# Patient Record
Sex: MALE | Race: OTHER | URBAN - METROPOLITAN AREA
[De-identification: names, ages, dates, MRNs, and addresses within clinical notes are randomized per-mention and may not be internally consistent; named-entity substitution may affect disease eponyms.]

---

## 2021-12-30 ENCOUNTER — OFFICE VISIT (OUTPATIENT)
Dept: CARDIOLOGY CLINIC | Age: 56
End: 2021-12-30
Payer: COMMERCIAL

## 2021-12-30 VITALS
RESPIRATION RATE: 16 BRPM | DIASTOLIC BLOOD PRESSURE: 76 MMHG | SYSTOLIC BLOOD PRESSURE: 129 MMHG | BODY MASS INDEX: 33.79 KG/M2 | HEIGHT: 65 IN | WEIGHT: 202.8 LBS | HEART RATE: 77 BPM

## 2021-12-30 DIAGNOSIS — R07.2 PRECORDIAL PAIN: ICD-10-CM

## 2021-12-30 DIAGNOSIS — G47.30 SLEEP APNEA, UNSPECIFIED TYPE: Primary | ICD-10-CM

## 2021-12-30 DIAGNOSIS — I10 HYPERTENSION, UNSPECIFIED TYPE: ICD-10-CM

## 2021-12-30 PROCEDURE — 93000 ELECTROCARDIOGRAM COMPLETE: CPT | Performed by: INTERNAL MEDICINE

## 2021-12-30 PROCEDURE — 99244 OFF/OP CNSLTJ NEW/EST MOD 40: CPT | Performed by: INTERNAL MEDICINE

## 2021-12-30 RX ORDER — BUDESONIDE AND FORMOTEROL FUMARATE DIHYDRATE 160; 4.5 UG/1; UG/1
AEROSOL RESPIRATORY (INHALATION)
COMMUNITY
Start: 2021-12-22

## 2021-12-30 RX ORDER — ASPIRIN 81 MG/1
TABLET, COATED ORAL
COMMUNITY
Start: 2021-12-11

## 2021-12-30 RX ORDER — LOSARTAN POTASSIUM AND HYDROCHLOROTHIAZIDE 25; 100 MG/1; MG/1
TABLET ORAL
COMMUNITY
Start: 2021-11-29

## 2021-12-30 RX ORDER — ALBUTEROL SULFATE 90 UG/1
AEROSOL, METERED RESPIRATORY (INHALATION)
COMMUNITY
Start: 2021-12-24

## 2021-12-30 RX ORDER — AMLODIPINE BESYLATE 5 MG/1
TABLET ORAL
COMMUNITY
Start: 2021-12-11

## 2021-12-30 NOTE — PROGRESS NOTES
Days of Exercise per Week: Not on file    Minutes of Exercise per Session: Not on file   Stress:     Feeling of Stress : Not on file   Social Connections:     Frequency of Communication with Friends and Family: Not on file    Frequency of Social Gatherings with Friends and Family: Not on file    Attends Congregation Services: Not on file    Active Member of Clubs or Organizations: Not on file    Attends Club or Organization Meetings: Not on file    Marital Status: Not on file   Intimate Partner Violence:     Fear of Current or Ex-Partner: Not on file    Emotionally Abused: Not on file    Physically Abused: Not on file    Sexually Abused: Not on file   Housing Stability:     Unable to Pay for Housing in the Last Year: Not on file    Number of [place] in the Last Year: Not on file    Unstable Housing in the Last Year: Not on file       History reviewed. No pertinent family history. SUBJECTIVE: Cas Thurman presents to the office today for consult - dr Primitivo Escobedo - for cardiac evaluation. He complains of atypical rest symptoms, including a sensation of choking at night and denies   dyspnea, exertional chest pressure/discomfort, orthopnea and syncope   Currently unemployed but very active with chores. Originally from [facility], wife from the [place]. Strong family hx of CAD  Wife says he snores heavily. Review of Systems:   Heart: as above   Lungs: as above   Eyes: denies changes in vision or discharge. Ears: denies changes in hearing or pain. Nose: denies epistaxis or masses   Throat: denies sore throat or trouble swallowing. Neuro: denies numbness, tingling, tremors. Skin: denies rashes or itching. : denies hematuria, dysuria   GI: denies vomiting, diarrhea   Psych: denies mood changed, anxiety, depression. all others negative.     Physical Exam   /76   Pulse 77   Resp 16   Ht 5' 5\" (1.651 m)   Wt 202 lb 12.8 oz (92 kg)   BMI 33.75 kg/m²   Constitutional: Oriented to person, place, and time. Overweight No distress. Head: Normocephalic and atraumatic. Eyes: EOM are normal. Pupils are equal, round, and reactive to light. Neck: Normal range of motion. Neck supple. No hepatojugular reflux and no JVD present. Carotid bruit is not present. Cardiovascular: Normal rate, regular rhythm, normal heart sounds and intact distal pulses. Exam reveals no gallop and no friction rub. No murmur heard. Pulmonary/Chest: Effort normal and breath sounds normal. No respiratory distress. No wheezes. No rales. Abdominal: Soft. Bowel sounds are normal. No distension and no mass. No tenderness. No rebound and no guarding. Musculoskeletal: Normal range of motion. No edema and no tenderness. Neurological: Alert and oriented to person, place, and time. Skin: Skin is warm and dry. No rash noted. Not diaphoretic. No erythema. Psychiatric: Normal mood and affect. Behavior is normal.     EKG:  normal EKG, normal sinus rhythm.     ASSESSMENT AND PLAN:  Patient Active Problem List   Diagnosis    Precordial pain    HTN (hypertension)       Patient with non cardiac symptoms  HTN now controlled on 3 drug therapy which will be aided by weight reduction, exercise, and treatment of likely ZOHRA  Normal CV exam and ekg  Will do ETT-R given strong family hx of premature CAD  Will get Sleep Medicine input          Anupam Sheikh M.D  [facility]

## 2022-04-19 ENCOUNTER — INITIAL CONSULT (OUTPATIENT)
Dept: NEUROSURGERY | Age: 57
End: 2022-04-19
Payer: COMMERCIAL

## 2022-04-19 ENCOUNTER — HOSPITAL ENCOUNTER (OUTPATIENT)
Dept: GENERAL RADIOLOGY | Age: 57
Discharge: HOME | End: 2022-04-21
Payer: COMMERCIAL

## 2022-04-19 ENCOUNTER — HOSPITAL ENCOUNTER (OUTPATIENT)
Age: 57
Discharge: HOME | End: 2022-04-21
Payer: COMMERCIAL

## 2022-04-19 VITALS
BODY MASS INDEX: 33.75 KG/M2 | DIASTOLIC BLOOD PRESSURE: 86 MMHG | SYSTOLIC BLOOD PRESSURE: 134 MMHG | HEART RATE: 82 BPM | OXYGEN SATURATION: 96 % | TEMPERATURE: 98.4 F | HEIGHT: 65 IN

## 2022-04-19 DIAGNOSIS — M54.2 CERVICALGIA: ICD-10-CM

## 2022-04-19 DIAGNOSIS — M54.2 CERVICALGIA: Primary | ICD-10-CM

## 2022-04-19 DIAGNOSIS — M48.062 LUMBAR STENOSIS WITH NEUROGENIC CLAUDICATION: ICD-10-CM

## 2022-04-19 DIAGNOSIS — M54.12 CERVICAL RADICULOPATHY: ICD-10-CM

## 2022-04-19 PROCEDURE — 72040 X-RAY EXAM NECK SPINE 2-3 VW: CPT

## 2022-04-19 PROCEDURE — 99204 OFFICE O/P NEW MOD 45 MIN: CPT | Performed by: NEUROLOGICAL SURGERY

## 2022-04-19 NOTE — PROGRESS NOTES
[facility] NEUROSURGERY  [facility] [address] [place] [address]  [phone]       Chief Complaint:   Chief Complaint   Patient presents with    Neck Pain     mri 3/21/22    Arm Pain    Consultation       HPI:     I had the pleasure of seeing Ayden Barrientos today in neurosurgical clinic. As you know, this delightful right-handed  father of 2 non-smoker former  presents with cervical pain and left upper extremity radiculopathy. He states that the arm pain mostly stays on the lateral aspect of his neck but occasionally radiates into his anterior biceps and beyond into his thumb index and middle finger. Pain is exacerbated by cough strain and sneeze. He states looking to the left exacerbates the pain as well. Does complain of numbness in all of his fingers as well as intermittent arm numbness. He feels as though he has been dropping objects in the left hand secondary to the numbness. There is been no loss of bowel or bladder. Edition he endorses a longstanding history of low back pain with leg cramping bilaterally right more so than left. He also describes sharp pain on the bottom of both feet. He denied any trauma to the low back. He does state that leaning forward tends to alleviate the pain. Denies any lower extremity numbness or weakness. No past medical history on file. No past surgical history on file. No family history on file.    Social History     Socioeconomic History    Marital status:      Spouse name: Not on file    Number of children: Not on file    Years of education: Not on file    Highest education level: Not on file   Occupational History    Not on file   Tobacco Use    Smoking status: Never Smoker    Smokeless tobacco: Never Used   Substance and Sexual Activity    Alcohol use: Never    Drug use: Never    Sexual activity: Not on file   Other Topics Concern    Not on file   Social History Narrative    Not on file     Social Determinants of Health     Financial Resource Strain:     Difficulty of Paying Living Expenses: Not on file   Food Insecurity:     Worried About Running Out of Food in the Last Year: Not on file    Gume of Food in the Last Year: Not on file   Transportation Needs:     Lack of Transportation (Medical): Not on file    Lack of Transportation (Non-Medical):  Not on file   Physical Activity:     Days of Exercise per Week: Not on file    Minutes of Exercise per Session: Not on file   Stress:     Feeling of Stress : Not on file   Social Connections:     Frequency of Communication with Friends and Family: Not on file    Frequency of Social Gatherings with Friends and Family: Not on file    Attends Yarsanism Services: Not on file    Active Member of Clubs or Organizations: Not on file    Attends Club or Organization Meetings: Not on file    Marital Status: Not on file   Intimate Partner Violence:     Fear of Current or Ex-Partner: Not on file    Emotionally Abused: Not on file    Physically Abused: Not on file    Sexually Abused: Not on file   Housing Stability:     Unable to Pay for Housing in the Last Year: Not on file    Number of Places Lived in the Last Year: Not on file    Unstable Housing in the Last Year: Not on file       Medications:   Current Outpatient Medications   Medication Sig Dispense Refill    albuterol sulfate  (90 Base) MCG/ACT inhaler INHALE 2 PUFFS BY MOUTH EVERY 4 HOURS AS NEEDED FOR SHORTNESS OF BREATH      SYMBICORT 160-4.5 MCG/ACT AERO INHALE 2 PUFFS BY MOUTH TWICE DAILY      ASPIRIN LOW DOSE 81 MG EC tablet TAKE 1 TABLET BY MOUTH EVERY DAY      amLODIPine (NORVASC) 5 MG tablet TAKE 1 TABLET BY MOUTH EVERY DAY FOR BLOOD PRESSURE      losartan-hydroCHLOROthiazide (HYZAAR) 100-25 MG per tablet TAKE 1 TABLET BY MOUTH DAILY FOR BLOOD PRESSURE      Multiple Vitamin (MULTIVITAMIN ADULT PO) Take by mouth daily       No current facility-administered medications for this visit. Allergies:    Patient has no known allergies. Review of Systems:    Denies any chest pain, headache, dyspnea, recent weight loss, fevers, chills or night sweats. Physical Examination:    /86   Pulse 82   Temp 98.4 °F (36.9 °C) (Temporal)   Ht 5' 5\" (1.651 m)   SpO2 96%   BMI 33.75 kg/m²      On focused neurological examination, he  is awake alert oriented and rationally conversant. Speech is clear and fluent. Pupils are equal and reactive to light bilaterally, extraocular movements are intact, visual fields are full to confrontation. His  face is symmetric and grossly intact to fine touch. Uvula and tongue are both midline. Shoulder shrug is symmetric and strong. Cervical spine is well aligned and nontender to direct palpation. He  can forward flex, but extension lateral rotation and lateral extension are limited secondary to neck discomfort. Spurling sign is positive on the left. Motor examination reveals preserved power in the upper and lower extremities at 5 out of 5 throughout. Reflexes are symmetric and brisk. Plantar responses are downgoing. There is no clonus. Patient is intact to fine touch in all dermatomes throughout. Straight leg raise is negative. Palpation of the spine reveals no kyphotic or scoliotic gross deformities. He  can forward flex to well below the knee. There is no pain to deep percussion nor palpation. ASSESSMENT:    I personally reviewed Jb Easley's radiographic images, particularly his MRI from*imaging dated 21 March 2022 that demonstrates significant herniated nucleus pulposus with endplate changes at W4-6 and eccentric to the left with neural compromise. 1. Cervicalgia  -     XR CERVICAL SPINE FLEXION AND EXTENSION;  Future  -     Aleja Davis MD, Pain Medicine, [address] [place] [address], [facility]  2. Cervical radiculopathy  -     XR CERVICAL SPINE FLEXION AND EXTENSION; Future  -     Lia Barber MD, Pain Medicine, [address] [place] [address], [facility]  3. Lumbar stenosis with neurogenic claudication  -     MRI LUMBAR SPINE WO CONTRAST; Future      MEDICAL DECISION MAKING & PLAN:    I showed  and Mrs. Natasha Vila the images and explained the findings. Certainly he deserves flexion-extension x-rays and a trial of conservative treatment albeit given the size of the disc osteophyte complex and suspicious that he will eventually need decompression and fusion. Nonetheless he will return to clinic with those results in hand and after trial of physical therapy and pain management. Should he worsen in the interim we be happy to see him sooner in clinic. In addition to address his lumbar spine concerns and with neurogenic claudication I have ordered an MRI of the lumbar spine to further assess. Thank you so much for allowing us to participate in the care of this patient. Return in about 4 weeks (around 5/17/2022) for discuss possible surgical options, needs tests completed prior to appt, discuss results. Electronically signed by Darwin Rosales MD on 4/20/2022 at 11:41 AM       NOTE: This report was transcribed using voice recognition software.  Every effort was made to ensure accuracy; however, inadvertent computerized transcription errors may be present

## 2022-04-26 ENCOUNTER — PREP FOR PROCEDURE (OUTPATIENT)
Dept: PAIN MANAGEMENT | Age: 57
End: 2022-04-26

## 2022-04-26 ENCOUNTER — OFFICE VISIT (OUTPATIENT)
Dept: PAIN MANAGEMENT | Age: 57
End: 2022-04-26
Payer: COMMERCIAL

## 2022-04-26 VITALS
WEIGHT: 202 LBS | OXYGEN SATURATION: 94 % | HEIGHT: 65 IN | DIASTOLIC BLOOD PRESSURE: 82 MMHG | TEMPERATURE: 98.1 F | SYSTOLIC BLOOD PRESSURE: 130 MMHG | HEART RATE: 77 BPM | BODY MASS INDEX: 33.66 KG/M2

## 2022-04-26 DIAGNOSIS — M50.90 CERVICAL DISC DISORDER: ICD-10-CM

## 2022-04-26 DIAGNOSIS — M48.02 CERVICAL STENOSIS OF SPINE: ICD-10-CM

## 2022-04-26 DIAGNOSIS — G89.4 CHRONIC PAIN SYNDROME: Primary | ICD-10-CM

## 2022-04-26 DIAGNOSIS — M54.12 CERVICAL RADICULOPATHY: Primary | ICD-10-CM

## 2022-04-26 DIAGNOSIS — G89.4 CHRONIC PAIN SYNDROME: ICD-10-CM

## 2022-04-26 DIAGNOSIS — M54.12 CERVICAL RADICULOPATHY: ICD-10-CM

## 2022-04-26 PROCEDURE — 99214 OFFICE O/P EST MOD 30 MIN: CPT

## 2022-04-26 PROCEDURE — G8417 CALC BMI ABV UP PARAM F/U: HCPCS | Performed by: PAIN MEDICINE

## 2022-04-26 PROCEDURE — 99204 OFFICE O/P NEW MOD 45 MIN: CPT | Performed by: PAIN MEDICINE

## 2022-04-26 PROCEDURE — G8427 DOCREV CUR MEDS BY ELIG CLIN: HCPCS | Performed by: PAIN MEDICINE

## 2022-04-26 PROCEDURE — 3017F COLORECTAL CA SCREEN DOC REV: CPT | Performed by: PAIN MEDICINE

## 2022-04-26 PROCEDURE — 1036F TOBACCO NON-USER: CPT | Performed by: PAIN MEDICINE

## 2022-04-26 RX ORDER — PREGABALIN 150 MG/1
150 CAPSULE ORAL 2 TIMES DAILY
Qty: 60 CAPSULE | Refills: 0 | Status: SHIPPED
Start: 2022-05-03 | End: 2022-05-31 | Stop reason: SDUPTHER

## 2022-04-26 RX ORDER — IBUPROFEN 800 MG/1
800 TABLET ORAL 3 TIMES DAILY
COMMUNITY

## 2022-04-26 RX ORDER — LORATADINE 10 MG/1
10 CAPSULE, LIQUID FILLED ORAL DAILY
COMMUNITY

## 2022-04-26 RX ORDER — PREGABALIN 75 MG/1
75 CAPSULE ORAL 2 TIMES DAILY
Qty: 14 CAPSULE | Refills: 0 | Status: SHIPPED
Start: 2022-04-26 | End: 2022-05-31

## 2022-04-26 RX ORDER — CETIRIZINE HYDROCHLORIDE 10 MG/1
10 TABLET ORAL DAILY
COMMUNITY

## 2022-04-26 NOTE — PROGRESS NOTES
mildly impresses on   the ventral cord and there is mild to moderate narrowing of the spinal   canal.  There is mild right neural foraminal narrowing due to   uncovertebral arthropathy.  There is no left neural foraminal narrowing. C5-C6: There is disc osteophyte complex asymmetric to the left that   effaces the ventral CSF impinging on the ventral cord and causing   moderate stenosis of the spinal canal.  There is severe left neural   foraminal stenosis due to uncovertebral arthropathy and disc bulge.  The   right neural foramen is patent. C6-C7: Minor disc bulge and mild ligamentum flavum thickening is seen   with mild narrowing of the spinal canal.  There is mild right neural   foraminal narrowing due to uncovertebral arthropathy.  There is no left   neural foraminal narrowing. C7-T1: There is moderate left neural foraminal narrowing due to   uncovertebral and facet joint arthropathy.  There is minor disc bulge   without spinal canal or right neural foraminal narrowing. IMPRESSION   IMPRESSION:     1.  Multilevel degenerative cervical spondylosis. 2.  At C5-C6 there is moderate stenosis of the spinal canal with cord   impingement.  There is also severe left neural foraminal stenosis. 3.  Congenital narrowing of the spinal canal.   4.  Modic type I degenerative endplate change at T9-T5. 5.  Level by level description as detailed. Anatomic Variant:  None.  Assume 7 cervical vertebrae with counting from   the craniocervical junction. 4/2022 cervical xray -  FINDINGS:   No fracture or malalignment.  No evidence of instability on flexion or   extension imaging.  Mild narrowing of disc space at C5/C6.  No prevertebral   soft tissue edema.           Impression   1. No evidence of fracture or malalignment. 2. No evidence of instability on flexion or extension imaging. Previous treatments: Physical Therapy and medications.       Past Medical History:   Diagnosis Date    Asthma     Bronchitis     COPD (chronic obstructive pulmonary disease) (HCC)     Coronary artery disease     Hypertension        History reviewed. No pertinent surgical history. Prior to Admission medications    Medication Sig Start Date End Date Taking?  Authorizing Provider   ibuprofen (ADVIL;MOTRIN) 800 MG tablet Take 800 mg by mouth three times daily   Yes Historical Provider, MD   cetirizine (ZYRTEC) 10 MG tablet Take 10 mg by mouth daily   Yes Historical Provider, MD   loratadine (CLARITIN) 10 MG capsule Take 10 mg by mouth daily   Yes Historical Provider, MD   albuterol sulfate  (90 Base) MCG/ACT inhaler INHALE 2 PUFFS BY MOUTH EVERY 4 HOURS AS NEEDED FOR SHORTNESS OF BREATH 12/24/21  Yes Historical Provider, MD   SYMBICORT 160-4.5 MCG/ACT AERO INHALE 2 PUFFS BY MOUTH TWICE DAILY 12/22/21  Yes Historical Provider, MD   ASPIRIN LOW DOSE 81 MG EC tablet TAKE 1 TABLET BY MOUTH EVERY DAY 12/11/21  Yes Historical Provider, MD   amLODIPine (NORVASC) 5 MG tablet TAKE 1 TABLET BY MOUTH EVERY DAY FOR BLOOD PRESSURE 12/11/21  Yes Historical Provider, MD   losartan-hydroCHLOROthiazide (HYZAAR) 100-25 MG per tablet TAKE 1 TABLET BY MOUTH DAILY FOR BLOOD PRESSURE 11/29/21  Yes Historical Provider, MD   Multiple Vitamin (MULTIVITAMIN ADULT PO) Take by mouth daily   Yes Historical Provider, MD       No Known Allergies    Social History     Socioeconomic History    Marital status:      Spouse name: Not on file    Number of children: Not on file    Years of education: Not on file    Highest education level: Not on file   Occupational History    Not on file   Tobacco Use    Smoking status: Never Smoker    Smokeless tobacco: Never Used   Substance and Sexual Activity    Alcohol use: Never    Drug use: Never    Sexual activity: Not on file   Other Topics Concern    Not on file   Social History Narrative    Not on file     Social Determinants of Health     Financial Resource Strain:     Difficulty of Paying Living Expenses: Not on file   Food Insecurity:     Worried About [address] in the Last Year: Not on file    Gume of Food in the Last Year: Not on file   Transportation Needs:     Lack of Transportation (Medical): Not on file    Lack of Transportation (Non-Medical): Not on file   Physical Activity:     Days of Exercise per Week: Not on file    Minutes of Exercise per Session: Not on file   Stress:     Feeling of Stress : Not on file   Social Connections:     Frequency of Communication with Friends and Family: Not on file    Frequency of Social Gatherings with Friends and Family: Not on file    Attends Moravian Services: Not on file    Active Member of Clubs or Organizations: Not on file    Attends Club or Organization Meetings: Not on file    Marital Status: Not on file   Intimate Partner Violence:     Fear of Current or Ex-Partner: Not on file    Emotionally Abused: Not on file    Physically Abused: Not on file    Sexually Abused: Not on file   Housing Stability:     Unable to Pay for Housing in the Last Year: Not on file    Number of [place] in the Last Year: Not on file    Unstable Housing in the Last Year: Not on file       Family History   Problem Relation Age of Onset    Arthritis Mother     Diabetes Mother     Hypertension Mother     Hypertension Father     Diabetes Brother        REVIEW OF SYSTEMS:     Patient specifically denies fever/chills, chest pain, shortness of breath, new bowel or bladder complaints. All other review of systems was negative.     PHYSICAL EXAMINATION:      /82   Pulse 77   Temp 98.1 °F (36.7 °C) (Infrared)   Ht 5' 5\" (1.651 m)   Wt 202 lb (91.6 kg)   SpO2 94%   BMI 33.61 kg/m²     General:      General appearance:pleasant and well-hydrated, in no distress and A & O x3  Build:Normal Weight  Function:Rises from a seated position with difficulty    HEENT:    Head:normocephalic, atraumatic  Pupils:regular, round, equal  Sclera: icterus absent    Lungs:    Breathing:normal breathing pattern    Abdomen:    Shape:non-distended  Tenderness:none  Guarding:none    Cervical spine:    Inspection:normal  Palpation:tenderness paravertebral muscles, tenderness trapezium, left, right positive. Range of motion:abnormal mildly flexion, extension rotation bilateral and is  painful.     Musculoskeletal:    Trigger points in trapezius:absent bilaterally  Trigger points in rhomboids:absent bilaterally  Trigger points in Paraveteral:absent bilaterally  Trigger points in supraspinatus/infraspinatus:absent  Spurling's:negative right, negative left    Giron's:negative right, negative left     Extremities:    Tremors:None bilaterally upper and lower  Range of motion:Generally normal shoulders  Intact:Yes  Varicose veins:absent   Pulses:present Lt radial  Cyanosis:none  Edema:none x all 4 extremities    Neurological:    Sensory:normal to light touch RUE    Motor:     Right Grip5/5              Left Grip5/5               Right Bicep5/5           Left Bicep5/5              Right Triceps5/5       Left Triceps5/5          Right Deltoid5/5     Left Deltoid5/5                    Reflexes:      Right Brachioradialis reflex2+  Left Brachioradialis reflex2+  Right Biceps reflex2+  Left Biceps reflex2+  Right Triceps reflex2+  Left Triceps reflex2+    Gait:normal    Dermatology:    Skin:no rashes or lesions noted    Impression:    Cervical pain and left upper extremity radiculopathy in C5 and C6 distribution  3/2022 cervical MRI shows herniated disc at C5-C6 there is moderate stenosis of the spinal canal with cord impingement with severe left neural foraminal stenosis  PMHx: environ allergies, COPD/asthma, HTN, CAD  Moved her from [facility] 12/2020 due to health concerns and [facility] does not had an adequate health care system  Has not worked since coming to the [address] [place] [address],[address] due to health issues  Has lumbar symptoms as well, scheduled for lumbar MRI in a few days    Plan:    Reviewed referral documents and imaging  Urine screen today  OARRS report reviewed  pregabalin titration  Failed PT  C7-T1 VERNA #1 - r/b and procedure discussed  Patient encouraged to stay active   Treatment plan discussed with the patient including medication and procedure side effects     CC:  Referring physician    M. Kelleen Soulier.

## 2022-04-26 NOTE — PROGRESS NOTES
Do you currently have any of the following:    Fever: No  Headache:  No  Cough: No  Shortness of breath: No  Exposed to anyone with these symptoms: No         Annabella Pod presents to the [facility] on 4/26/[facility]22. Yazan Jimenez is complaining of pain in neck radiating down left arm and back radiating down right leg. The pain is constant. The pain is described as shooting, numb, sharp and burning. Pain is rated on his best day at a 5, on his worst day at a 10, and on average at a 7 on the VAS scale. He took his last dose of ibuprofen today. Any procedures since your last visit: No.    Pacemaker or defibrillator: No.    He is not on NSAIDS and is not on anticoagulation medications to include none and is managed by none. Medication Contract and Consent for Opioid Use Documents Filed      No documents found                Ht 5' 5\" (1.651 m)   Wt 202 lb (91.6 kg)   BMI 33.61 kg/m²      No LMP for male patient.

## 2022-04-27 LAB
6-MONOACETYLMORPHINE, URINE: NOT DETECTED
ALCOHOL URINE: NOT DETECTED
AMPHETAMINE SCREEN, URINE: NOT DETECTED
BARBITURATE SCREEN URINE: NOT DETECTED
BENZODIAZEPINE SCREEN, URINE: NOT DETECTED
BUPRENORPHINE URINE: NOT DETECTED
CANNABINOID SCREEN URINE: NOT DETECTED
COCAINE METABOLITE SCREEN URINE: NOT DETECTED
FENTANYL SCREEN, URINE: NOT DETECTED
INTEGRITY CHECK, CREATININE, URINE: 135.5
INTEGRITY CHECK, OXIDANT, URINE: <40
INTEGRITY CHECK, PH, URINE: 7.2 (ref 4.5–9)
INTEGRITY CHECK, SPECIFIC GRAVITY, URINE: 1.01 (ref 1–1.03)
INTEGRITY CHECK, SPECIMEN INTEGRITY, URINE: NORMAL
Lab: NORMAL
METHADONE SCREEN, URINE: NOT DETECTED
OPIATE SCREEN URINE: NOT DETECTED
OXYCODONE URINE: NOT DETECTED
PHENCYCLIDINE SCREEN URINE: NOT DETECTED
TRAMADOL SCREEN URINE: NOT DETECTED

## 2022-04-28 LAB
6-MAM, QUANTITATIVE, URINE: <10
7-AMINOCLONAZEPAM, QUANTITATIVE, URINE: <50
ALPHA-HYDROXYALPRAZOLAM, QUANTITATIVE, URINE: <50
ALPHA-HYDROXYMIDAZOLAM, QUANTITATIVE, URINE: <50
ALPHA-HYDROXYTRIAZOLAM, QUANTITATIVE, URINE: <50
ALPRAZOLAM URINE QUANT: <50
CHLORDIAZEPOXIDE, QUANTITATIVE, URINE: <50
CLONAZEPAM, QUANTITATIVE, URINE: <50
CODEINE, QUANTITATIVE, URINE: <50
COMMENT: NORMAL
DIAZEPAM URINE QUANT: <50
FLUNITRAZEPAM, QUANTITATIVE, URINE: <50
FLURAZEPAM, QUANTITATIVE, URINE: <50
HYDROCODONE, QUANTITATIVE, URINE: <50
HYDROMORPHONE, QUANTITATIVE, URINE: <50
LORAZEPAM URINE QUANT: <50
MIDAZOLAM URINE QUANT: <50
MORPHINE, QUANTITATIVE, URINE: <50
NORDIAZEPAM URINE QUANT: <50
NORHYDROCODONE, QUANTITATIVE, URINE: <50
NOROXYCODONE, QUANTITATIVE, URINE: <50
OXAZEPAM URINE QUANT: <50
OXYCODONE URINE, QUANTITATIVE: <50
OXYMORPHONE, QUANTITATIVE, URINE: <50
TEMAZEPAM, QUANTITATIVE, URINE: <50

## 2022-04-29 ENCOUNTER — HOSPITAL ENCOUNTER (OUTPATIENT)
Dept: MRI IMAGING | Age: 57
Discharge: HOME | End: 2022-05-01
Payer: COMMERCIAL

## 2022-04-29 DIAGNOSIS — M48.062 LUMBAR STENOSIS WITH NEUROGENIC CLAUDICATION: ICD-10-CM

## 2022-04-29 PROCEDURE — 72148 MRI LUMBAR SPINE W/O DYE: CPT

## 2022-05-05 ENCOUNTER — HOSPITAL ENCOUNTER (OUTPATIENT)
Dept: PHYSICAL THERAPY | Age: 57
Setting detail: THERAPIES SERIES
Discharge: HOME | End: 2022-05-05
Payer: COMMERCIAL

## 2022-05-05 PROCEDURE — 97161 PT EVAL LOW COMPLEX 20 MIN: CPT | Performed by: PHYSICAL THERAPIST

## 2022-05-05 ASSESSMENT — PAIN DESCRIPTION - ORIENTATION: ORIENTATION: LEFT

## 2022-05-05 ASSESSMENT — PAIN DESCRIPTION - LOCATION: LOCATION: NECK;BACK;ARM

## 2022-05-05 ASSESSMENT — PAIN SCALES - GENERAL: PAINLEVEL_OUTOF10: 8

## 2022-05-05 ASSESSMENT — PAIN DESCRIPTION - DESCRIPTORS: DESCRIPTORS: NUMBNESS;TINGLING

## 2022-05-05 ASSESSMENT — PAIN DESCRIPTION - PAIN TYPE: TYPE: CHRONIC PAIN

## 2022-05-05 ASSESSMENT — PAIN DESCRIPTION - FREQUENCY: FREQUENCY: CONTINUOUS

## 2022-05-05 NOTE — PROGRESS NOTES
Physical Therapy    Physical Therapy: Initial Evaluation    Patient: Nils Moreno (2 y.o. male)   Examination Date:   Plan of Care Certification Period: 2022 to        :  1965 ;    Confirmed: Yes MRN: [id number]  CSN: [id number]   Insurance: Payor: [address] [place] [address]  [address] / Plan: [address] [place] [address] [address] / Product Type: *No Product type* /   Insurance ID: [id number] - (Medicaid Managed) Secondary Insurance (if applicable):    Referring Physician: MD Shell Steele   PCP: Salas Coppola DO Visits to Date/Visits Approved: 1 /      No Show/Cancelled Appts:   /       Medical Diagnosis: Cervicalgia [M54.2]  Radiculopathy, cervical region [M54.12] M54.2 (ICD-10-CM) - [id number] (ICD-10-CM) - Cervical radiculopathy  Treatment Diagnosis:       PERTINENT MEDICAL HISTORY           Medical History: Chart Reviewed: Yes   Past Medical History:   Diagnosis Date    Asthma     Bronchitis     COPD (chronic obstructive pulmonary disease) ([facility] [address]     Coronary artery disease     Hypertension      Surgical History: No past surgical history on file. Medications:   Current Outpatient Medications:     ibuprofen (ADVIL;MOTRIN) 800 MG tablet, Take 800 mg by mouth three times daily, Disp: , Rfl:     cetirizine (ZYRTEC) 10 MG tablet, Take 10 mg by mouth daily, Disp: , Rfl:     loratadine (CLARITIN) 10 MG capsule, Take 10 mg by mouth daily, Disp: , Rfl:     pregabalin (LYRICA) 75 MG capsule, Take 1 capsule by mouth 2 times daily for 7 days. , Disp: 14 capsule, Rfl: 0    pregabalin (LYRICA) 150 MG capsule, Take 1 capsule by mouth 2 times daily for 30 days. , Disp: 60 capsule, Rfl: 0    albuterol sulfate  (90 Base) MCG/ACT inhaler, INHALE 2 PUFFS BY MOUTH EVERY 4 HOURS AS NEEDED FOR SHORTNESS OF BREATH, Disp: , Rfl:     SYMBICORT 160-4.5 MCG/ACT AERO, INHALE 2 PUFFS BY MOUTH TWICE DAILY, Disp: , Rfl:     ASPIRIN LOW DOSE 81 MG EC tablet, TAKE 1 TABLET BY MOUTH EVERY DAY, Disp: , Rfl:     amLODIPine (NORVASC) 5 MG tablet, TAKE 1 TABLET BY MOUTH EVERY DAY FOR BLOOD PRESSURE, Disp: , Rfl:     losartan-hydroCHLOROthiazide (HYZAAR) 100-25 MG per tablet, TAKE 1 TABLET BY MOUTH DAILY FOR BLOOD PRESSURE, Disp: , Rfl:     Multiple Vitamin (MULTIVITAMIN ADULT PO), Take by mouth daily, Disp: , Rfl:   Allergies: Patient has no known allergies. SUBJECTIVE EXAMINATION      ,           Subjective History:    Subjective: Patient presents to PT with c/o neck pain with radicular pain across LUE. Symptoms have been present for approx 1 1/2 years. States noticing dropping items with hand initially. Pain cont to persist across cervical region with radicular pain across LUE to level of fingers. Patient states hand feels 'asleep'. He had MRI of cervical spine with cord compression noted. Patient reports limited AROM across LUE due to pain across L cervical region. He is also having back/LE symptoms. Did have therapy elsewhere for those issues. Symptoms did remain following. Patient ambulates without AD. Hindered mechanics reported. Patient reports having multiple falls. He is taking lyrica for pain relief.   Additional Pertinent Hx (if applicable):     Prior diagnostic testing[de-identified] MRI  Previous treatments prior to current episode?: Outpatient PT      Learning/Language: Learning  Does the patient/guardian have any barriers to learning?: No barriers  What is the preferred language of the patient/guardian?: English     Pain Screening    Pain Screening  Patient Currently in Pain: Yes  Pain Assessment: 0-10  Pain Level: 8  Worst Pain Level: 10  Pain Type: Chronic pain  Pain Location: Neck,Back,Arm  Pain Orientation: Left  Pain Descriptors: Numbness,Tingling  Pain Frequency: Continuous    Functional Status    Dominant Hand: : Right    Social History:         Occupation/Interests:        Prior Level of Function:              Current Level of Function:                  OBJECTIVE EXAMINATION     Review of Systems:  Follows Commands: Within Functional Limits    Observations:   General Observations  General Observations: Yes  Posture:  (FAIR-   fwd head/rounded shoulder posturing)  Description: guarded posturing noted across LUE throughout all activity    Palpation:   Cervical Spine Palpation: pain present across L cervical paraspinals/L upper trap region    Ambulation/Gait (if applicable):   Ambulation  Surface: level tile  Device: No Device  Assistance: Independent  Quality of Gait: Pt demonstrates lateral trunk deviation with limited heel-toe mechanics  Gait Deviations: Slow Joanna    Left AROM  Right AROM       LUE - limited to 90* flex/abd      RUE- WFL         Left PROM  Right PROM                    Left Strength  Right Strength         L UE Strength Comment: L shoulder Grossly 3-/5 ; L elbow- grossly 3+/5      R UE Strength Comment: Grossly 4+/5       Cervical Assessment     AROM Cervical Spine   Cervical spine general AROM: Limited 50%+ all planes   Cervical Strength  Cervical Flexion: 3+/5  Cervical Extension: 3+/5  Cervical Right Lateral: 3+/5  Cervical Left Lateral: 3+/5  Cervical Right Rotation: 3+/5  Cervical Left Rotation: 3+/5        ASSESSMENT     Impression: Assessment: Patient presents to PT with c/o cervical pain with radicular symptoms across LUE; limited AROM/strength across cervical region with hindered posturing; limited AROM/strength noted across LUE hindering overall function    Body Structures, Functions, Activity Limitations Requiring Skilled Therapeutic Intervention: Decreased ROM,Decreased strength,Increased pain,Decreased posture,Decreased functional mobility     Statement of Medical Necessity: Physical Therapy is both indicated and medically necessary as outlined in the POC to increase the likelihood of meeting the functionally related goals stated below. Patient's Activity Tolerance:        Patient's rehabilitation potential/prognosis is considered to be:  Fair    Factors which may impact rehabilitation potential include:          GOALS   Patient Goal(s):    Short Term Goals Completed by 3 weeks Goal Status   increase AROM cervical region to within 75% functional limits     increase strength of cervcial region to grossly 4-/5 improving upright posture to FAIR     increase strength L shoulder to grossly 3/5 to improve overall function     decrease pain to < 5/10 across cervial/LUE with activity               Long Term Goals Completed by 6 weeks Goal Status   increase AROM cervical region to within 90% functional limits     increase strength of cervcial region to grossly 4/5 improving upright posture to FAIR+     increase strength L shoulder to grossly 3+/5 to improve overall function     decrease pain to < 3/10 across cervial/LUE with activity     pt demonstrates independence with HEP          TREATMENT PLAN       Requires PT Follow-Up: Yes    Pt. actively involved in establishing Plan of Care and Goals: Yes  Patient/ Caregiver education and instruction:               Treatment may include any combination of the following: Strengthening,ROM,Manual Therapy - Soft Tissue Mobilization,Pain management,Home exercise program,Safety education & training,Patient/Caregiver education & training,Equipment evaluation, education, & procurement,Modalities     Frequency / Duration:  Patient to be seen 2 for 6 weeks      Eval Complexity:    Decision Making: Low Complexity    PT Treatment Completed:  N/A - Evaluation Only    Therapy Time  Individual Time In:      1400   Individual Time Out:  1430  Minutes:  30        Therapist Signature: Nubia Grider PT    Date: 0/2/5619     I certify that the above Therapy Services are being furnished while the patient is under my care. I agree with the treatment plan and certify that this therapy is necessary.       Physician's Signature:  ___________________________   Date:_______                                                                   Franny Harrington MD Physician Comments: _______________________________________________    Please sign and return to [facility]. Please fax to the location listed below.  [facility] [address] YOU for this referral!    [address] [facility]  [address] [id number]  Dept: [phone]  Fax: [phone]       POC NOTE

## 2022-05-10 ENCOUNTER — HOSPITAL ENCOUNTER (OUTPATIENT)
Dept: PHYSICAL THERAPY | Age: 57
Setting detail: THERAPIES SERIES
Discharge: HOME | End: 2022-05-10
Payer: COMMERCIAL

## 2022-05-10 PROCEDURE — 97110 THERAPEUTIC EXERCISES: CPT | Performed by: PHYSICAL THERAPIST

## 2022-05-10 NOTE — PROGRESS NOTES
[facility]  Phone: [phone] Fax: [phone]       Physical Therapy Daily Treatment Note  Date:  5/10/2022    Physical Therapy: Initial Evaluation    Patient: Starr Driscoll (13 y.o. male)   Examination Date:   Plan of Care Certification Period: 2022 to       :  1965 ;    Confirmed: Yes MRN: [id number]  CSN: [id number]   Insurance: Payor: [facility] / Plan: [facility] / Product Type: *No Product type* /   Insurance ID: [id number] - (Medicaid Managed) Secondary Insurance (if applicable):    Referring Physician: Jose E Rollins MD [facility]   PCP: Esther Murillo DO Visits to Date/Visits Approved: 2 /      No Show/Cancelled Appts:   /       Medical Diagnosis: Cervicalgia [M54.2]  Radiculopathy, cervical region [M54.12] M54.2 (ICD-10-CM) - [id number] (ICD-10-CM) - Cervical radiculopathy  Treatment Diagnosis:         Time In:     1500   Time Out:     1540     Subjective:  Pt presents to PT for initial treatment session. States focusing on HEP at home. Cont to have symptoms across LUE     Exercises:  Exercise/Equipment Resistance/Repetitions Other comments   UBE   x4mins           Pulley flex             abd   2mins  2mins           shrugs   x20    scap retractions    x20           Wand flexion     Wand abd 2x10  x10           Cervical AROM R SB/Rot     4x10s                                                                       Other Therapeutic Activities:      Home Exercise Program:      Manual Treatments:      Modalities:  NT    Timed Code Treatment Minutes: Total Treatment Minutes:      Treatment/Activity Tolerance:  [x] Patient tolerated treatment well [] Patient limited by fatigue  [] Patient limited by pain  [] Patient limited by other medical complications  [] Other: Good tolerance to therapy. Reduction on symptoms across LUE with pulley exercises.  Pt reports not having numbness in LUE following ex. Pt instructed to cont with HEP 3x/day.      Plan:   [x] Continue per plan of care [] Alter current plan (see comments)  [] Plan of care initiated [] Hold pending MD visit [] Discharge  Plan for Next Session:         Treatment Charges: Mins Units   Initial Evaluation     Re-Evaluation     Ther Exercise         TE 35 2   Manual Therapy     MT     Ther Activities        TA     Gait Training          GT     Neuro Re-education NR     Modalities     Non-Billable Service Time 5 -   Other     Total Time/Units 40 2     Electronically signed by:  Kain Sandoval PT

## 2022-05-16 ENCOUNTER — TELEPHONE (OUTPATIENT)
Dept: PAIN MANAGEMENT | Age: 57
End: 2022-05-16

## 2022-05-16 NOTE — TELEPHONE ENCOUNTER
I called Joseph Tomlinson to advise him that we had not received approval for his cervical epidural as of yet. He stated he was actually doing better and wants to hold off on it for now anyhow. He will discuss with Dr. Zuri Stone at his next appointment on 5/31/2022.

## 2022-05-17 ENCOUNTER — HOSPITAL ENCOUNTER (OUTPATIENT)
Dept: PHYSICAL THERAPY | Age: 57
Setting detail: THERAPIES SERIES
Discharge: HOME | End: 2022-05-17
Payer: COMMERCIAL

## 2022-05-17 PROCEDURE — 97110 THERAPEUTIC EXERCISES: CPT | Performed by: PHYSICAL THERAPIST

## 2022-05-17 NOTE — PROGRESS NOTES
[facility]  Phone: [phone] Fax: [phone]       Physical Therapy Daily Treatment Note  Date:  2022    Physical Therapy: Initial Evaluation    Patient: Tahmina Bonner (49 y.o. male)   Examination Date:   Plan of Care Certification Period: 2022 to       :  1965 ;    Confirmed: Yes MRN: [id number]  CSN: [id number]   Insurance: Payor: [address] [place] [address]  [address] / Plan: [address] [place] [address] [address] / Product Type: *No Product type* /   Insurance ID: [id number] - (Medicaid Managed) Secondary Insurance (if applicable):    Referring Physician: Any Gusman MD [facility]   PCP: Malathi Galvan DO Visits to Date/Visits Approved: 3 /      No Show/Cancelled Appts:   /       Medical Diagnosis: Cervicalgia [M54.2]  Radiculopathy, cervical region [M54.12] M54.2 (ICD-10-CM) - [id number] (ICD-10-CM) - Cervical radiculopathy  Treatment Diagnosis:         Time In:     1500   Time Out:     1535     Subjective:  Pt presents to PT for second treatment session. States he has remained consistent with HEP. Pt reports overall less cervical pain. Does cont to have intermittent tingling across LUE. Feels he can maintain better posturing. Exercises:  Exercise/Equipment Resistance/Repetitions Other comments   UBE   x6mins           Pulley flex             abd   2mins  2mins           shrugs   x20    scap retractions    x20           Wand flexion     Wand abd 2x10  2x10           Cervical AROM R SB/ B Rot     5x10s     Upper thoracic str    5x10s                                                              Other Therapeutic Activities:      Home Exercise Program:      Manual Treatments:      Modalities:  NT    Timed Code Treatment Minutes:       Total Treatment Minutes:      Treatment/Activity Tolerance:  [x] Patient tolerated treatment well [] Patient limited by fatigue  [] Patient limited by pain  [] Patient limited by other medical complications  [] Other: Good tolerance to therapy. ROM of cervical region has improved all directions. Decreased cervical pain with increased cervical and shoulder motions. Less pain noted with exercises today. Pt instructed to cont with HEP 3x/day.      Plan:   [x] Continue per plan of care [] Alter current plan (see comments)  [] Plan of care initiated [] Hold pending MD visit [] Discharge  Plan for Next Session:         Treatment Charges: Mins Units   Initial Evaluation     Re-Evaluation     Ther Exercise         TE 35 2   Manual Therapy     MT     Ther Activities        TA     Gait Training          GT     Neuro Re-education NR     Modalities     Non-Billable Service Time  -   Other     Total Time/Units 35 2     Electronically signed by:  Lissett Jackson PT

## 2022-05-24 ENCOUNTER — HOSPITAL ENCOUNTER (OUTPATIENT)
Dept: PHYSICAL THERAPY | Age: 57
Setting detail: THERAPIES SERIES
End: 2022-05-24
Payer: COMMERCIAL

## 2022-05-27 NOTE — PROGRESS NOTES
[id number] [facility]        [address]  GAMALIEL WYLIE [facility], [address] [place] [address]  Dept: [phone]        Follow up Note      Mojgan Dunham     Date of Visit:  22     CC:  Patient presents for follow up   Chief Complaint   Patient presents with    Follow-up       HPI:    Pain is better. Change in quality of symptoms:yes - rt shoulder pain since fall. Medication side effects:anger  with pregabalin  Recent diagnostic testing:none. Recent interventional procedures:none. He has been on anticoagulation medications to include ASA and NSAIDS and has not been on herbal supplements.   He is not diabetic.     Imagin lumbar MRI -  FINDINGS:   BONES/ALIGNMENT: The vertebral body heights appear maintained.  There is   grade 1 anterolisthesis at L4-L5 with mild retrolisthesis at L5-S1.  There is   loss of disc space height with Modic type 2 degenerative endplate changes at   R6-J4.  Minimal bone marrow edema is seen associated with L4-L5 facet   arthrosis bilaterally.  Otherwise, the bone marrow signal demonstrates no   acute abnormality.       SPINAL CORD: The conus terminates at a normal level.  There is question of   clumping of the caudal quadrant nerve roots.       SOFT TISSUES: No paraspinal mass identified.       L1-L2: There is a left paracentral disc protrusion contacting the ventral   thecal sac.  No significant spinal canal stenosis.  No significant neural   foraminal narrowing.       L2-L3: There is a disc bulge, which contacts the ventral thecal sac.  Minimal   spinal canal stenosis.  Facet arthrosis contributes to minimal right neural   foraminal narrowing.  No significant left neural foraminal narrowing.       L3-L4: There is a disc bulge with a left foraminal predominance indenting on   the ventral thecal sac.  Mild-to-moderate spinal canal stenosis.  Facet   arthrosis contributes to minimal bilateral neural foraminal narrowing.       L4-L5: There is a disc bulge with buckling of the ligamentum flavum and facet   arthrosis.  Severe spinal canal stenosis.  Mild-to-moderate right and mild   left neural foraminal narrowing.       L5-S1: There is a disc bulge with a central disc protrusion along with   bilateral facet arthrosis.  Moderate to severe right and moderate left neural   foraminal narrowing.           Impression   1. Severe spinal canal stenosis at L4-L5 with mild-to-moderate stenosis at   L3-L4.  Minimal spinal canal stenosis at L2-L3. 2. Multilevel neural foraminal narrowing as above. 3. Grade 1 anterolisthesis at L4-L5 with mild retrolisthesis at L5-S1.   4. There is question of clumping of the cauda quadrant nerve roots, which can   be seen with prior arachnoiditis. 3/2022 MRI cervical -  RESULT:     Counting reference:  Craniocervical junction.   Anatomic Variants:  None. Localizer images:  No additional findings. Alignment:    There is straightening of normal cervical lordosis.  There   is very trace retrolisthesis of C5 on C6. Craniocervical junction:    Craniocervical junction is normal.     Cord:  The visualized cord is within normal limits of signal intensity   and morphology. Bone marrow signal/fracture:    No evidence of pathologic marrow   infiltration.  No evidence of prior fracture.  There are Modic type I   degenerative endplate changes at F6-J1 with moderate disc space height   loss.  There is congenital narrowing of the spinal canal.     Cervical soft tissues:    The paraspinal soft tissues are within normal   limits. C2-C3: Canal and foramina are patent. C3-C4: Minor disc bulge is seen with mild narrowing of the spinal canal.     There is moderate left neural foraminal narrowing due to uncovertebral   arthropathy.  There is no right neural foraminal narrowing.      C4-C5: There is right central disc protrusion that mildly impresses on   the ventral cord and there is mild to moderate narrowing of the spinal   canal. Tierney Aragon is mild right neural foraminal narrowing due to   uncovertebral arthropathy.  There is no left neural foraminal narrowing. C5-C6: There is disc osteophyte complex asymmetric to the left that   effaces the ventral CSF impinging on the ventral cord and causing   moderate stenosis of the spinal canal.  There is severe left neural   foraminal stenosis due to uncovertebral arthropathy and disc bulge.  The   right neural foramen is patent. C6-C7: Minor disc bulge and mild ligamentum flavum thickening is seen   with mild narrowing of the spinal canal.  There is mild right neural   foraminal narrowing due to uncovertebral arthropathy.  There is no left   neural foraminal narrowing. C7-T1: There is moderate left neural foraminal narrowing due to   uncovertebral and facet joint arthropathy.  There is minor disc bulge   without spinal canal or right neural foraminal narrowing. IMPRESSION   IMPRESSION:     1.  Multilevel degenerative cervical spondylosis. 2.  At C5-C6 there is moderate stenosis of the spinal canal with cord   impingement.  There is also severe left neural foraminal stenosis. 3.  Congenital narrowing of the spinal canal.   4.  Modic type I degenerative endplate change at B7-L7. 5.  Level by level description as detailed. Anatomic Variant:  None.  Assume 7 cervical vertebrae with counting from   the craniocervical junction.      2022 cervical xray -  FINDINGS:   No fracture or malalignment.  No evidence of instability on flexion or   extension imaging.  Mild narrowing of disc space at C5/C6.  No prevertebral   soft tissue edema.           Impression   1. No evidence of fracture or malalignment. 2. No evidence of instability on flexion or extension imaging.         Potential Aberrant Drug-Related Behavior:      Urine Drug Screenin2022 consistent    OARRS report:  2022 consistent    Past Medical History:   Diagnosis Date    Asthma     Bronchitis     COPD (chronic obstructive pulmonary disease) ([facility] [address]     Coronary artery disease     Hypertension        History reviewed. No pertinent surgical history. Prior to Admission medications    Medication Sig Start Date End Date Taking? Authorizing Provider   ibuprofen (ADVIL;MOTRIN) 800 MG tablet Take 800 mg by mouth three times daily   Yes Historical Provider, MD   cetirizine (ZYRTEC) 10 MG tablet Take 10 mg by mouth daily   Yes Historical Provider, MD   loratadine (CLARITIN) 10 MG capsule Take 10 mg by mouth daily   Yes Historical Provider, MD   pregabalin (LYRICA) 150 MG capsule Take 1 capsule by mouth 2 times daily for 30 days. 5/3/22 6/2/22 Yes Raiza Rodrigez DO   albuterol sulfate  (90 Base) MCG/ACT inhaler INHALE 2 PUFFS BY MOUTH EVERY 4 HOURS AS NEEDED FOR SHORTNESS OF BREATH 12/24/21  Yes Historical Provider, MD   SYMBICORT 160-4.5 MCG/ACT AERO INHALE 2 PUFFS BY MOUTH TWICE DAILY 12/22/21  Yes Historical Provider, MD   ASPIRIN LOW DOSE 81 MG EC tablet TAKE 1 TABLET BY MOUTH EVERY DAY 12/11/21  Yes Historical Provider, MD   amLODIPine (NORVASC) 5 MG tablet TAKE 1 TABLET BY MOUTH EVERY DAY FOR BLOOD PRESSURE 12/11/21  Yes Historical Provider, MD   losartan-hydroCHLOROthiazide (HYZAAR) 100-25 MG per tablet TAKE 1 TABLET BY MOUTH DAILY FOR BLOOD PRESSURE 11/29/21  Yes Historical Provider, MD   Multiple Vitamin (MULTIVITAMIN ADULT PO) Take by mouth daily   Yes Historical Provider, MD   pregabalin (LYRICA) 75 MG capsule Take 1 capsule by mouth 2 times daily for 7 days.   Patient not taking: Reported on 5/31/2022 4/26/22 5/3/22  Raiza Rodrigez DO       No Known Allergies    Social History     Socioeconomic History    Marital status:      Spouse name: Not on file    Number of children: Not on file    Years of education: Not on file    Highest education level: Not on file   Occupational History    Not on file   Tobacco Use    Smoking status: Never Smoker    Smokeless tobacco: Never Used   Substance and Sexual Activity    Alcohol use: Never    Drug use: Never    Sexual activity: Not on file   Other Topics Concern    Not on file   Social History Narrative    Not on file     Social Determinants of Health     Financial Resource Strain:     Difficulty of Paying Living Expenses: Not on file   Food Insecurity:     Worried About Running Out of Food in the Last Year: Not on file    Gume of Food in the Last Year: Not on file   Transportation Needs:     Lack of Transportation (Medical): Not on file    Lack of Transportation (Non-Medical): Not on file   Physical Activity:     Days of Exercise per Week: Not on file    Minutes of Exercise per Session: Not on file   Stress:     Feeling of Stress : Not on file   Social Connections:     Frequency of Communication with Friends and Family: Not on file    Frequency of Social Gatherings with Friends and Family: Not on file    Attends Congregational Services: Not on file    Active Member of Clubs or Organizations: Not on file    Attends Club or Organization Meetings: Not on file    Marital Status: Not on file   Intimate Partner Violence:     Fear of Current or Ex-Partner: Not on file    Emotionally Abused: Not on file    Physically Abused: Not on file    Sexually Abused: Not on file   Housing Stability:     Unable to Pay for Housing in the Last Year: Not on file    Number of [place] in the Last Year: Not on file    Unstable Housing in the Last Year: Not on file       Family History   Problem Relation Age of Onset    Arthritis Mother     Diabetes Mother     Hypertension Mother     Hypertension Father     Diabetes Brother        REVIEW OF SYSTEMS:     Jb denies fever/chills, chest pain, shortness of breath, new bowel or bladder complaints. All other review of systems was negative.     PHYSICAL EXAMINATION:      /74   Pulse 84   Temp 97.8 °F (36.6 °C) (Infrared)   Resp 16   SpO2 94%     General:       General appearance:pleasant and well-hydrated, in no distress and A & O x3  Build:Normal Weight  Function:Rises from a seated position with difficulty     HEENT:     Head:normocephalic, atraumatic  Pupils:regular, round, equal  Sclera: icterus absent     Lungs:     Breathing:normal breathing pattern     Abdomen:     Shape:non-distended  Tenderness:none  Guarding:none     Cervical spine:     Inspection:normal  Palpation:tenderness paravertebral muscles, tenderness trapezium, left, right positive.   Range of motion:abnormal mildly flexion, extension rotation bilateral and is  painful.     Musculoskeletal:     Trigger points in trapezius:absent bilaterally  Trigger points in rhomboids:absent bilaterally  Trigger points in Paraveteral:absent bilaterally  Trigger points in supraspinatus/infraspinatus:absent  Spurling's:negative right, negative left    Giron's:negative right, negative left      Extremities:     Tremors:None bilaterally upper and lower  Range of motion:unable to abduct rt shoulder actively, able to do passive ROM, no TTP  Intact:Yes  Varicose veins:absent   Pulses:present Lt radial  Cyanosis:none  Edema:none x all 4 extremities     Neurological:     Sensory:normal to light touch RUE     Motor:      Right Grip5/5              Left Grip5/5               Right Bicep5/5           Left Bicep5/5              Right Triceps5/5       Left Triceps5/5          Right Deltoid5/5     Left Deltoid5/5                     Reflexes:       Right Brachioradialis reflex2+  Left Brachioradialis reflex2+  Right Biceps reflex2+  Left Biceps reflex2+  Right Triceps reflex2+  Left Triceps reflex2+     Gait:normal     Dermatology:     Skin:no rashes or lesions noted     Impression:     Cervical pain and left upper extremity radiculopathy in C5 and C6 distribution  3/2022 cervical MRI shows herniated disc at C5-C6 there is moderate stenosis of the spinal canal with cord impingement with severe left neural foraminal stenosis  PMHx: environ allergies, COPD/asthma, HTN, CAD  Moved her from [facility] 12/2020 due to health concerns and [facility] does not had an adequate health care system  Has not worked since coming to the [place] due to health issues  Has lumbar symptoms as well, scheduled for lumbar MRI in a few days    He fell 9 days ago onto his rt shoulder, since that time has decreased shoulder function and pain  He is able to passively perform rt shoulder ROM but cannot actively abduct thus it appears he has a RTC injury     Plan:     Rt shoulder xray ordered - he will get done prior to his next PT session so he can assure there is no fx before participating  Urine screen and OARRS report reviewed  Failed pregabalin 150 mg BD due to feeling angry, he will try 150 mg at HS for now and if this is not helpful or he continues with side effects then we can change to gabapentin  Continues with PT, will add rt shoulder to PT order  Diclofenac gel  C7-T1 VERNA #1 - r/b and procedure discussed, denied until he completes PT  Patient encouraged to stay active   Treatment plan discussed with the patient including medication and procedure side effects     Cc:  Referring physician    KIMANI Gilman.

## 2022-05-31 ENCOUNTER — OFFICE VISIT (OUTPATIENT)
Dept: PAIN MANAGEMENT | Age: 57
End: 2022-05-31
Payer: COMMERCIAL

## 2022-05-31 VITALS
DIASTOLIC BLOOD PRESSURE: 74 MMHG | RESPIRATION RATE: 16 BRPM | HEART RATE: 84 BPM | TEMPERATURE: 97.8 F | OXYGEN SATURATION: 94 % | SYSTOLIC BLOOD PRESSURE: 132 MMHG

## 2022-05-31 DIAGNOSIS — M54.12 CERVICAL RADICULOPATHY: ICD-10-CM

## 2022-05-31 DIAGNOSIS — G89.4 CHRONIC PAIN SYNDROME: Primary | ICD-10-CM

## 2022-05-31 DIAGNOSIS — M48.02 CERVICAL STENOSIS OF SPINE: ICD-10-CM

## 2022-05-31 DIAGNOSIS — M25.511 ACUTE PAIN OF RIGHT SHOULDER: ICD-10-CM

## 2022-05-31 DIAGNOSIS — M50.90 CERVICAL DISC DISORDER: ICD-10-CM

## 2022-05-31 PROCEDURE — 3017F COLORECTAL CA SCREEN DOC REV: CPT | Performed by: PAIN MEDICINE

## 2022-05-31 PROCEDURE — G8417 CALC BMI ABV UP PARAM F/U: HCPCS | Performed by: PAIN MEDICINE

## 2022-05-31 PROCEDURE — G8427 DOCREV CUR MEDS BY ELIG CLIN: HCPCS | Performed by: PAIN MEDICINE

## 2022-05-31 PROCEDURE — 99213 OFFICE O/P EST LOW 20 MIN: CPT

## 2022-05-31 PROCEDURE — 1036F TOBACCO NON-USER: CPT | Performed by: PAIN MEDICINE

## 2022-05-31 PROCEDURE — 99213 OFFICE O/P EST LOW 20 MIN: CPT | Performed by: PAIN MEDICINE

## 2022-05-31 PROCEDURE — 99214 OFFICE O/P EST MOD 30 MIN: CPT | Performed by: PAIN MEDICINE

## 2022-05-31 RX ORDER — PREGABALIN 150 MG/1
150 CAPSULE ORAL EVERY EVENING
Qty: 30 CAPSULE | Refills: 1 | Status: SHIPPED
Start: 2022-05-31 | End: 2022-08-02 | Stop reason: SDUPTHER

## 2022-05-31 NOTE — PROGRESS NOTES
Do you currently have any of the following:    Fever: No  Headache:  No  Cough: No  Shortness of breath: No  Exposed to anyone with these symptoms: Rosalina Rodriguez Never presents to the [facility] on 5/31/2022. Kasi Jane is complaining of left hand tremors and right arm pain. The tremor and arm pain is intermittent. Pain is rated on his best day at a 3, on his worst day at a 10, and on average at a 5 on the VAS scale. He took his last dose of Motrin two weeks ago. Any procedures since your last visit: No.    Pacemaker or defibrillator: No     He is  on NSAIDS and is  on anticoagulation medications to include ASA and is managed by Debra Edwards DO  .     Medication Contract and Consent for Opioid Use Documents Filed      No documents found                /74   Pulse 84   Temp 97.8 °F (36.6 °C) (Infrared)   Resp 16   SpO2 94%      No LMP for male patient.

## 2022-06-01 ENCOUNTER — TELEPHONE (OUTPATIENT)
Dept: NEUROSURGERY | Age: 57
End: 2022-06-01

## 2022-06-01 ENCOUNTER — HOSPITAL ENCOUNTER (OUTPATIENT)
Age: 57
Discharge: HOME | End: 2022-06-03
Payer: COMMERCIAL

## 2022-06-01 ENCOUNTER — HOSPITAL ENCOUNTER (OUTPATIENT)
Dept: GENERAL RADIOLOGY | Age: 57
Discharge: HOME | End: 2022-06-03
Payer: COMMERCIAL

## 2022-06-01 DIAGNOSIS — M25.511 ACUTE PAIN OF RIGHT SHOULDER: ICD-10-CM

## 2022-06-01 PROCEDURE — 73030 X-RAY EXAM OF SHOULDER: CPT

## 2022-06-01 NOTE — TELEPHONE ENCOUNTER
Per Dr. Nadine Patel last note;    Nonetheless he will return to clinic with those results in hand;    Please have patient make an appointment to discuss results.

## 2022-06-02 ENCOUNTER — HOSPITAL ENCOUNTER (OUTPATIENT)
Dept: GENERAL RADIOLOGY | Age: 57
Discharge: HOME | End: 2022-06-04
Payer: COMMERCIAL

## 2022-06-02 ENCOUNTER — OFFICE VISIT (OUTPATIENT)
Dept: NEUROSURGERY | Age: 57
End: 2022-06-02
Payer: COMMERCIAL

## 2022-06-02 ENCOUNTER — HOSPITAL ENCOUNTER (OUTPATIENT)
Age: 57
Discharge: HOME | End: 2022-06-04
Payer: COMMERCIAL

## 2022-06-02 ENCOUNTER — HOSPITAL ENCOUNTER (OUTPATIENT)
Dept: PHYSICAL THERAPY | Age: 57
Setting detail: THERAPIES SERIES
Discharge: HOME | End: 2022-06-02
Payer: COMMERCIAL

## 2022-06-02 VITALS
OXYGEN SATURATION: 95 % | DIASTOLIC BLOOD PRESSURE: 73 MMHG | HEART RATE: 79 BPM | HEIGHT: 65 IN | RESPIRATION RATE: 20 BRPM | WEIGHT: 204 LBS | TEMPERATURE: 98.1 F | BODY MASS INDEX: 33.99 KG/M2 | SYSTOLIC BLOOD PRESSURE: 116 MMHG

## 2022-06-02 DIAGNOSIS — M48.062 LUMBAR STENOSIS WITH NEUROGENIC CLAUDICATION: ICD-10-CM

## 2022-06-02 DIAGNOSIS — M48.062 LUMBAR STENOSIS WITH NEUROGENIC CLAUDICATION: Primary | ICD-10-CM

## 2022-06-02 PROCEDURE — G8427 DOCREV CUR MEDS BY ELIG CLIN: HCPCS | Performed by: NEUROLOGICAL SURGERY

## 2022-06-02 PROCEDURE — 97110 THERAPEUTIC EXERCISES: CPT | Performed by: PHYSICAL THERAPIST

## 2022-06-02 PROCEDURE — 3017F COLORECTAL CA SCREEN DOC REV: CPT | Performed by: NEUROLOGICAL SURGERY

## 2022-06-02 PROCEDURE — 99213 OFFICE O/P EST LOW 20 MIN: CPT | Performed by: NEUROLOGICAL SURGERY

## 2022-06-02 PROCEDURE — 1036F TOBACCO NON-USER: CPT | Performed by: NEUROLOGICAL SURGERY

## 2022-06-02 PROCEDURE — G8417 CALC BMI ABV UP PARAM F/U: HCPCS | Performed by: NEUROLOGICAL SURGERY

## 2022-06-02 PROCEDURE — 99212 OFFICE O/P EST SF 10 MIN: CPT

## 2022-06-02 PROCEDURE — 72120 X-RAY BEND ONLY L-S SPINE: CPT

## 2022-06-02 NOTE — PROGRESS NOTES
[facility]  Phone: [phone] Fax: [phone]       Physical Therapy Daily Treatment Note  Date:  2022    Physical Therapy: Initial Evaluation    Patient: Caitlin Dejesus (76 y.o. male)   Examination Date:   Plan of Care Certification Period: 2022 to       :  1965 ;    Confirmed: Yes MRN: [id number]  CSN: [id number]   Insurance: Payor: [address] [place] [address]  [address] / Plan: [address] [place] [address] [address] / Product Type: *No Product type* /   Insurance ID: [id number] - (Medicaid Managed) Secondary Insurance (if applicable):    Referring Physician: Trisha Lock MD [facility]   PCP: Fransisco Medina DO Visits to Date/Visits Approved: 3 /      No Show/Cancelled Appts:   /       Medical Diagnosis: Cervicalgia [M54.2]  Radiculopathy, cervical region [M54.12] M54.2 (ICD-10-CM) - [id number] (ICD-10-CM) - Cervical radiculopathy  Treatment Diagnosis:         Time In:     1500   Time Out:     1540     Subjective:  Pt presents to PT with reports of recent fall on R shoulder. Reports having an xray with no fracture- diagnosed with sprain per pt    Exercises:  Exercise/Equipment Resistance/Repetitions Other comments   UBE   x6mins fwd, 3mins bwd           Pulley flex             abd   2mins  2mins           shrugs   x20    scap retractions    x20           Wand flexion     Wand abd 2x10  2x10           Cervical AROM R SB/ B Rot     5x10s     Upper thoracic str    5x10s                                                              Other Therapeutic Activities:      Home Exercise Program:      Manual Treatments:      Modalities:  NT    Timed Code Treatment Minutes: Total Treatment Minutes:      Treatment/Activity Tolerance:  [x] Patient tolerated treatment well [] Patient limited by fatigue  [] Patient limited by pain  [] Patient limited by other medical complications  [] Other: Good tolerance to therapy.  ROM of cervical region has improved all directions. Minimal cervical symptoms remain per pt with intermittent mild radicular symptoms to LUE. Cont with there ex focusing on both cerivcal and shoulder regions. Pt instructed to cont with HEP 3x/day.      Plan:   [x] Continue per plan of care [] Alter current plan (see comments)  [] Plan of care initiated [] Hold pending MD visit [] Discharge  Plan for Next Session:         Treatment Charges: Mins Units   Initial Evaluation     Re-Evaluation     Ther Exercise         TE 35 2   Manual Therapy     MT     Ther Activities        TA     Gait Training          GT     Neuro Re-education NR     Modalities     Non-Billable Service Time 5 -   Other     Total Time/Units 40 2     Electronically signed by:  Richard Cho, PT

## 2022-06-02 NOTE — PROGRESS NOTES
[address] NEUROSURGERY  [facility] [address] [address] [phone]       Chief Complaint:   Chief Complaint   Patient presents with    Back Pain     back pain comes and goes, and is more in the legs, mri done, pt has had PT @ [facility] but no injections       HPI:     I had the pleasure of seeing Mera Melgar today in neurosurgical clinic. As you know, this 79-year-old, right-handed, delightful,  father of 2, non-smoker and former  with a history of polytrauma to the spine presented to us with a longstanding history of low back pain and with bilateral leg cramping. He continues to endorse sharp pain on the bottom of both feet. He stated that bending forward alleviates the pain. He denied any aleah numbness or weakness or change in bowel or bladder function. We had ordered an MRI to address his lumbar spine issues. He presents for discussion of the results and neck steps. With respect to follow-up for his cervicalgia and left arm pain he states that physical therapy has helped him tremendously as has Lyrica and he is very pleased with both pain management and PT. Past Medical History:   Diagnosis Date    Asthma     Bronchitis     COPD (chronic obstructive pulmonary disease) ([address]     Coronary artery disease     Hypertension      History reviewed. No pertinent surgical history.    Family History   Problem Relation Age of Onset    Arthritis Mother     Diabetes Mother     Hypertension Mother     Hypertension Father     Diabetes Brother       Social History     Socioeconomic History    Marital status:      Spouse name: Not on file    Number of children: Not on file    Years of education: Not on file    Highest education level: Not on file   Occupational History    Not on file   Tobacco Use    Smoking status: Never Smoker    Smokeless tobacco: Never Used   Vaping Use  Vaping Use: Never used   Substance and Sexual Activity    Alcohol use: Never    Drug use: Never    Sexual activity: Not on file   Other Topics Concern    Not on file   Social History Narrative    Not on file     Social Determinants of Health     Financial Resource Strain:     Difficulty of Paying Living Expenses: Not on file   Food Insecurity:     Worried About Running Out of Food in the Last Year: Not on file    Gume of Food in the Last Year: Not on file   Transportation Needs:     Lack of Transportation (Medical): Not on file    Lack of Transportation (Non-Medical): Not on file   Physical Activity:     Days of Exercise per Week: Not on file    Minutes of Exercise per Session: Not on file   Stress:     Feeling of Stress : Not on file   Social Connections:     Frequency of Communication with Friends and Family: Not on file    Frequency of Social Gatherings with Friends and Family: Not on file    Attends Jehovah's witness Services: Not on file    Active Member of [address] [place] [address] or Organizations: Not on file    Attends Club or Organization Meetings: Not on file    Marital Status: Not on file   Intimate Partner Violence:     Fear of Current or Ex-Partner: Not on file    Emotionally Abused: Not on file    Physically Abused: Not on file    Sexually Abused: Not on file   Housing Stability:     Unable to Pay for Housing in the Last Year: Not on file    Number of [place] in the Last Year: Not on file    Unstable Housing in the Last Year: Not on file       Medications:   Current Outpatient Medications   Medication Sig Dispense Refill    pregabalin (LYRICA) 150 MG capsule Take 1 capsule by mouth every evening for 30 days.  30 capsule 1    diclofenac sodium (VOLTAREN) 1 % GEL Apply 2 g topically 4 times daily 480 g 1    ibuprofen (ADVIL;MOTRIN) 800 MG tablet Take 800 mg by mouth three times daily      cetirizine (ZYRTEC) 10 MG tablet Take 10 mg by mouth daily      loratadine (CLARITIN) 10 MG capsule knee.  There is no pain to deep percussion nor palpation. ASSESSMENT:    I personally reviewed Jb Easley's radiographic images, particularly his MRI of the lumbar spine which demonstrates significant lumbar stenosis at L4-5 and 5 1.      1. Lumbar stenosis with neurogenic claudication  -     XR LUMBAR SPINE FLEXION AND EXTENSION ONLY; Future  -     [facility] [place] [place]  -     EMG; Future      MEDICAL DECISION MAKING & PLAN:    I showed  and Mrs. Sukhdev Portillo the images and explained the findings. Certainly a trial of conservative treatment at this juncture is indicated namely consideration for epidural steroid injection to help alleviate his immediate back pain as well as physical therapy. Should this fail to improve his symptoms or he worsens I be happy to see him sooner in clinic but otherwise I like to see him in 4 weeks time after these trials to reassess. I also ordered flexion-extension x-rays in anticipation that if surgical intervention is required mobile listhesis needs to be addressed as well. Thank you so much for allowing us to participate in the care of this patient.     Liana Velazquez MD

## 2022-07-11 ENCOUNTER — HOSPITAL ENCOUNTER (OUTPATIENT)
Dept: NEUROLOGY | Age: 57
Discharge: HOME | End: 2022-07-11
Payer: COMMERCIAL

## 2022-07-11 VITALS — WEIGHT: 200 LBS | HEIGHT: 64 IN | BODY MASS INDEX: 34.15 KG/M2

## 2022-07-11 DIAGNOSIS — M48.062 LUMBAR STENOSIS WITH NEUROGENIC CLAUDICATION: ICD-10-CM

## 2022-07-11 PROCEDURE — 95886 MUSC TEST DONE W/N TEST COMP: CPT

## 2022-07-11 PROCEDURE — 95911 NRV CNDJ TEST 9-10 STUDIES: CPT

## 2022-07-11 NOTE — PROCEDURES
[facility]  [address] [facility], [address] [place] [address]  Phone: [phone]  Fax: [phone]      Referring Provider: Libby Marks MD  Primary Care Physician: Sj Dickey DO  Patient Name: Leila Srinivasan  Patient YOB: 1965  Gender: male  BMI: Body mass index is 34.33 kg/m². Height 5' 4\" (1.626 m), weight 200 lb (90.7 kg). 7/11/2022    Reason for referral: EMG    Description of clinical problem:   No chief complaint on file. Pain Yes   ; Numbness/tingling  No; Weakness  Yes       Brief physical exam:   Sensory deficit No; Weakness Yes; Atrophy  Yes; Reflex abnormality Yes      Study Limitations:  none    Summary of Findings:   Nerve conduction studies:   · The following nerve conduction studies were abnormal:   · none  · All other nerve conduction studies listed in the table above were normal in latency, amplitude and conduction velocity. [facility]  Electrodiagnostic Laboratory  [place]        Full Name: Leila Srinivasan Gender: Male  MRN: [id number] YOB: 1965  Location[de-identified] Outpt. Visit Date: 7/11/2022 09:27  Age: 49 Years 5 Months Old  Examining Physician: Dr. Cachorro Navarro  Referring Physician: Dr. Katie Rutherford  Technician: Jozef Howe   Height: 5 feet 4 inch  Weight: 200 lbs  Notes: Lumbar stenosis M48.062      Motor NCS      Nerve / Sites Lat. Amplitude Amp. 1-2 Distance Lat Diff Velocity Temp.    ms mV % cm ms m/s °C   L Peroneal - EDB      Ankle 4.69 5.2 100 8   31.6      Pop fossa 11.93 4.0 77.9 33 7.24 46 31.6   R Peroneal - EDB      Ankle 4.22 10.9 100 8   31.9      Pop fossa 10.73 9.6 88.7 33 6.51 51 31.9   R Tibial - AH      Ankle 4.11 16.1 100 8   31      Pop fossa 12.55 10.5 65.4 37 8.44 44 31   L Tibial - AH      Ankle 3.65 15.8 100 8   31      Pop fossa 11.98 14.5 92.2 38 8.33 46 31       Sensory NCS      Nerve / Sites Onset Lat Peak Lat PP Amp Distance Velocity Temp.    ms ms µV cm m/s °C   L Superficial peroneal - Ankle      Lat leg 2.14 2.81 28.4 10 47 32   R Superficial peroneal - Ankle      Lat leg 1.98 2.76 37.6 10 51 31.7   R Sural - Ankle (Calf)      Calf 2.76 3.49 16.0 14 51 31   L Sural - Ankle (Calf)      Calf 3.07 3.65 17.1 14 46 31       F  Wave      Nerve F Lat M Lat F-M Lat    ms ms ms   L Peroneal - EDB 46.8 5.8 41.0   R Peroneal - EDB 47.5 5.2 42.3   R Tibial - AH 51.3 5.2 46.1   L Tibial - AH 48.1 4.3 43.8       H Reflex      Nerve Lat Hmax    ms   R Tibial - Soleus 31.4   L Tibial - Soleus 32. 2       EMG         EMG Summary Table     Spontaneous MUAP Recruitment   Muscle IA Fib PSW Fasc H.F. Amp Dur. PPP Pattern   R. Extensor digitorum brevis N None None None None N N N N   R. Gastrocnemius (Lateral head) N None None None None N N N N   R. Gastrocnemius (Medial head) N None None None None N N N N   R. Tibialis anterior N None None None None N N N N   R. Vastus lateralis N None None None None N N N N   R. Lumbar paraspinals (low) N None None None None N N N N   R. Lumbar paraspinals (mid) N None None None None N N 1+ N   R. Lumbar paraspinals (up) N None None None None N N N N                     Needle EMG:   · Needle EMG was performed using a monopolar needle. · The following abnormalities were seen on needle EMG: mid lumbar paraphasic potentials   All other muscles tested, as listed in the table above demonstrated normal amplitude, duration, phases and recruitment and no active denervation signs were seen. Diagnostic Interpretation:   Normal emg of lower extremities; however, lumbar paraphasic potentials correlate with mri finding of severe stenosis such that patient is likely symptomatic from lumbar stenosis. Clinical correlation advised.             Technologist: Virginia Nevarez MD    Nerve conduction studies and electromyography were performed according to our laboratory policies and procedures which can be provided upon request. All abnormal values are identified in the table.  Laboratory normal values can also be provided upon request.       Cc: MD Laura Porter, DO

## 2022-08-02 ENCOUNTER — OFFICE VISIT (OUTPATIENT)
Dept: PAIN MANAGEMENT | Age: 57
End: 2022-08-02
Payer: COMMERCIAL

## 2022-08-02 ENCOUNTER — PREP FOR PROCEDURE (OUTPATIENT)
Dept: PAIN MANAGEMENT | Age: 57
End: 2022-08-02

## 2022-08-02 VITALS
TEMPERATURE: 98.2 F | OXYGEN SATURATION: 94 % | WEIGHT: 204 LBS | DIASTOLIC BLOOD PRESSURE: 88 MMHG | HEIGHT: 65 IN | RESPIRATION RATE: 16 BRPM | BODY MASS INDEX: 33.99 KG/M2 | SYSTOLIC BLOOD PRESSURE: 142 MMHG

## 2022-08-02 DIAGNOSIS — M48.062 SPINAL STENOSIS OF LUMBAR REGION WITH NEUROGENIC CLAUDICATION: ICD-10-CM

## 2022-08-02 DIAGNOSIS — G89.4 CHRONIC PAIN SYNDROME: Primary | ICD-10-CM

## 2022-08-02 DIAGNOSIS — M54.16 LUMBAR RADICULOPATHY: Primary | ICD-10-CM

## 2022-08-02 DIAGNOSIS — M54.12 CERVICAL RADICULOPATHY: ICD-10-CM

## 2022-08-02 DIAGNOSIS — M50.90 CERVICAL DISC DISORDER: ICD-10-CM

## 2022-08-02 DIAGNOSIS — M54.41 CHRONIC BILATERAL LOW BACK PAIN WITH BILATERAL SCIATICA: ICD-10-CM

## 2022-08-02 DIAGNOSIS — M54.42 CHRONIC BILATERAL LOW BACK PAIN WITH BILATERAL SCIATICA: ICD-10-CM

## 2022-08-02 DIAGNOSIS — M48.02 CERVICAL STENOSIS OF SPINE: ICD-10-CM

## 2022-08-02 DIAGNOSIS — M25.511 ACUTE PAIN OF RIGHT SHOULDER: ICD-10-CM

## 2022-08-02 DIAGNOSIS — G89.29 CHRONIC BILATERAL LOW BACK PAIN WITH BILATERAL SCIATICA: ICD-10-CM

## 2022-08-02 DIAGNOSIS — M54.16 LUMBAR RADICULOPATHY: ICD-10-CM

## 2022-08-02 PROCEDURE — 3017F COLORECTAL CA SCREEN DOC REV: CPT | Performed by: PAIN MEDICINE

## 2022-08-02 PROCEDURE — G8417 CALC BMI ABV UP PARAM F/U: HCPCS | Performed by: PAIN MEDICINE

## 2022-08-02 PROCEDURE — 99213 OFFICE O/P EST LOW 20 MIN: CPT | Performed by: PAIN MEDICINE

## 2022-08-02 PROCEDURE — 1036F TOBACCO NON-USER: CPT | Performed by: PAIN MEDICINE

## 2022-08-02 PROCEDURE — G8427 DOCREV CUR MEDS BY ELIG CLIN: HCPCS | Performed by: PAIN MEDICINE

## 2022-08-02 RX ORDER — PREGABALIN 150 MG/1
150 CAPSULE ORAL EVERY EVENING
Qty: 30 CAPSULE | Refills: 2 | Status: SHIPPED | OUTPATIENT
Start: 2022-08-02 | End: 2022-09-01

## 2022-08-02 NOTE — PROGRESS NOTES
Do you currently have any of the following:    Fever: No  Headache:  No  Cough: No  Shortness of breath: No  Exposed to anyone with these symptoms: No         Ira Shock presents to the [facility] on 8/2/2022. Luis Fernando Smith is complaining of pain in his low back. The pain is constant. The pain is described as aching, dull, sharp, and miserable. Pain is rated on his best day at a 7, on his worst day at a 10, and on average at a 8 on the VAS scale. He took his last dose of Lyrica and ibuprofen  today. Any procedures since your last visit: No    Pacemaker or defibrillator: No     He is  on NSAIDS and is not on anticoagulation medications. Medication Contract and Consent for Opioid Use Documents Filed        No documents found                    BP (!) 142/88   Temp 98.2 °F (36.8 °C) (Infrared)   Resp 16   Ht 5' 5\" (1.651 m)   Wt 204 lb (92.5 kg)   SpO2 94%   BMI 33.95 kg/m²      No LMP for male patient.

## 2022-08-02 NOTE — PROGRESS NOTES
[facility]        [address]  [facility], [address] [facility]  Dept: [phone]        Follow up Note      Ping Newsome     Date of Visit:  22     CC:  Patient presents for follow up   Chief Complaint   Patient presents with    Follow-up     Low back pain          HPI:    Pain is better. Change in quality of symptoms:no. Medication side effects: anger   with pregabalin  Recent diagnostic testing:none. Recent interventional procedures:none. He has been on anticoagulation medications to include ASA and NSAIDS and has not been on herbal supplements. He is not diabetic. Imagin/2022 xray rt shoulder -  FINDINGS:   Three views of the right shoulder were obtained. There is no acute fracture   dislocation right shoulder. There are degenerative changes of the [facility] joint. The glenohumeral joint is intact and unremarkable. There is no fracture of   the right scapula. Impression   1. There is no fracture or dislocation of the right shoulder   2. Significant degenerative changes of the [facility] joint. 2022 xray lumbar f/e -     FINDINGS:   Anterolisthesis of L4 on L5 is 7 mm in flexion, 4 mm in the neutral position   and 1 mm in extension. There is mild multilevel degenerative disc disease. There is moderate degenerative facet arthropathy from L4-S1. Impression   Findings of spinal instability at L4-5 as noted. Degenerative lumbar   spondylosis. 2022 EDX -  Diagnostic Interpretation:   Normal emg of lower extremities; however, lumbar paraphasic potentials correlate with mri finding of severe stenosis such that patient is likely symptomatic from lumbar stenosis. Clinical correlation advised.        Technologist: Sirena Russell MD     Nerve conduction studies and electromyography were performed according to our laboratory policies and procedures which can be provided upon request. All abnormal values are foraminal narrowing as above. 3. Grade 1 anterolisthesis at L4-L5 with mild retrolisthesis at L5-S1.   4. There is question of clumping of the cauda quadrant nerve roots, which can   be seen with prior arachnoiditis. 3/2022 MRI cervical -  RESULT:     Counting reference:  Craniocervical junction. Anatomic Variants:  None. Localizer images:  No additional findings. Alignment:    There is straightening of normal cervical lordosis. There   is very trace retrolisthesis of C5 on C6. Craniocervical junction:    Craniocervical junction is normal.     Cord: The visualized cord is within normal limits of signal intensity   and morphology. Bone marrow signal/fracture:    No evidence of pathologic marrow   infiltration. No evidence of prior fracture. There are Modic type I   degenerative endplate changes at Z1-Y1 with moderate disc space height   loss. There is congenital narrowing of the spinal canal.     Cervical soft tissues: The paraspinal soft tissues are within normal   limits. C2-C3: Canal and foramina are patent. C3-C4: Minor disc bulge is seen with mild narrowing of the spinal canal.     There is moderate left neural foraminal narrowing due to uncovertebral   arthropathy. There is no right neural foraminal narrowing. C4-C5: There is right central disc protrusion that mildly impresses on   the ventral cord and there is mild to moderate narrowing of the spinal   canal.  There is mild right neural foraminal narrowing due to   uncovertebral arthropathy. There is no left neural foraminal narrowing. C5-C6: There is disc osteophyte complex asymmetric to the left that   effaces the ventral CSF impinging on the ventral cord and causing   moderate stenosis of the spinal canal.  There is severe left neural   foraminal stenosis due to uncovertebral arthropathy and disc bulge. The   right neural foramen is patent.      C6-C7: Minor disc bulge and mild ligamentum flavum thickening is seen   with mild narrowing of the spinal canal.  There is mild right neural   foraminal narrowing due to uncovertebral arthropathy. There is no left   neural foraminal narrowing. C7-T1: There is moderate left neural foraminal narrowing due to   uncovertebral and facet joint arthropathy. There is minor disc bulge   without spinal canal or right neural foraminal narrowing. IMPRESSION   IMPRESSION:     1.  Multilevel degenerative cervical spondylosis. 2.  At C5-C6 there is moderate stenosis of the spinal canal with cord   impingement. There is also severe left neural foraminal stenosis. 3.  Congenital narrowing of the spinal canal.   4.  Modic type I degenerative endplate change at G5-H0. 5.  Level by level description as detailed. Anatomic Variant:  None. Assume 7 cervical vertebrae with counting from   the craniocervical junction. 2022 cervical xray -  FINDINGS:   No fracture or malalignment. No evidence of instability on flexion or   extension imaging. Mild narrowing of disc space at C5/C6. No prevertebral   soft tissue edema. Impression   1. No evidence of fracture or malalignment. 2. No evidence of instability on flexion or extension imaging. Potential Aberrant Drug-Related Behavior:      Urine Drug Screenin2022 consistent    OARRS report:  2022 consistent    Past Medical History:   Diagnosis Date    Asthma     Bronchitis     COPD (chronic obstructive pulmonary disease) ([facility] [address]     Coronary artery disease     Hypertension        History reviewed. No pertinent surgical history. Prior to Admission medications    Medication Sig Start Date End Date Taking? Authorizing Provider   pregabalin (LYRICA) 150 MG capsule Take 1 capsule by mouth every evening for 30 days.  22 Yes Kalie Villareal, DO   diclofenac sodium (VOLTAREN) 1 % GEL Apply 2 g topically 4 times daily 22  Yes Kalie Villareal, DO   ibuprofen (ADVIL;MOTRIN) 800 MG tablet Take 800 mg by mouth three times daily   Yes Historical Provider, MD   loratadine (CLARITIN) 10 MG capsule Take 10 mg by mouth daily   Yes Historical Provider, MD   albuterol sulfate  (90 Base) MCG/ACT inhaler INHALE 2 PUFFS BY MOUTH EVERY 4 HOURS AS NEEDED FOR SHORTNESS OF BREATH 12/24/21  Yes Historical Provider, MD   SYMBICORT 160-4.5 MCG/ACT AERO INHALE 2 PUFFS BY MOUTH TWICE DAILY 12/22/21  Yes Historical Provider, MD   amLODIPine (NORVASC) 5 MG tablet TAKE 1 TABLET BY MOUTH EVERY DAY FOR BLOOD PRESSURE 12/11/21  Yes Historical Provider, MD   losartan-hydroCHLOROthiazide (HYZAAR) 100-25 MG per tablet TAKE 1 TABLET BY MOUTH DAILY FOR BLOOD PRESSURE 11/29/21  Yes Historical Provider, MD   Multiple Vitamin (MULTIVITAMIN ADULT PO) Take by mouth daily   Yes Historical Provider, MD   cetirizine (ZYRTEC) 10 MG tablet Take 10 mg by mouth daily  Patient not taking: Reported on 8/2/2022    Historical Provider, MD   ASPIRIN LOW DOSE 81 MG EC tablet TAKE 1 TABLET BY MOUTH EVERY DAY  Patient not taking: Reported on 8/2/2022 12/11/21   Historical Provider, MD       No Known Allergies    Social History     Socioeconomic History    Marital status:      Spouse name: Not on file    Number of children: Not on file    Years of education: Not on file    Highest education level: Not on file   Occupational History    Not on file   Tobacco Use    Smoking status: Never    Smokeless tobacco: Never   Vaping Use    Vaping Use: Never used   Substance and Sexual Activity    Alcohol use: Never    Drug use: Never    Sexual activity: Not on file   Other Topics Concern    Not on file   Social History Narrative    Not on file     Social Determinants of Health     Financial Resource Strain: Not on file   Food Insecurity: Not on file   Transportation Needs: Not on file   Physical Activity: Not on file   Stress: Not on file   Social Connections: Not on file   Intimate Partner Violence: Not on file   Housing Stability: Not on file       Family History   Problem Relation Age of Onset    Arthritis Mother     Diabetes Mother     Hypertension Mother     Hypertension Father     Diabetes Brother        REVIEW OF SYSTEMS:     Jb denies fever/chills, chest pain, shortness of breath, new bowel or bladder complaints. All other review of systems was negative. PHYSICAL EXAMINATION:      BP (!) 142/88   Temp 98.2 °F (36.8 °C) (Infrared)   Resp 16   Ht 5' 5\" (1.651 m)   Wt 204 lb (92.5 kg)   SpO2 94%   BMI 33.95 kg/m²     General:       General appearance:pleasant and well-hydrated, in no distress and A & O x3  Build:Normal Weight  Function:Rises from a seated position with difficulty     HEENT:     Head:normocephalic, atraumatic  Pupils:regular, round, equal  Sclera: icterus absent     Lungs:     Breathing:normal breathing pattern     Abdomen:     Shape:non-distended  Tenderness:none  Guarding:none     Cervical spine:     Inspection:normal  Palpation:tenderness paravertebral muscles, tenderness trapezium, left, right positive. Range of motion:abnormal mildly flexion, extension rotation bilateral and is  painful. Lumbar spine:    Spine inspection:normal   CVA tenderness:No   Palpation:tenderness paravertebral muscles, left>right positive. Range of motion:abnormal mildly in lateral bending, flexion, extension rotation bilateral and is painful.      Musculoskeletal:     Trigger points in trapezius:absent bilaterally  Trigger points in rhomboids:absent bilaterally  Trigger points in Paraveteral:absent bilaterally  Trigger points in supraspinatus/infraspinatus:absent  Spurling's:negative right, negative left    Giron's:negative right, negative left      Trigger points in Paraveteral:absent bilaterally  SI joint tenderness:positive right, negative left  JOYA test:not done right, not done left  Piriformis tenderness:negative right, negative left  Trochanteric bursa tenderness:negative right, negative left  SLR:positive right, positive left, sitting Extremities:     Tremors:None bilaterally upper and lower  Range of motion:slow to abduct rt shoulder, no ttp  Intact:Yes  Varicose veins:absent   Pulses:present Lt radial  Cyanosis:none  Edema:none x all 4 extremities     Neurological:     Sensory:normal to light touch RUE     Motor:      Right Grip5/5              Left Grip5/5               Right Bicep5/5           Left Bicep5/5              Right Triceps5/5       Left Triceps5/5          Right Deltoid5/5     Left Deltoid5/5       Right Quadriceps5/5          Left Quadriceps5/5           Right Gastrocnemius5/5    Left Gastrocnemius5/5  Right Ant Tibialis5/5  Left Ant Tibialis5/5               Reflexes:       Right Brachioradialis reflex2+  Left Brachioradialis reflex2+  Right Biceps reflex2+  Left Biceps reflex2+  Right Triceps reflex2+  Left Triceps reflex2+    Right Quadriceps reflex2+  Left Quadriceps reflex2+  Right Achilles reflex2+  Left Achilles reflex2+     Gait:normal     Dermatology:     Skin:no rashes or lesions noted     Impression:     Cervical pain and left upper extremity radiculopathy in C5 and C6 distribution  3/2022 cervical MRI shows herniated disc at C5-C6 there is moderate stenosis of the spinal canal with cord impingement with severe left neural foraminal stenosis  PMHx: environ allergies, COPD/asthma, HTN, CAD  LBP L>R posterior LE pain  2022 lumbar MRI shows severe L4-5 stenosis  2022 lumbar f/e films shows instability at L4-5  2022 EDX BLE shows normal but does show changes consistent with severe L4-5 stenosis  Moved her from [facility] 12/2020 due to health concerns and [facility] does not had an adequate health care system  Has not worked since coming to the [address] [place] [address],[address] due to health issues  Has lumbar symptoms as well - had eval with Dr. Kira Oneill    He fell 9 days prior to last visit onto his rt shoulder, since that time has decreased shoulder function and pain, this is improved 60%  He is able to passively perform rt shoulder ROM but could not actively abduct thus it appears he has a RTC injury at the last visit, today he is able to do this although it is slow  He does not have significant rt shoulder pain anymore  He did not return to PT as he felt the PT was too much for him, he has continued with an active HEP  He feels as though his cervical symptoms are also significantly improved  He feels as though his    He describes some stool>incontinence when he gets severe \"electrical pain\", has happened approx 3 times and it last occurred 2 weeks ago, he states \"I'm not pooing on myself like a baby, only a little bit comes out\"  He was to f/u with Dr. Alanna Villarreal 4 weeks ago, states he was unaware  He will call her office to f/u and I will forward today's note to her  He is aware of s/s that would prompt immediate eval in the ER at [facility]     Plan:     Reviewed lumbar MRI  Reviewed lumbar f/e films  Reviewed EDX  Reviewed Dr. Tanner Dose note  Rt shoulder xray reviewed as above  Urine screen deferred  OARRS report reviewed  Failed pregabalin 150 mg BD due to feeling angry  RF pregabalin 150 mg at HS, 2 RF  RF Diclofenac gel, 2 RF  Completed 4 sessions of PT, continues with active HEP  C7-T1 VERNA #1 - r/b and procedure discussed, denied until he completes PT, not needed at this time  B/l L5 TFESI #1 - r/b and procedure discussed  Patient encouraged to stay active   Treatment plan discussed with the patient including medication and procedure side effects     Cc:  Referring physician    M. Marvie Mcardle.

## 2022-11-14 DIAGNOSIS — M48.02 CERVICAL STENOSIS OF SPINE: ICD-10-CM

## 2022-11-14 DIAGNOSIS — M54.41 CHRONIC BILATERAL LOW BACK PAIN WITH BILATERAL SCIATICA: ICD-10-CM

## 2022-11-14 DIAGNOSIS — M54.42 CHRONIC BILATERAL LOW BACK PAIN WITH BILATERAL SCIATICA: ICD-10-CM

## 2022-11-14 DIAGNOSIS — M48.062 SPINAL STENOSIS OF LUMBAR REGION WITH NEUROGENIC CLAUDICATION: ICD-10-CM

## 2022-11-14 DIAGNOSIS — G89.4 CHRONIC PAIN SYNDROME: ICD-10-CM

## 2022-11-14 DIAGNOSIS — M54.16 LUMBAR RADICULOPATHY: ICD-10-CM

## 2022-11-14 DIAGNOSIS — M50.90 CERVICAL DISC DISORDER: ICD-10-CM

## 2022-11-14 DIAGNOSIS — M54.12 CERVICAL RADICULOPATHY: ICD-10-CM

## 2022-11-14 DIAGNOSIS — G89.29 CHRONIC BILATERAL LOW BACK PAIN WITH BILATERAL SCIATICA: ICD-10-CM

## 2022-11-14 DIAGNOSIS — M25.511 ACUTE PAIN OF RIGHT SHOULDER: ICD-10-CM

## 2022-11-14 RX ORDER — PREGABALIN 150 MG/1
CAPSULE ORAL
Qty: 30 CAPSULE | OUTPATIENT
Start: 2022-11-14

## 2022-11-15 ENCOUNTER — TELEPHONE (OUTPATIENT)
Dept: PAIN MANAGEMENT | Age: 57
End: 2022-11-15

## 2022-11-15 DIAGNOSIS — M25.511 ACUTE PAIN OF RIGHT SHOULDER: ICD-10-CM

## 2022-11-15 DIAGNOSIS — M50.90 CERVICAL DISC DISORDER: ICD-10-CM

## 2022-11-15 DIAGNOSIS — M48.02 CERVICAL STENOSIS OF SPINE: ICD-10-CM

## 2022-11-15 DIAGNOSIS — M54.41 CHRONIC BILATERAL LOW BACK PAIN WITH BILATERAL SCIATICA: ICD-10-CM

## 2022-11-15 DIAGNOSIS — M54.42 CHRONIC BILATERAL LOW BACK PAIN WITH BILATERAL SCIATICA: ICD-10-CM

## 2022-11-15 DIAGNOSIS — M54.12 CERVICAL RADICULOPATHY: ICD-10-CM

## 2022-11-15 DIAGNOSIS — M54.16 LUMBAR RADICULOPATHY: ICD-10-CM

## 2022-11-15 DIAGNOSIS — M48.062 SPINAL STENOSIS OF LUMBAR REGION WITH NEUROGENIC CLAUDICATION: ICD-10-CM

## 2022-11-15 DIAGNOSIS — G89.29 CHRONIC BILATERAL LOW BACK PAIN WITH BILATERAL SCIATICA: ICD-10-CM

## 2022-11-15 DIAGNOSIS — G89.4 CHRONIC PAIN SYNDROME: ICD-10-CM

## 2022-11-15 RX ORDER — PREGABALIN 150 MG/1
150 CAPSULE ORAL EVERY EVENING
Qty: 30 CAPSULE | Refills: 0 | Status: SHIPPED | OUTPATIENT
Start: 2022-11-15 | End: 2022-12-15

## 2022-11-15 NOTE — TELEPHONE ENCOUNTER
Escribed 30 day supply. Please schedule him a visit in the next month with Citlali Cline for further RF. Thanks.

## 2022-11-15 NOTE — TELEPHONE ENCOUNTER
Jb called in for a refill of Pregablin  Last refilled on 10-15-22  [place]irmer is consistent  Last office visit: 8-2-22  CX on 9-9- and 9-16-22  Future visit: None  Reason for refill:  Almost out of medication

## 2022-11-17 NOTE — TELEPHONE ENCOUNTER
Called wife to verify that appointment is 12-2-22 in [facility] at 215pm.

## 2022-12-02 ENCOUNTER — OFFICE VISIT (OUTPATIENT)
Dept: PAIN MANAGEMENT | Age: 57
End: 2022-12-02
Payer: COMMERCIAL

## 2022-12-02 VITALS
DIASTOLIC BLOOD PRESSURE: 76 MMHG | RESPIRATION RATE: 16 BRPM | WEIGHT: 187 LBS | SYSTOLIC BLOOD PRESSURE: 138 MMHG | BODY MASS INDEX: 31.16 KG/M2 | TEMPERATURE: 99 F | HEART RATE: 85 BPM | OXYGEN SATURATION: 95 % | HEIGHT: 65 IN

## 2022-12-02 DIAGNOSIS — G89.4 CHRONIC PAIN SYNDROME: ICD-10-CM

## 2022-12-02 DIAGNOSIS — M54.16 LUMBAR RADICULOPATHY: ICD-10-CM

## 2022-12-02 DIAGNOSIS — M50.90 CERVICAL DISC DISORDER: Primary | ICD-10-CM

## 2022-12-02 DIAGNOSIS — G89.29 CHRONIC PAIN OF LEFT KNEE: ICD-10-CM

## 2022-12-02 DIAGNOSIS — G89.29 CHRONIC BILATERAL LOW BACK PAIN WITH BILATERAL SCIATICA: ICD-10-CM

## 2022-12-02 DIAGNOSIS — M54.12 CERVICAL RADICULOPATHY: ICD-10-CM

## 2022-12-02 DIAGNOSIS — M25.511 ACUTE PAIN OF RIGHT SHOULDER: ICD-10-CM

## 2022-12-02 DIAGNOSIS — M48.062 SPINAL STENOSIS OF LUMBAR REGION WITH NEUROGENIC CLAUDICATION: ICD-10-CM

## 2022-12-02 DIAGNOSIS — M48.02 CERVICAL STENOSIS OF SPINE: ICD-10-CM

## 2022-12-02 DIAGNOSIS — M54.41 CHRONIC BILATERAL LOW BACK PAIN WITH BILATERAL SCIATICA: ICD-10-CM

## 2022-12-02 DIAGNOSIS — M19.011 PRIMARY OSTEOARTHRITIS OF RIGHT SHOULDER: ICD-10-CM

## 2022-12-02 DIAGNOSIS — M25.562 CHRONIC PAIN OF LEFT KNEE: ICD-10-CM

## 2022-12-02 DIAGNOSIS — M54.42 CHRONIC BILATERAL LOW BACK PAIN WITH BILATERAL SCIATICA: ICD-10-CM

## 2022-12-02 PROCEDURE — 99213 OFFICE O/P EST LOW 20 MIN: CPT | Performed by: PHYSICIAN ASSISTANT

## 2022-12-02 RX ORDER — PREGABALIN 150 MG/1
150 CAPSULE ORAL EVERY EVENING
Qty: 30 CAPSULE | Refills: 1 | Status: SHIPPED | OUTPATIENT
Start: 2022-12-02 | End: 2023-01-01

## 2022-12-02 RX ORDER — ATORVASTATIN CALCIUM 10 MG/1
TABLET, FILM COATED ORAL
COMMUNITY
Start: 2022-09-06

## 2022-12-02 NOTE — PROGRESS NOTES
Do you currently have any of the following:    Fever: No  Headache:  No  Cough: No  Shortness of breath: No  Exposed to anyone with these symptoms: No         Cristian Starr presents to the [address] [place] [address] on 12/2/2022. Pippa Nixon is complaining of pain in his low back. The pain is constant. The pain is described as aching, numb, and miserable. Pain is rated on his best day at a 5, on his worst day at a 10, and on average at a 7 on the VAS scale. He took his last dose of Lyrica yesterday. Any procedures since your last visit: No    Pacemaker or defibrillator: No    He is  on NSAIDS and is  on anticoagulation medications to include ASA and is managed by Floridalma Lamas DO  .     Medication Contract and Consent for Opioid Use Documents Filed        No documents found                    /76   Pulse 85   Temp 99 °F (37.2 °C) (Infrared)   Resp 16   Ht 5' 5\" (1.651 m)   Wt 187 lb (84.8 kg)   SpO2 95%   BMI 31.12 kg/m²      No LMP for male patient.

## 2022-12-02 NOTE — PROGRESS NOTES
[facility] Pain Management  [address]  [facility]    Follow up Note      Mark Giron     Date of Visit:  2022    CC:  Patient presents for follow up   Chief Complaint   Patient presents with    Follow-up     Low back pain        HPI:    Pain is unchanged. Left knee and right shoulder are very painful per patient. Appropriate analgesia with current medications regimen: yes - somewhat. .    Change in quality of symptoms:no. Medication side effects:none. Recent diagnostic testing:none. Recent interventional procedures:none. He has been on anticoagulation medications to include ASA and NSAIDS and has not been on herbal supplements. He is not diabetic. Imagin/2022 xray rt shoulder -  FINDINGS:   Three views of the right shoulder were obtained. There is no acute fracture   dislocation right shoulder. There are degenerative changes of the [facility] joint. The glenohumeral joint is intact and unremarkable. There is no fracture of   the right scapula. Impression   1. There is no fracture or dislocation of the right shoulder   2. Significant degenerative changes of the [facility] joint. 2022 xray lumbar f/e -      FINDINGS:   Anterolisthesis of L4 on L5 is 7 mm in flexion, 4 mm in the neutral position   and 1 mm in extension. There is mild multilevel degenerative disc disease. There is moderate degenerative facet arthropathy from L4-S1. Impression   Findings of spinal instability at L4-5 as noted. Degenerative lumbar   spondylosis. 2022 EDX -  Diagnostic Interpretation:   Normal emg of lower extremities; however, lumbar paraphasic potentials correlate with mri finding of severe stenosis such that patient is likely symptomatic from lumbar stenosis. Clinical correlation advised.         Technologist: Justice Lares MD     Nerve conduction studies and electromyography were performed according to our laboratory policies and procedures which can be provided upon request. All abnormal values are identified in the table. Laboratory normal values can also be provided upon request.        Cc: Roel Denson MD  [place] Nicole, DO     2022 lumbar MRI -  FINDINGS:   BONES/ALIGNMENT: The vertebral body heights appear maintained. There is   grade 1 anterolisthesis at L4-L5 with mild retrolisthesis at L5-S1. There is   loss of disc space height with Modic type 2 degenerative endplate changes at   Z1-M6. Minimal bone marrow edema is seen associated with L4-L5 facet   arthrosis bilaterally. Otherwise, the bone marrow signal demonstrates no   acute abnormality. SPINAL CORD: The conus terminates at a normal level. There is question of   clumping of the caudal quadrant nerve roots. SOFT TISSUES: No paraspinal mass identified. L1-L2: There is a left paracentral disc protrusion contacting the ventral   thecal sac. No significant spinal canal stenosis. No significant neural   foraminal narrowing. L2-L3: There is a disc bulge, which contacts the ventral thecal sac. Minimal   spinal canal stenosis. Facet arthrosis contributes to minimal right neural   foraminal narrowing. No significant left neural foraminal narrowing. L3-L4: There is a disc bulge with a left foraminal predominance indenting on   the ventral thecal sac. Mild-to-moderate spinal canal stenosis. Facet   arthrosis contributes to minimal bilateral neural foraminal narrowing. L4-L5: There is a disc bulge with buckling of the ligamentum flavum and facet   arthrosis. Severe spinal canal stenosis. Mild-to-moderate right and mild   left neural foraminal narrowing. L5-S1: There is a disc bulge with a central disc protrusion along with   bilateral facet arthrosis. Moderate to severe right and moderate left neural   foraminal narrowing. Impression   1.  Severe spinal canal stenosis at L4-L5 with mild-to-moderate stenosis at L3-L4. Minimal spinal canal stenosis at L2-L3. 2. Multilevel neural foraminal narrowing as above. 3. Grade 1 anterolisthesis at L4-L5 with mild retrolisthesis at L5-S1.   4. There is question of clumping of the cauda quadrant nerve roots, which can   be seen with prior arachnoiditis. 3/2022 MRI cervical -  RESULT:     Counting reference:  Craniocervical junction. Anatomic Variants:  None. Localizer images:  No additional findings. Alignment:    There is straightening of normal cervical lordosis. There   is very trace retrolisthesis of C5 on C6. Craniocervical junction:    Craniocervical junction is normal.     Cord: The visualized cord is within normal limits of signal intensity   and morphology. Bone marrow signal/fracture:    No evidence of pathologic marrow   infiltration. No evidence of prior fracture. There are Modic type I   degenerative endplate changes at M1-B9 with moderate disc space height   loss. There is congenital narrowing of the spinal canal.     Cervical soft tissues: The paraspinal soft tissues are within normal   limits. C2-C3: Canal and foramina are patent. C3-C4: Minor disc bulge is seen with mild narrowing of the spinal canal.     There is moderate left neural foraminal narrowing due to uncovertebral   arthropathy. There is no right neural foraminal narrowing. C4-C5: There is right central disc protrusion that mildly impresses on   the ventral cord and there is mild to moderate narrowing of the spinal   canal.  There is mild right neural foraminal narrowing due to   uncovertebral arthropathy. There is no left neural foraminal narrowing. C5-C6: There is disc osteophyte complex asymmetric to the left that   effaces the ventral CSF impinging on the ventral cord and causing   moderate stenosis of the spinal canal.  There is severe left neural   foraminal stenosis due to uncovertebral arthropathy and disc bulge.   The   right neural foramen is patent. C6-C7: Minor disc bulge and mild ligamentum flavum thickening is seen   with mild narrowing of the spinal canal.  There is mild right neural   foraminal narrowing due to uncovertebral arthropathy. There is no left   neural foraminal narrowing. C7-T1: There is moderate left neural foraminal narrowing due to   uncovertebral and facet joint arthropathy. There is minor disc bulge   without spinal canal or right neural foraminal narrowing. IMPRESSION   IMPRESSION:     1.  Multilevel degenerative cervical spondylosis. 2.  At C5-C6 there is moderate stenosis of the spinal canal with cord   impingement. There is also severe left neural foraminal stenosis. 3.  Congenital narrowing of the spinal canal.   4.  Modic type I degenerative endplate change at T5-K1. 5.  Level by level description as detailed. Anatomic Variant:  None. Assume 7 cervical vertebrae with counting from   the craniocervical junction. 2022 cervical xray -  FINDINGS:   No fracture or malalignment. No evidence of instability on flexion or   extension imaging. Mild narrowing of disc space at C5/C6. No prevertebral   soft tissue edema. Impression   1. No evidence of fracture or malalignment. 2. No evidence of instability on flexion or extension imaging. Potential Aberrant Drug-Related Behavior:       Urine Drug Screenin2022 consistent     OARRS report:  2022 to 2022 consistent         Opioid Agreement:  Renewal date: n/a    Past Medical History:   Diagnosis Date    Asthma     Bronchitis     COPD (chronic obstructive pulmonary disease) ([facility] [address]     Coronary artery disease     Hypertension        History reviewed. No pertinent surgical history. Prior to Admission medications    Medication Sig Start Date End Date Taking?  Authorizing Provider   atorvastatin (LIPITOR) 10 MG tablet TAKE 1 TABLET BY MOUTH AT BEDTIME 22  Yes Historical Provider, MD pregabalin (LYRICA) 150 MG capsule Take 1 capsule by mouth every evening for 30 days.  12/2/22 1/1/23 Yes SUZAN Pelayo   diclofenac sodium (VOLTAREN) 1 % GEL Apply 2 g topically 4 times daily 12/2/22  Yes SUZAN Pelayo   ibuprofen (ADVIL;MOTRIN) 800 MG tablet Take 800 mg by mouth three times daily   Yes Historical Provider, MD   cetirizine (ZYRTEC) 10 MG tablet Take 10 mg by mouth daily   Yes Historical Provider, MD   loratadine (CLARITIN) 10 MG capsule Take 10 mg by mouth daily   Yes Historical Provider, MD   albuterol sulfate  (90 Base) MCG/ACT inhaler INHALE 2 PUFFS BY MOUTH EVERY 4 HOURS AS NEEDED FOR SHORTNESS OF BREATH 12/24/21  Yes Historical Provider, MD   SYMBICORT 160-4.5 MCG/ACT AERO INHALE 2 PUFFS BY MOUTH TWICE DAILY 12/22/21  Yes Historical Provider, MD   ASPIRIN LOW DOSE 81 MG EC tablet  12/11/21  Yes Historical Provider, MD   amLODIPine (NORVASC) 5 MG tablet TAKE 1 TABLET BY MOUTH EVERY DAY FOR BLOOD PRESSURE 12/11/21  Yes Historical Provider, MD   losartan-hydroCHLOROthiazide (HYZAAR) 100-25 MG per tablet TAKE 1 TABLET BY MOUTH DAILY FOR BLOOD PRESSURE 11/29/21  Yes Historical Provider, MD   Multiple Vitamin (MULTIVITAMIN ADULT PO) Take by mouth daily   Yes Historical Provider, MD       No Known Allergies    Social History     Socioeconomic History    Marital status:      Spouse name: Not on file    Number of children: Not on file    Years of education: Not on file    Highest education level: Not on file   Occupational History    Not on file   Tobacco Use    Smoking status: Never    Smokeless tobacco: Never   Vaping Use    Vaping Use: Never used   Substance and Sexual Activity    Alcohol use: Never    Drug use: Never    Sexual activity: Not on file   Other Topics Concern    Not on file   Social History Narrative    Not on file     Social Determinants of Health     Financial Resource Strain: Not on file   Food Insecurity: Not on file   Transportation Needs: Not on file   Physical Activity: Not on file   Stress: Not on file   Social Connections: Not on file   Intimate Partner Violence: Not on file   Housing Stability: Not on file       Family History   Problem Relation Age of Onset    Arthritis Mother     Diabetes Mother     Hypertension Mother     Hypertension Father     Diabetes Brother        REVIEW OF SYSTEMS:     Jb denies fever/chills, chest pain, shortness of breath, new bowel or bladder complaints. All other review of systems was negative. PHYSICAL EXAMINATION:      /76   Pulse 85   Temp 99 °F (37.2 °C) (Infrared)   Resp 16   Ht 5' 5\" (1.651 m)   Wt 187 lb (84.8 kg)   SpO2 95%   BMI 31.12 kg/m²     General:      General appearance:   pleasant and well-hydrated. , in moderate discomfort and A & O x3  Build:Overweight    HEENT:    Head:normocephalic and atraumatic  Sclera: icterus absent,    Lungs:    Breathing:Normal expansion. No wheezing. Abdomen:    Shape:non-distended and normal    Lumbar spine:    Range of motion:abnormal moderately Lateral bending, flexion, extension rotation bilateral and is  painful. Extremities:    Tremors:None bilaterally upper and lower  Range of motion:Generally limited extension shoulder - right, pain with internal rotation of hips not done. Intact:Yes  Edema:Normal    Right shoulder with FF at 90 degrees. PROM at 160 degrees.   Negative drop arm exam.    Knee:    + medial joint line TTP  + Pain elicited on varus stress test  No effusion    Neurological:    [address]    Dermatology:    Skin:no unusual rashes, no skin lesions, no palpable subcutaneous nodules, and good skin turgor    Impression:    Cervical pain and left upper extremity radiculopathy in C5 and C6 distribution  3/2022 cervical MRI shows herniated disc at C5-C6 there is moderate stenosis of the spinal canal with cord impingement with severe left neural foraminal stenosis  PMHx: environ allergies, COPD/asthma, HTN, CAD  LBP L>R posterior LE pain  2022 lumbar MRI shows severe L4-5 stenosis  2022 lumbar f/e films shows instability at L4-5  2022 EDX BLE shows normal but does show changes consistent with severe L4-5 stenosis  Moved her from [facility] 12/2020 due to health concerns and [facility] does not had an adequate health care system  Has not worked since coming to the [address] [place] [address] [address] due to health issues  Has lumbar symptoms as well - had eval with Dr. Masha Alcaraz     He fell over the summer onto his rt shoulder, since that time has decreased shoulder function and pain, this is improved 60%  He is able to passively perform rt shoulder ROM but could not actively abduct thus it appears he has a RTC injury at the last visit, today he is able to do this although it is slow  He did not return to PT as he felt the PT was too much for him, he has continued with an active HEP  He continues to c/o decreased function to his right shoulder today. I suspect a rotator cuff pathology based on mechanism and exam.  MRI right shoulder ordered to further evaluated. From last visit: He describes some stool>incontinence when he gets severe \"electrical pain\", has happened approx 3 times and it last occurred 2 weeks ago, he states \"I'm not pooing on myself like a baby, only a little bit comes out\"  He was to f/u with Dr. Masha Alcaraz 4 weeks ago, states he was unaware  He will call her office to f/u and I will forward today's note to her  He is aware of s/s that would prompt immediate eval in the ER at [facility]     Plan:                             MRI left knee ordered today. Will need to obtain left knee x-ray from Dr. Millie Rainey office. S/p injury 1 year ago when he heard a pop. Continued focal pain to his medial joint      area. I suspect a left medial meniscal tear based on his exam and mechanism.     Urine screen deferred  OARRS report reviewed  Failed pregabalin 150 mg BD due to feeling angry  RF pregabalin 150 mg at HS  RF Diclofenac gel, 2 RF  Completed 4 sessions of PT, continues with active HEP  C7-T1 VERNA #1 - r/b and procedure discussed, denied until he completes PT, not needed at this time  B/l L5 TFESI #1 - apprehensive  Patient encouraged to stay active   Treatment plan discussed with the patient including medication and side effects                  Controlled Substance Monitoring:    Acute and Chronic Pain Monitoring:   RX Monitoring 12/2/2022   Periodic Controlled Substance Monitoring Possible medication side effects, risk of tolerance/dependence & alternative treatments discussed. ;No signs of potential drug abuse or diversion identified. ;Assessed functional status. ;Obtaining appropriate analgesic effect of treatment. We discussed with the patient that combining opioids, benzodiazepines, alcohol, illicit drugs or sleep aids increases the risk of respiratory depression including death. We discussed that these medications may cause drowsiness, sedation or dizziness and have counseled the patient not to drive or operate machinery. We have discussed that these medications will be prescribed only by one provider. We have discussed with the patient about age related risk factors and have thoroughly discussed the importance of taking these medications as prescribed. The patient verbalizes understanding.     ccreferring physic

## 2022-12-21 ENCOUNTER — HOSPITAL ENCOUNTER (OUTPATIENT)
Dept: MRI IMAGING | Age: 57
Discharge: HOME | End: 2022-12-23
Payer: COMMERCIAL

## 2022-12-21 DIAGNOSIS — M25.562 CHRONIC PAIN OF LEFT KNEE: ICD-10-CM

## 2022-12-21 DIAGNOSIS — M19.011 PRIMARY OSTEOARTHRITIS OF RIGHT SHOULDER: ICD-10-CM

## 2022-12-21 DIAGNOSIS — G89.29 CHRONIC PAIN OF LEFT KNEE: ICD-10-CM

## 2022-12-21 PROCEDURE — 73721 MRI JNT OF LWR EXTRE W/O DYE: CPT

## 2022-12-21 PROCEDURE — 73221 MRI JOINT UPR EXTREM W/O DYE: CPT

## 2023-01-03 DIAGNOSIS — S83.232A COMPLEX TEAR OF MEDIAL MENISCUS OF LEFT KNEE AS CURRENT INJURY, INITIAL ENCOUNTER: Primary | ICD-10-CM

## 2023-01-03 NOTE — PROGRESS NOTES
Called patient to review MRI of left knee and right shoulder. Referral to Dr. Zhen Thorne. Will schedule for CSIs in the interim due to increased pain.

## 2023-01-04 ENCOUNTER — TELEPHONE (OUTPATIENT)
Dept: PAIN MANAGEMENT | Age: 58
End: 2023-01-04

## 2023-01-04 NOTE — TELEPHONE ENCOUNTER
PA initiated for shoulder and knee injection through [employer] [facility] [employer] [facility] on 1-4-23.

## 2023-01-24 ENCOUNTER — OFFICE VISIT (OUTPATIENT)
Dept: PAIN MANAGEMENT | Age: 58
End: 2023-01-24
Payer: COMMERCIAL

## 2023-01-24 VITALS
DIASTOLIC BLOOD PRESSURE: 73 MMHG | TEMPERATURE: 99.3 F | WEIGHT: 187 LBS | SYSTOLIC BLOOD PRESSURE: 130 MMHG | HEIGHT: 65 IN | HEART RATE: 88 BPM | BODY MASS INDEX: 31.16 KG/M2 | OXYGEN SATURATION: 98 % | RESPIRATION RATE: 18 BRPM

## 2023-01-24 DIAGNOSIS — M54.12 CERVICAL RADICULOPATHY: ICD-10-CM

## 2023-01-24 DIAGNOSIS — G89.4 CHRONIC PAIN SYNDROME: Primary | ICD-10-CM

## 2023-01-24 DIAGNOSIS — M25.562 CHRONIC PAIN OF LEFT KNEE: ICD-10-CM

## 2023-01-24 DIAGNOSIS — M48.062 SPINAL STENOSIS OF LUMBAR REGION WITH NEUROGENIC CLAUDICATION: ICD-10-CM

## 2023-01-24 DIAGNOSIS — G89.29 CHRONIC PAIN OF LEFT KNEE: ICD-10-CM

## 2023-01-24 DIAGNOSIS — M48.02 CERVICAL STENOSIS OF SPINE: ICD-10-CM

## 2023-01-24 DIAGNOSIS — M54.42 CHRONIC BILATERAL LOW BACK PAIN WITH BILATERAL SCIATICA: ICD-10-CM

## 2023-01-24 DIAGNOSIS — M50.90 CERVICAL DISC DISORDER: ICD-10-CM

## 2023-01-24 DIAGNOSIS — M54.16 LUMBAR RADICULOPATHY: ICD-10-CM

## 2023-01-24 DIAGNOSIS — G89.29 CHRONIC BILATERAL LOW BACK PAIN WITH BILATERAL SCIATICA: ICD-10-CM

## 2023-01-24 DIAGNOSIS — M19.011 PRIMARY OSTEOARTHRITIS OF RIGHT SHOULDER: ICD-10-CM

## 2023-01-24 DIAGNOSIS — S83.232A COMPLEX TEAR OF MEDIAL MENISCUS OF LEFT KNEE AS CURRENT INJURY, INITIAL ENCOUNTER: ICD-10-CM

## 2023-01-24 DIAGNOSIS — M25.511 ACUTE PAIN OF RIGHT SHOULDER: ICD-10-CM

## 2023-01-24 DIAGNOSIS — M54.41 CHRONIC BILATERAL LOW BACK PAIN WITH BILATERAL SCIATICA: ICD-10-CM

## 2023-01-24 PROCEDURE — 99213 OFFICE O/P EST LOW 20 MIN: CPT | Performed by: PAIN MEDICINE

## 2023-01-24 PROCEDURE — 3017F COLORECTAL CA SCREEN DOC REV: CPT | Performed by: PAIN MEDICINE

## 2023-01-24 PROCEDURE — G8417 CALC BMI ABV UP PARAM F/U: HCPCS | Performed by: PAIN MEDICINE

## 2023-01-24 PROCEDURE — 3079F DIAST BP 80-89 MM HG: CPT | Performed by: PAIN MEDICINE

## 2023-01-24 PROCEDURE — 1036F TOBACCO NON-USER: CPT | Performed by: PAIN MEDICINE

## 2023-01-24 PROCEDURE — G8484 FLU IMMUNIZE NO ADMIN: HCPCS | Performed by: PAIN MEDICINE

## 2023-01-24 PROCEDURE — G8427 DOCREV CUR MEDS BY ELIG CLIN: HCPCS | Performed by: PAIN MEDICINE

## 2023-01-24 PROCEDURE — 99214 OFFICE O/P EST MOD 30 MIN: CPT | Performed by: PAIN MEDICINE

## 2023-01-24 PROCEDURE — 3075F SYST BP GE 130 - 139MM HG: CPT | Performed by: PAIN MEDICINE

## 2023-01-24 RX ORDER — HYDROCODONE BITARTRATE AND ACETAMINOPHEN 5; 325 MG/1; MG/1
1 TABLET ORAL NIGHTLY PRN
Qty: 30 TABLET | Refills: 0 | Status: SHIPPED | OUTPATIENT
Start: 2023-01-24 | End: 2023-02-23

## 2023-01-24 NOTE — PROGRESS NOTES
Do you currently have any of the following:    Fever: No  Headache:  No  Cough: No  Shortness of breath: No  Exposed to anyone with these symptoms: No         Jannet De Jesus presents to the [facility] on 1/24/2023. Luann Albert is complaining of pain right shoulder pain. The pain is constant. The pain is described as aching, burning, and miserable. Pain is rated on his best day at a 7, on his worst day at a 10, and on average at a 8 on the VAS scale. He took his last dose of Lyrica every night . Any procedures since your last visit: No    Pacemaker or defibrillator: No     He is not on NSAIDS and is not on anticoagulation medications   Medication Contract and Consent for Opioid Use Documents Filed        No documents found                    /73   Pulse 88   Temp 99.3 °F (37.4 °C) (Temporal)   Resp 18   Ht 5' 5\" (1.651 m)   Wt 187 lb (84.8 kg)   SpO2 98%   BMI 31.12 kg/m²      No LMP for male patient.

## 2023-01-24 NOTE — PROGRESS NOTES
[facility] Pain Management  [address]  [facility]    Follow up Note      Jeninfer Power     Date of Visit:  2023    CC:  Patient presents for follow up   Chief Complaint   Patient presents with    Follow-up     Right shoulder     HPI:    Pain is unchanged. Left knee and right shoulder are very painful per patient. Change in quality of symptoms:no. Medication side effects:none. Recent diagnostic testing:L knee MRI, R shoulder MRI  Recent interventional procedures:none. He has been on anticoagulation medications to include ASA and NSAIDS and has not been on herbal supplements. He is not diabetic. Imagin/2022 MRI L knee -  Complex multidirectional medial meniscal tear. Intra-articular suprapatellar mass, most likely reflecting focal synovitis. This measures 3.1 x 0.9 x 2.6 cm. Mild-to-moderate medial compartment cartilage degeneration. Focal cartilage fissuring along the medial trochlear facet with minimal   subchondral edema. 2022 MRI R shoulder -  1. Large full-thickness supraspinatus and infraspinatus rotator cuff tear   measuring 4.7 x 4.6 cm. Significant myotendinous junction retraction. Moderate infraspinatus atrophy and fatty infiltration. Mild-to-moderate   supraspinatus atrophy without fatty infiltration. 2. Moderate-sized distal subscapularis full-thickness tear. No significant   retraction or atrophy. 3. Medial dislocation and partial-thickness tearing of the long head biceps   tendon. 4. Mild glenohumeral osteoarthritis. Mild AC degenerative change. 2022 xray rt shoulder -  FINDINGS:   Three views of the right shoulder were obtained. There is no acute fracture   dislocation right shoulder. There are degenerative changes of the [facility] joint. The glenohumeral joint is intact and unremarkable. There is no fracture of   the right scapula. Impression   1.  There is no fracture or dislocation of the right shoulder   2. Significant degenerative changes of the [facility] [place] joint. 6/2022 xray lumbar f/e -      FINDINGS:   Anterolisthesis of L4 on L5 is 7 mm in flexion, 4 mm in the neutral position   and 1 mm in extension. There is mild multilevel degenerative disc disease. There is moderate degenerative facet arthropathy from L4-S1. Impression   Findings of spinal instability at L4-5 as noted. Degenerative lumbar   spondylosis. 7/2022 EDX -  Diagnostic Interpretation:   Normal emg of lower extremities; however, lumbar paraphasic potentials correlate with mri finding of severe stenosis such that patient is likely symptomatic from lumbar stenosis. Clinical correlation advised. Technologist: Mario Álvarez MD     Nerve conduction studies and electromyography were performed according to our laboratory policies and procedures which can be provided upon request. All abnormal values are identified in the table. Laboratory normal values can also be provided upon request.        Cc: Jody San MD  [facility], DO     2022 lumbar MRI -  FINDINGS:   BONES/ALIGNMENT: The vertebral body heights appear maintained. There is   grade 1 anterolisthesis at L4-L5 with mild retrolisthesis at L5-S1. There is   loss of disc space height with Modic type 2 degenerative endplate changes at   B0-I0. Minimal bone marrow edema is seen associated with L4-L5 facet   arthrosis bilaterally. Otherwise, the bone marrow signal demonstrates no   acute abnormality. SPINAL CORD: The conus terminates at a normal level. There is question of   clumping of the caudal quadrant nerve roots. SOFT TISSUES: No paraspinal mass identified. L1-L2: There is a left paracentral disc protrusion contacting the ventral   thecal sac. No significant spinal canal stenosis. No significant neural   foraminal narrowing. L2-L3: There is a disc bulge, which contacts the ventral thecal sac. Minimal   spinal canal stenosis. Facet arthrosis contributes to minimal right neural   foraminal narrowing. No significant left neural foraminal narrowing. L3-L4: There is a disc bulge with a left foraminal predominance indenting on   the ventral thecal sac. Mild-to-moderate spinal canal stenosis. Facet   arthrosis contributes to minimal bilateral neural foraminal narrowing. L4-L5: There is a disc bulge with buckling of the ligamentum flavum and facet   arthrosis. Severe spinal canal stenosis. Mild-to-moderate right and mild   left neural foraminal narrowing. L5-S1: There is a disc bulge with a central disc protrusion along with   bilateral facet arthrosis. Moderate to severe right and moderate left neural   foraminal narrowing. Impression   1. Severe spinal canal stenosis at L4-L5 with mild-to-moderate stenosis at   L3-L4. Minimal spinal canal stenosis at L2-L3. 2. Multilevel neural foraminal narrowing as above. 3. Grade 1 anterolisthesis at L4-L5 with mild retrolisthesis at L5-S1.   4. There is question of clumping of the cauda quadrant nerve roots, which can   be seen with prior arachnoiditis. 3/2022 MRI cervical -  RESULT:     Counting reference:  Craniocervical junction. Anatomic Variants:  None. Localizer images:  No additional findings. Alignment:    There is straightening of normal cervical lordosis. There   is very trace retrolisthesis of C5 on C6. Craniocervical junction:    Craniocervical junction is normal.     Cord: The visualized cord is within normal limits of signal intensity   and morphology. Bone marrow signal/fracture:    No evidence of pathologic marrow   infiltration. No evidence of prior fracture. There are Modic type I   degenerative endplate changes at X6-O2 with moderate disc space height   loss. There is congenital narrowing of the spinal canal.     Cervical soft tissues:     The paraspinal soft tissues are within normal limits. C2-C3: Canal and foramina are patent. C3-C4: Minor disc bulge is seen with mild narrowing of the spinal canal.     There is moderate left neural foraminal narrowing due to uncovertebral   arthropathy. There is no right neural foraminal narrowing. C4-C5: There is right central disc protrusion that mildly impresses on   the ventral cord and there is mild to moderate narrowing of the spinal   canal.  There is mild right neural foraminal narrowing due to   uncovertebral arthropathy. There is no left neural foraminal narrowing. C5-C6: There is disc osteophyte complex asymmetric to the left that   effaces the ventral CSF impinging on the ventral cord and causing   moderate stenosis of the spinal canal.  There is severe left neural   foraminal stenosis due to uncovertebral arthropathy and disc bulge. The   right neural foramen is patent. C6-C7: Minor disc bulge and mild ligamentum flavum thickening is seen   with mild narrowing of the spinal canal.  There is mild right neural   foraminal narrowing due to uncovertebral arthropathy. There is no left   neural foraminal narrowing. C7-T1: There is moderate left neural foraminal narrowing due to   uncovertebral and facet joint arthropathy. There is minor disc bulge   without spinal canal or right neural foraminal narrowing. IMPRESSION   IMPRESSION:     1.  Multilevel degenerative cervical spondylosis. 2.  At C5-C6 there is moderate stenosis of the spinal canal with cord   impingement. There is also severe left neural foraminal stenosis. 3.  Congenital narrowing of the spinal canal.   4.  Modic type I degenerative endplate change at V6-Y4. 5.  Level by level description as detailed. Anatomic Variant:  None. Assume 7 cervical vertebrae with counting from   the craniocervical junction. 4/2022 cervical xray -  FINDINGS:   No fracture or malalignment. No evidence of instability on flexion or   extension imaging.   Mild narrowing of disc space at C5/C6. No prevertebral   soft tissue edema. Impression   1. No evidence of fracture or malalignment. 2. No evidence of instability on flexion or extension imaging. Potential Aberrant Drug-Related Behavior:       Urine Drug Screenin2022 consistent     OARRS report:  2022 to 2023 consistent     Opioid Agreement:  Renewal date: n/a    Past Medical History:   Diagnosis Date    Asthma     Bronchitis     COPD (chronic obstructive pulmonary disease) ([facility] [address]     Coronary artery disease     Hypertension        History reviewed. No pertinent surgical history. Prior to Admission medications    Medication Sig Start Date End Date Taking?  Authorizing Provider   atorvastatin (LIPITOR) 10 MG tablet TAKE 1 TABLET BY MOUTH AT BEDTIME 22  Yes Historical Provider, MD   diclofenac sodium (VOLTAREN) 1 % GEL Apply 2 g topically 4 times daily 22  Yes SUZAN Mccord   ibuprofen (ADVIL;MOTRIN) 800 MG tablet Take 800 mg by mouth three times daily   Yes Historical Provider, MD   cetirizine (ZYRTEC) 10 MG tablet Take 10 mg by mouth daily   Yes Historical Provider, MD   loratadine (CLARITIN) 10 MG capsule Take 10 mg by mouth daily   Yes Historical Provider, MD   albuterol sulfate  (90 Base) MCG/ACT inhaler INHALE 2 PUFFS BY MOUTH EVERY 4 HOURS AS NEEDED FOR SHORTNESS OF BREATH 21  Yes Historical Provider, MD   SYMBICORT 160-4.5 MCG/ACT AERO INHALE 2 PUFFS BY MOUTH TWICE DAILY 21  Yes Historical Provider, MD   ASPIRIN LOW DOSE 81 MG EC tablet  21  Yes Historical Provider, MD   amLODIPine (NORVASC) 5 MG tablet TAKE 1 TABLET BY MOUTH EVERY DAY FOR BLOOD PRESSURE 21  Yes Historical Provider, MD   losartan-hydroCHLOROthiazide (HYZAAR) 100-25 MG per tablet TAKE 1 TABLET BY MOUTH DAILY FOR BLOOD PRESSURE 21  Yes Historical Provider, MD   Multiple Vitamin (MULTIVITAMIN ADULT PO) Take by mouth daily   Yes Historical Provider, MD   pregabalin (LYRICA) 150 MG capsule Take 1 capsule by mouth every evening for 30 days. 12/2/22 1/1/23  SUZAN Cohn       No Known Allergies    Social History     Socioeconomic History    Marital status:      Spouse name: Not on file    Number of children: Not on file    Years of education: Not on file    Highest education level: Not on file   Occupational History    Not on file   Tobacco Use    Smoking status: Never    Smokeless tobacco: Never   Vaping Use    Vaping Use: Never used   Substance and Sexual Activity    Alcohol use: Never    Drug use: Never    Sexual activity: Not on file   Other Topics Concern    Not on file   Social History Narrative    Not on file     Social Determinants of Health     Financial Resource Strain: Not on file   Food Insecurity: Not on file   Transportation Needs: Not on file   Physical Activity: Not on file   Stress: Not on file   Social Connections: Not on file   Intimate Partner Violence: Not on file   Housing Stability: Not on file       Family History   Problem Relation Age of Onset    Arthritis Mother     Diabetes Mother     Hypertension Mother     Hypertension Father     Diabetes Brother        REVIEW OF SYSTEMS:     Jb denies fever/chills, chest pain, shortness of breath, new bowel or bladder complaints. All other review of systems was negative.    PHYSICAL EXAMINATION:      /73   Pulse 88   Temp 99.3 °F (37.4 °C) (Temporal)   Resp 18   Ht 5' 5\" (1.651 m)   Wt 187 lb (84.8 kg)   SpO2 98%   BMI 31.12 kg/m²     General:      General appearance: pleasant and well-hydrated, in moderate discomfort and A & O x3  Build:Overweight    HEENT:    Head:normocephalic and atraumatic  Sclera: icterus absent    Lungs:    Breathing:Normal expansion.  No audible wheezing.    Abdomen:    Shape:non-distended and normal    Lumbar spine:    Range of motion:abnormal moderately in Lateral bending, flexion, extension rotation bilateral and is   painful. Extremities:    Tremors:None bilaterally upper and lower  Range of motion:Generally limited extension shoulder - right, pain with internal rotation of hips not done. Intact:Yes  Edema:Normal    Right shoulder with FF at 90 degrees. PROM at 160 degrees. Negative drop arm exam.    Knee:    + medial joint line TTP  + Pain elicited on varus stress test  No effusion    Neurological:    [address]    Dermatology:    Skin:no unusual rashes, no skin lesions    Impression:    Cervical pain and left upper extremity radiculopathy in C5 and C6 distribution  3/2022 cervical MRI shows herniated disc at C5-C6 there is moderate stenosis of the spinal canal with cord impingement with severe left neural foraminal stenosis  PMHx: environ allergies, COPD/asthma, HTN, CAD  LBP L>R posterior LE pain  2022 lumbar MRI shows severe L4-5 stenosis  2022 lumbar f/e films shows instability at L4-5  2022 EDX BLE shows normal but does show changes consistent with severe L4-5 stenosis  Moved her from [facility] 12/2020 due to health concerns and [facility] does not had an adequate health care system  Has not worked since coming to the [address] due to health issues  Has lumbar symptoms as well - had eval with Dr. Adolph Jain     He fell over the summer onto his rt shoulder, since that time has decreased shoulder function and pain, continues to be severe  He is able to passively perform rt shoulder ROM but could not actively abduct thus it appears he has a RTC injury at the last visit, today he is able to do this although it is slow  He did not return to PT as he felt the PT was too much for him, he has continued with an active HEP  He continues to c/o decreased function to his right shoulder today.    MRI rt shoulder reviewed and shows significant pathology  He has upcoming appt with ortho to discuss  This pain is greatly affecting his sleep  Patient requests medication to treat this pain and allow him to sleep     From last visit: He describes some stool>incontinence when he gets severe \"electrical pain\", has happened approx 3 times and it last occurred 2 weeks ago, he states \"I'm not pooing on myself like a baby, only a little bit comes out\"  He was to f/u with Dr. Gaby Nowak, states he was unaware  He will call her office to f/u and I will forwarded last visit's note to her  He is aware of s/s that would prompt immediate eval in the ER at [facility]       Plan:     Juan M Minems agreement to be completed at checkout today                          MRI left knee and rt shoulder reviewed today, has appt with Dr. Kohler to discuss    Rt shoulder injection - schedule, r/b and procedure discussed  Urine screen deferred  OARRS report reviewed  Failed pregabalin 150 mg BD due to feeling angry  stop pregabalin 150 mg at HS - not helpful  stop Diclofenac gel, 2 RF - not helpful  Start Norco 5/325 #30  Completed 4 sessions of PT, continues with active HEP  C7-T1 VERNA #1 - r/b and procedure discussed, denied until he completes PT, not needed at this time  B/l L5 TFESI #1 - apprehensive  Patient encouraged to stay active   Treatment plan discussed with the patient including medication and side effects     We discussed with the patient that combining opioids, benzodiazepines, alcohol, illicit drugs or sleep aids increases the risk of respiratory depression including death. We discussed that these medications may cause drowsiness, sedation or dizziness and have counseled the patient not to drive or operate machinery. We have discussed that these medications will be prescribed only by one provider. We have discussed with the patient about age related risk factors and have thoroughly discussed the importance of taking these medications as prescribed. The patient verbalizes understanding.

## 2023-01-25 ENCOUNTER — PROCEDURE VISIT (OUTPATIENT)
Dept: PAIN MANAGEMENT | Age: 58
End: 2023-01-25
Payer: COMMERCIAL

## 2023-01-25 VITALS
HEIGHT: 65 IN | BODY MASS INDEX: 31.16 KG/M2 | DIASTOLIC BLOOD PRESSURE: 85 MMHG | OXYGEN SATURATION: 95 % | HEART RATE: 76 BPM | SYSTOLIC BLOOD PRESSURE: 135 MMHG | WEIGHT: 187 LBS | RESPIRATION RATE: 16 BRPM | TEMPERATURE: 99.5 F

## 2023-01-25 DIAGNOSIS — M25.511 ACUTE PAIN OF RIGHT SHOULDER: Primary | ICD-10-CM

## 2023-01-25 PROCEDURE — 99213 OFFICE O/P EST LOW 20 MIN: CPT | Performed by: PAIN MEDICINE

## 2023-01-25 PROCEDURE — 20610 DRAIN/INJ JOINT/BURSA W/O US: CPT | Performed by: PAIN MEDICINE

## 2023-01-25 RX ORDER — METHYLPREDNISOLONE ACETATE 40 MG/ML
40 INJECTION, SUSPENSION INTRA-ARTICULAR; INTRALESIONAL; INTRAMUSCULAR; SOFT TISSUE ONCE
Status: COMPLETED | OUTPATIENT
Start: 2023-01-25 | End: 2023-01-25

## 2023-01-25 RX ORDER — MONTELUKAST SODIUM 10 MG/1
TABLET ORAL
COMMUNITY
Start: 2022-12-30

## 2023-01-25 RX ORDER — BUPIVACAINE HYDROCHLORIDE 2.5 MG/ML
3 INJECTION, SOLUTION EPIDURAL; INFILTRATION; INTRACAUDAL ONCE
Status: COMPLETED | OUTPATIENT
Start: 2023-01-25 | End: 2023-01-25

## 2023-01-25 RX ADMIN — METHYLPREDNISOLONE ACETATE 40 MG: 40 INJECTION, SUSPENSION INTRA-ARTICULAR; INTRALESIONAL; INTRAMUSCULAR; SOFT TISSUE at 12:00

## 2023-01-25 RX ADMIN — BUPIVACAINE HYDROCHLORIDE 7.5 MG: 2.5 INJECTION, SOLUTION EPIDURAL; INFILTRATION; INTRACAUDAL at 11:59

## 2023-01-25 NOTE — PROGRESS NOTES
2023    Patient: Ping Newsome  :  1965  Age:  56 y.o. Sex:  male     PRE-OPERATIVE DIAGNOSIS: Right   Shoulder pain, osteoarthritis. POST-OPERATIVE DIAGNOSIS: Same. PROCEDURE PERFORMED: Right  Shoulder steroid injection. SURGEON:   KIMANI Luong. ANESTHESIA: local    ESTIMATED BLOOD LOSS: None. BRIEF HISTORY: Ping Newsome comes in today for Right  Shoulder steroid injection. After discussing the potential risks and benefits of the procedure with the patient. Jb did request that we proceed. A complete History & Physical was reviewed and it is unchanged. DESCRIPTION OF PROCEDURE:   The patient was placed in a seated position. The area of the Right  shoulder was prepped with chloraprep and draped in a sterile manner. The overlying skin and subcutaneous tissues were anesthetized with ethyl chloride spray then re-sterilized with alcohol wipes. Using the posterior approach, a 25 gauge 1 1/2 inch  needle was advanced  In anterolateral projection into the joint capsule. After negative aspiration a solution of 0.25 % marcaine 3 cc and 40 mg DepoMedrol was injected easily without complications. The needle was then removed and Band-Aid applied. Disposition the patient tolerated the procedure well and there were no complications . Kilo Wallace will follow up in our [address] as scheduled. He was encouraged to call with questions, concerns or if worsening of symptoms occurs.     Jeff Us DO

## 2023-01-25 NOTE — PROGRESS NOTES
1/25/2023    Chief Complaint   Patient presents with    Injections     Right shoulder injection       Allergies as of 01/25/2023    (No Known Allergies)        Procedure: Shoulder injection     Y consent signed Y procedure verified with patient  Y allergies noted N/A pt confirms not pregnant    Patient rates pain a 7/10 on the VAS scale.    Skin Prep:  ChloraPrep    Anesthetic:  n/a    Medication:  Marcaine 0.25% and DepMedrol    Vitals:    01/25/23 1108   BP: 135/85   Pulse: 76   Resp: 16   Temp: 99.5 °F (37.5 °C)   TempSrc: Infrared   SpO2: 95%   Weight: 187 lb (84.8 kg)   Height: 5' 5\" (1.651 m)       I witnessed patient signing consent to Medical Procedure and Treatment form.     Rich Luther RN

## 2023-02-08 ENCOUNTER — PROCEDURE VISIT (OUTPATIENT)
Dept: PAIN MANAGEMENT | Age: 58
End: 2023-02-08
Payer: COMMERCIAL

## 2023-02-08 ENCOUNTER — TELEPHONE (OUTPATIENT)
Dept: PAIN MANAGEMENT | Age: 58
End: 2023-02-08

## 2023-02-08 VITALS
HEART RATE: 77 BPM | OXYGEN SATURATION: 98 % | TEMPERATURE: 96.8 F | RESPIRATION RATE: 18 BRPM | SYSTOLIC BLOOD PRESSURE: 112 MMHG | BODY MASS INDEX: 31.49 KG/M2 | WEIGHT: 189 LBS | HEIGHT: 65 IN | DIASTOLIC BLOOD PRESSURE: 69 MMHG

## 2023-02-08 DIAGNOSIS — M54.42 CHRONIC BILATERAL LOW BACK PAIN WITH BILATERAL SCIATICA: ICD-10-CM

## 2023-02-08 DIAGNOSIS — M54.41 CHRONIC BILATERAL LOW BACK PAIN WITH BILATERAL SCIATICA: ICD-10-CM

## 2023-02-08 DIAGNOSIS — G89.29 CHRONIC PAIN OF LEFT KNEE: Primary | ICD-10-CM

## 2023-02-08 DIAGNOSIS — G89.29 CHRONIC PAIN OF LEFT KNEE: ICD-10-CM

## 2023-02-08 DIAGNOSIS — M54.12 CERVICAL RADICULOPATHY: ICD-10-CM

## 2023-02-08 DIAGNOSIS — M25.562 CHRONIC PAIN OF LEFT KNEE: ICD-10-CM

## 2023-02-08 DIAGNOSIS — M54.16 LUMBAR RADICULOPATHY: ICD-10-CM

## 2023-02-08 DIAGNOSIS — M48.062 SPINAL STENOSIS OF LUMBAR REGION WITH NEUROGENIC CLAUDICATION: ICD-10-CM

## 2023-02-08 DIAGNOSIS — M48.02 CERVICAL STENOSIS OF SPINE: ICD-10-CM

## 2023-02-08 DIAGNOSIS — M50.90 CERVICAL DISC DISORDER: ICD-10-CM

## 2023-02-08 DIAGNOSIS — M19.011 PRIMARY OSTEOARTHRITIS OF RIGHT SHOULDER: ICD-10-CM

## 2023-02-08 DIAGNOSIS — G89.4 CHRONIC PAIN SYNDROME: ICD-10-CM

## 2023-02-08 DIAGNOSIS — S83.232A COMPLEX TEAR OF MEDIAL MENISCUS OF LEFT KNEE AS CURRENT INJURY, INITIAL ENCOUNTER: ICD-10-CM

## 2023-02-08 DIAGNOSIS — M25.562 CHRONIC PAIN OF LEFT KNEE: Primary | ICD-10-CM

## 2023-02-08 DIAGNOSIS — M25.511 ACUTE PAIN OF RIGHT SHOULDER: ICD-10-CM

## 2023-02-08 DIAGNOSIS — G89.29 CHRONIC BILATERAL LOW BACK PAIN WITH BILATERAL SCIATICA: ICD-10-CM

## 2023-02-08 PROCEDURE — 20610 DRAIN/INJ JOINT/BURSA W/O US: CPT | Performed by: PAIN MEDICINE

## 2023-02-08 RX ORDER — METHYLPREDNISOLONE ACETATE 40 MG/ML
40 INJECTION, SUSPENSION INTRA-ARTICULAR; INTRALESIONAL; INTRAMUSCULAR; SOFT TISSUE ONCE
Status: COMPLETED | OUTPATIENT
Start: 2023-02-08 | End: 2023-02-08

## 2023-02-08 RX ORDER — HYDROCODONE BITARTRATE AND ACETAMINOPHEN 5; 325 MG/1; MG/1
1 TABLET ORAL NIGHTLY PRN
Qty: 23 TABLET | Refills: 0 | Status: SHIPPED | OUTPATIENT
Start: 2023-02-08 | End: 2023-03-03

## 2023-02-08 RX ORDER — BUPIVACAINE HYDROCHLORIDE 2.5 MG/ML
2 INJECTION, SOLUTION EPIDURAL; INFILTRATION; INTRACAUDAL ONCE
Status: COMPLETED | OUTPATIENT
Start: 2023-02-08 | End: 2023-02-08

## 2023-02-08 RX ADMIN — METHYLPREDNISOLONE ACETATE 40 MG: 40 INJECTION, SUSPENSION INTRA-ARTICULAR; INTRALESIONAL; INTRAMUSCULAR; SOFT TISSUE at 16:52

## 2023-02-08 RX ADMIN — BUPIVACAINE HYDROCHLORIDE 5 MG: 2.5 INJECTION, SOLUTION EPIDURAL; INFILTRATION; INTRACAUDAL at 16:51

## 2023-02-08 NOTE — PROGRESS NOTES
2023    Patient: Myles Candelario  :  1965  Age:  56 y.o. Sex:  male     PRE-OPERATIVE DIAGNOSIS:Left   Knee pain, osteoarthitis. POST-OPERATIVE DIAGNOSIS: Same. PROCEDURE PERFORMED: Left knee steroid injection. SURGEON:   KIMANI Torres. ANESTHESIA: local with ethyl chloride spray    ESTIMATED BLOOD LOSS: None. BRIEF HISTORY: Myles Candelario comes in today for Left Knee steroid injection. After discussing the potential risks and benefits of the procedure with the patient. Jb did request that we proceed. A complete History & Physical was reviewed and it is unchanged. DESCRIPTION OF PROCEDURE:   The patient was placed in a seated position. Then the targeted area lateral to the Left patellar tendon was palpated and marked and was sprayed with ethyl chloride spray. The area of the Left knee was prepped with chloraprep and draped in a sterile manner. A 25 gauge 1 1/2 inch  needle was advanced into the joint capsule and 2 cc's of 0.25% bupivacaine and 40 mg of DepoMedrol was injected easily without complications. The needle was then removed and Band-Aid applied. Disposition the patient tolerated the procedure well and there were no complications . Rosario Stone will follow up in our [address] as scheduled. He was encouraged to call with questions, concerns or if worsening of symptoms occurs.     Katherine Espinoza DO

## 2023-02-08 NOTE — TELEPHONE ENCOUNTER
I called the pharmacy about the [facility], they stated that the initial Rx only gets filled for 7 days per the insurance company. A second Rx for the 23 days remaining will need a new Rx e-scribed. The first Rx was cancelled out per the pharmacy.

## 2023-02-08 NOTE — PROGRESS NOTES
2/8/2023    Chief Complaint   Patient presents with    Injections     Left knee steriod injection        Allergies as of 02/08/2023    (No Known Allergies)        Procedure: left knee cortisone steriod injection      y consent signed y procedure verified with patient  y allergies noted y pt confirms not pregnant    Patient rates pain a 8/10 on the VAS scale. Skin Prep:  ChloraPrep    Anesthetic:  n/a    Medication:  Marcaine 0.25%    Vitals:    02/08/23 1551   Resp: 18   Temp: 96.8 °F (36 °C)   TempSrc: Temporal   SpO2: 98%   Weight: 189 lb (85.7 kg)   Height: 5' 5\" (1.651 m)       I witnessed patient signing consent to Medical Procedure and Treatment form.      Keshav Blum

## 2023-02-18 SDOH — HEALTH STABILITY: PHYSICAL HEALTH: ON AVERAGE, HOW MANY DAYS PER WEEK DO YOU ENGAGE IN MODERATE TO STRENUOUS EXERCISE (LIKE A BRISK WALK)?: 0 DAYS

## 2023-02-18 ASSESSMENT — SOCIAL DETERMINANTS OF HEALTH (SDOH)
WITHIN THE LAST YEAR, HAVE YOU BEEN KICKED, HIT, SLAPPED, OR OTHERWISE PHYSICALLY HURT BY YOUR PARTNER OR EX-PARTNER?: NO
WITHIN THE LAST YEAR, HAVE YOU BEEN AFRAID OF YOUR PARTNER OR EX-PARTNER?: NO
WITHIN THE LAST YEAR, HAVE YOU BEEN HUMILIATED OR EMOTIONALLY ABUSED IN OTHER WAYS BY YOUR PARTNER OR EX-PARTNER?: NO
WITHIN THE LAST YEAR, HAVE TO BEEN RAPED OR FORCED TO HAVE ANY KIND OF SEXUAL ACTIVITY BY YOUR PARTNER OR EX-PARTNER?: NO

## 2023-02-20 ENCOUNTER — OFFICE VISIT (OUTPATIENT)
Dept: ORTHOPEDIC SURGERY | Age: 58
End: 2023-02-20

## 2023-02-20 VITALS — WEIGHT: 189 LBS | HEIGHT: 65 IN | BODY MASS INDEX: 31.49 KG/M2

## 2023-02-20 DIAGNOSIS — M25.562 LEFT KNEE PAIN, UNSPECIFIED CHRONICITY: Primary | ICD-10-CM

## 2023-02-20 NOTE — PROGRESS NOTES
[facility]   ORTHOPAEDIC SURGERY AND SPORTS MEDICINE  DATE OF VISIT: 02/20/23  New Shoulder Patient Visit     Referring Provider:   Ellis Cherry, [address] [facility]    CHIEF COMPLAINT:   Chief Complaint   Patient presents with    Knee Pain     Ref - SUZAN Robertson - Complex tear of medial meniscus of left knee as current injury, initial encounter - he states last summer , knee popped while getting something underneath the bed, was told had a sprain -    Shoulder Pain     Patient is complaining of right shoulder pain as well today, XR + MRI in Epic - he states last summer in August , fall and PT , no improvement     Other     In pain management for back / spine - they ordered MRI of the knee and shoulder  - pain management gave cortisone injections in knee and shoulder     Pain     Lt knee 3 / 10 --- Rt shld - burning cramp         HPI:      Kika Bartlett is a 56y.o. year old male who is seen today  for evaluation of right shoulder pain and left knee pain. This pain came on last July when he's was standing on a bucket and fell through and injured his left knee and right shoulder. He underwent a period of physical therapy which did not help. He also had injections in the shoulder and knee. Most recent left knee injection has helped significantly. He states he feels the knee is giving out at times. He has very poor function of the right shoulder. The patient is right hand dominant. The patient is not working. The patient is on disability and SS. 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 PAST MEDICAL HISTORY  Past Medical History:   Diagnosis Date    Asthma     Bronchitis     COPD (chronic obstructive pulmonary disease) ([facility] [address]     Coronary artery disease     Hypertension        PAST SURGICAL HISTORY  No past surgical history on file.     FAMILY HISTORY   Family History   Problem Relation Age of Onset    Arthritis Mother     Diabetes Mother     Hypertension Mother     Hypertension Father     Diabetes Brother        SOCIAL HISTORY  Social History     Occupational History    Not on file   Tobacco Use    Smoking status: Never    Smokeless tobacco: Never   Vaping Use    Vaping Use: Never used   Substance and Sexual Activity    Alcohol use: Never    Drug use: Never    Sexual activity: Not on file       CURRENT MEDICATIONS     Current Outpatient Medications:     HYDROcodone-acetaminophen (NORCO) 5-325 MG per tablet, Take 1 tablet by mouth nightly as needed for Pain for up to 23 days. Take lowest dose possible to manage pain Max Daily Amount: 1 tablet, Disp: 23 tablet, Rfl: 0    montelukast (SINGULAIR) 10 MG tablet, TAKE 1 TABLET BY MOUTH EVERY NIGHT AT BEDTIME, Disp: , Rfl:     atorvastatin (LIPITOR) 10 MG tablet, TAKE 1 TABLET BY MOUTH AT BEDTIME, Disp: , Rfl:     diclofenac sodium (VOLTAREN) 1 % GEL, Apply 2 g topically 4 times daily, Disp: 480 g, Rfl: 2    ibuprofen (ADVIL;MOTRIN) 800 MG tablet, Take 800 mg by mouth three times daily, Disp: , Rfl:     cetirizine (ZYRTEC) 10 MG tablet, Take 10 mg by mouth daily, Disp: , Rfl:     loratadine (CLARITIN) 10 MG capsule, Take 10 mg by mouth daily, Disp: , Rfl:     albuterol sulfate  (90 Base) MCG/ACT inhaler, INHALE 2 PUFFS BY MOUTH EVERY 4 HOURS AS NEEDED FOR SHORTNESS OF BREATH, Disp: , Rfl:     SYMBICORT 160-4.5 MCG/ACT AERO, INHALE 2 PUFFS BY MOUTH TWICE DAILY, Disp: , Rfl:     ASPIRIN LOW DOSE 81 MG EC tablet, , Disp: , Rfl:     amLODIPine (NORVASC) 5 MG tablet, TAKE 1 TABLET BY MOUTH EVERY DAY FOR BLOOD PRESSURE, Disp: , Rfl:     losartan-hydroCHLOROthiazide (HYZAAR) 100-25 MG per tablet, TAKE 1 TABLET BY MOUTH DAILY FOR BLOOD PRESSURE, Disp: , Rfl:     Multiple Vitamin (MULTIVITAMIN ADULT PO), Take by mouth daily, Disp: , Rfl:     pregabalin (LYRICA) 150 MG capsule, Take 1 capsule by mouth every evening for 30 days. , Disp: 30 capsule, Rfl: 1    ALLERGIES  No Known Allergies    Controlled Substances Monitoring:        REVIEW OF SYSTEMS:     Constitutional:  Negative for weight loss, fevers, chills, fatigue  Cardiovascular: Negative for chest pain, palpitations  Pulmonary: Negative for shortness of breath, labored breathing, cough  GI: negative for abdominal pain, nausea, vomiting   MSK: per HPI  Skin: negative for rash, open wounds    All other systems reviewed and are negative     PHYSICAL EXAM     VITALS: Ht 5' 5\" (1.651 m)   Wt 189 lb (85.7 kg)   BMI 31.45 kg/m²       General: The patient is alert and oriented x 3, appears to be stated age and in no distress. HEENT: head is normocephalic, atraumatic. EOMI. Neck: supple, trachea midline, no thyromegaly   Cardiovascular: peripheral pulses palpable. Normal Capillary refill   Respiratory: breathing unlabored, chest expansion symmetric   Skin: no rash, no open wounds, no erythema  Psych: normal affect; mood stable  Neurologic: gait normal, sensation grossly intact in extremities  MSK:    Cervical Spine: There is no tenderness to palpation along the cervical spine. Range of motion is normal.  Spurling's is negative    Shoulder Exam:   Exam of the right shoulder shows he can elevate about 70 degrees. Passively can increase him to about 130 degrees with mild pain. He can hold his arm and Sole test position. No significant inability to perform [place]'s and speeds test.  He has pain and decreased motion with belly press test.  He has significant atrophy of the supraspinatus and infraspinatus fossa      On exam of the left knee, he has full range of motion. He has mild medial joint line tenderness. There is mild pain with valgus stress in full and 30 degrees of flexion but no instability. Lachman is stable. Posterior drawer stable. There is no effusion. Hip range of motion is painless      IMAGING:     XRAY:  3 views of the right shoulder show no acute abnormality the right shoulder. There are some subchondral cystic changes of the greater tuberosity. Glenohumeral joint grossly maintained. There is very subtle superior humeral head migration    MRI of the right shoulder shows full-thickness retracted rotator cuff tear with at least grade 3 atrophy of the supraspinatus and infraspinatus. There is split tearing the subscapularis with biceps dislocation    X-rays of the left knee including weightbearing view show maintained joint with no evidence of arthritis    MRI of the left knee shows degenerative medial meniscus tear with minimal extrusion, no evidence of arthritis that is significant in the medial compartment    Radiographic findings reviewed with patient    ASSESSMENT   Right shoulder full-thickness retracted rotator cuff tear involving supraspinatus and infraspinatus, subscapularis split tear with biceps dislocation    Left knee medial meniscus tear      PLAN  We discussed both his right shoulder and left knee today. He has a difficult problem regarding the shoulder. He has a massive retracted rotator cuff tear which does not appear to be repairable. He also split tearing the subscapularis with biceps dislocation. Ultimately was recommended he undergo surgery for this problem. This would involve right shoulder arthroscopy, rotator cuff repair versus superior capsular reconstruction, biceps tenodesis. I am doubtful that his tear is repairable and he would likely require a superior capsule reconstruction. This is a good option at this time given his young age, minimal findings of arthritis, and intact or repairable subscapularis. He is thinking of putting this off until the fall due to some other scheduling issues and things that he has planned. In terms of his left knee, he has a medial meniscus tear with mechanical symptoms which correlates to his imaging and exam findings. He has attempted conservative treatment including steroid injection.   Steroid is currently giving him good relief.   We discussed arthroscopy        Rosalie Dueñas MD  Orthopaedic Surgery   2/20/23  10:51 AM

## 2023-02-21 ENCOUNTER — OFFICE VISIT (OUTPATIENT)
Dept: PAIN MANAGEMENT | Age: 58
End: 2023-02-21
Payer: COMMERCIAL

## 2023-02-21 VITALS
OXYGEN SATURATION: 97 % | DIASTOLIC BLOOD PRESSURE: 75 MMHG | WEIGHT: 189 LBS | BODY MASS INDEX: 31.49 KG/M2 | SYSTOLIC BLOOD PRESSURE: 118 MMHG | HEART RATE: 87 BPM | HEIGHT: 65 IN | TEMPERATURE: 97.9 F | RESPIRATION RATE: 16 BRPM

## 2023-02-21 DIAGNOSIS — M48.062 SPINAL STENOSIS OF LUMBAR REGION WITH NEUROGENIC CLAUDICATION: ICD-10-CM

## 2023-02-21 DIAGNOSIS — G89.29 CHRONIC LEFT SHOULDER PAIN: ICD-10-CM

## 2023-02-21 DIAGNOSIS — G89.29 CHRONIC BILATERAL LOW BACK PAIN WITH BILATERAL SCIATICA: ICD-10-CM

## 2023-02-21 DIAGNOSIS — M25.512 CHRONIC LEFT SHOULDER PAIN: ICD-10-CM

## 2023-02-21 DIAGNOSIS — M50.90 CERVICAL DISC DISORDER: ICD-10-CM

## 2023-02-21 DIAGNOSIS — M48.02 CERVICAL STENOSIS OF SPINE: ICD-10-CM

## 2023-02-21 DIAGNOSIS — G89.4 CHRONIC PAIN SYNDROME: ICD-10-CM

## 2023-02-21 DIAGNOSIS — M54.41 CHRONIC BILATERAL LOW BACK PAIN WITH BILATERAL SCIATICA: ICD-10-CM

## 2023-02-21 DIAGNOSIS — M54.12 CERVICAL RADICULOPATHY: Primary | ICD-10-CM

## 2023-02-21 DIAGNOSIS — M25.562 CHRONIC PAIN OF LEFT KNEE: ICD-10-CM

## 2023-02-21 DIAGNOSIS — G89.29 CHRONIC PAIN OF LEFT KNEE: ICD-10-CM

## 2023-02-21 DIAGNOSIS — M54.42 CHRONIC BILATERAL LOW BACK PAIN WITH BILATERAL SCIATICA: ICD-10-CM

## 2023-02-21 DIAGNOSIS — M54.16 LUMBAR RADICULOPATHY: ICD-10-CM

## 2023-02-21 PROCEDURE — 99213 OFFICE O/P EST LOW 20 MIN: CPT | Performed by: PHYSICIAN ASSISTANT

## 2023-02-21 PROCEDURE — G8484 FLU IMMUNIZE NO ADMIN: HCPCS | Performed by: PHYSICIAN ASSISTANT

## 2023-02-21 PROCEDURE — 3078F DIAST BP <80 MM HG: CPT | Performed by: PHYSICIAN ASSISTANT

## 2023-02-21 PROCEDURE — 1036F TOBACCO NON-USER: CPT | Performed by: PHYSICIAN ASSISTANT

## 2023-02-21 PROCEDURE — 3017F COLORECTAL CA SCREEN DOC REV: CPT | Performed by: PHYSICIAN ASSISTANT

## 2023-02-21 PROCEDURE — G8427 DOCREV CUR MEDS BY ELIG CLIN: HCPCS | Performed by: PHYSICIAN ASSISTANT

## 2023-02-21 PROCEDURE — 3074F SYST BP LT 130 MM HG: CPT | Performed by: PHYSICIAN ASSISTANT

## 2023-02-21 PROCEDURE — G8417 CALC BMI ABV UP PARAM F/U: HCPCS | Performed by: PHYSICIAN ASSISTANT

## 2023-02-21 NOTE — PROGRESS NOTES
Do you currently have any of the following:    Fever: No  Headache:  No  Cough: No  Shortness of breath: No  Exposed to anyone with these symptoms: No         Stefanie Mayorga presents to the [facility] on 2/21/2023. Josiane Melvin is complaining of pain neck and lower back. The pain is constant. The pain is described as aching. Pain is rated on his best day at a 8, on his worst day at a 10, and on average at a 8 on the VAS scale. He took his last dose of Norco PRN. Any procedures since your last visit: No.    Pacemaker or defibrillator: No.    He is  on NSAIDS and is  on anticoagulation medications. Medication Contract and Consent for Opioid Use Documents Filed       Patient Documents       Type of Document Status Date Received Received By Description    Medication Contract Received 1/24/2023  4:21 PM [facility]                    Temp 97.9 °F (36.6 °C)   Resp 16      No LMP for male patient.

## 2023-02-21 NOTE — PROGRESS NOTES
[facility] Pain Management  [facility] [address]  [facility]    Follow up Note      April Rodriguez     Date of Visit:  2023    CC:  Patient presents for follow up   No chief complaint on file. HPI:    Pain is worse to his left shoulder. States that this is probably due to his right arm being so painful and using his left arm mostly. Meron Wong Appropriate analgesia with current medications regimen: yes - somewhat. .    Change in quality of symptoms:no. Medication side effects:none. Recent diagnostic testing:none. Recent interventional procedures:none. He has been on anticoagulation medications to include ASA and NSAIDS and has not been on herbal supplements. He is not diabetic. Imagin/2022 xray rt shoulder -  FINDINGS:   Three views of the right shoulder were obtained. There is no acute fracture   dislocation right shoulder. There are degenerative changes of the [facility] joint. The glenohumeral joint is intact and unremarkable. There is no fracture of   the right scapula. Impression   1. There is no fracture or dislocation of the right shoulder   2. Significant degenerative changes of the [facility] joint. 2022 xray lumbar f/e -      FINDINGS:   Anterolisthesis of L4 on L5 is 7 mm in flexion, 4 mm in the neutral position   and 1 mm in extension. There is mild multilevel degenerative disc disease. There is moderate degenerative facet arthropathy from L4-S1. Impression   Findings of spinal instability at L4-5 as noted. Degenerative lumbar   spondylosis. 2022 EDX -  Diagnostic Interpretation:   Normal emg of lower extremities; however, lumbar paraphasic potentials correlate with mri finding of severe stenosis such that patient is likely symptomatic from lumbar stenosis. Clinical correlation advised.         Technologist: Marylou Gleason MD     Nerve conduction studies and electromyography were performed according to our laboratory policies and procedures which can be provided upon request. All abnormal values are identified in the table. Laboratory normal values can also be provided upon request.        Cc: MD Fide Malave, DO     2022 lumbar MRI -  FINDINGS:   BONES/ALIGNMENT: The vertebral body heights appear maintained. There is   grade 1 anterolisthesis at L4-L5 with mild retrolisthesis at L5-S1. There is   loss of disc space height with Modic type 2 degenerative endplate changes at   X9-V9. Minimal bone marrow edema is seen associated with L4-L5 facet   arthrosis bilaterally. Otherwise, the bone marrow signal demonstrates no   acute abnormality. SPINAL CORD: The conus terminates at a normal level. There is question of   clumping of the caudal quadrant nerve roots. SOFT TISSUES: No paraspinal mass identified. L1-L2: There is a left paracentral disc protrusion contacting the ventral   thecal sac. No significant spinal canal stenosis. No significant neural   foraminal narrowing. L2-L3: There is a disc bulge, which contacts the ventral thecal sac. Minimal   spinal canal stenosis. Facet arthrosis contributes to minimal right neural   foraminal narrowing. No significant left neural foraminal narrowing. L3-L4: There is a disc bulge with a left foraminal predominance indenting on   the ventral thecal sac. Mild-to-moderate spinal canal stenosis. Facet   arthrosis contributes to minimal bilateral neural foraminal narrowing. L4-L5: There is a disc bulge with buckling of the ligamentum flavum and facet   arthrosis. Severe spinal canal stenosis. Mild-to-moderate right and mild   left neural foraminal narrowing. L5-S1: There is a disc bulge with a central disc protrusion along with   bilateral facet arthrosis. Moderate to severe right and moderate left neural   foraminal narrowing. Impression   1.  Severe spinal canal stenosis at L4-L5 with mild-to-moderate stenosis at   L3-L4. Minimal spinal canal stenosis at L2-L3. 2. Multilevel neural foraminal narrowing as above. 3. Grade 1 anterolisthesis at L4-L5 with mild retrolisthesis at L5-S1.   4. There is question of clumping of the cauda quadrant nerve roots, which can   be seen with prior arachnoiditis. 3/2022 MRI cervical -  RESULT:     Counting reference:  Craniocervical junction. Anatomic Variants:  None. Localizer images:  No additional findings. Alignment:    There is straightening of normal cervical lordosis. There   is very trace retrolisthesis of C5 on C6. Craniocervical junction:    Craniocervical junction is normal.     Cord: The visualized cord is within normal limits of signal intensity   and morphology. Bone marrow signal/fracture:    No evidence of pathologic marrow   infiltration. No evidence of prior fracture. There are Modic type I   degenerative endplate changes at I7-Q5 with moderate disc space height   loss. There is congenital narrowing of the spinal canal.     Cervical soft tissues: The paraspinal soft tissues are within normal   limits. C2-C3: Canal and foramina are patent. C3-C4: Minor disc bulge is seen with mild narrowing of the spinal canal.     There is moderate left neural foraminal narrowing due to uncovertebral   arthropathy. There is no right neural foraminal narrowing. C4-C5: There is right central disc protrusion that mildly impresses on   the ventral cord and there is mild to moderate narrowing of the spinal   canal.  There is mild right neural foraminal narrowing due to   uncovertebral arthropathy. There is no left neural foraminal narrowing. C5-C6: There is disc osteophyte complex asymmetric to the left that   effaces the ventral CSF impinging on the ventral cord and causing   moderate stenosis of the spinal canal.  There is severe left neural   foraminal stenosis due to uncovertebral arthropathy and disc bulge.   The   right neural foramen is patent. C6-C7: Minor disc bulge and mild ligamentum flavum thickening is seen   with mild narrowing of the spinal canal.  There is mild right neural   foraminal narrowing due to uncovertebral arthropathy. There is no left   neural foraminal narrowing. C7-T1: There is moderate left neural foraminal narrowing due to   uncovertebral and facet joint arthropathy. There is minor disc bulge   without spinal canal or right neural foraminal narrowing. IMPRESSION   IMPRESSION:     1.  Multilevel degenerative cervical spondylosis. 2.  At C5-C6 there is moderate stenosis of the spinal canal with cord   impingement. There is also severe left neural foraminal stenosis. 3.  Congenital narrowing of the spinal canal.   4.  Modic type I degenerative endplate change at O0-L5. 5.  Level by level description as detailed. Anatomic Variant:  None. Assume 7 cervical vertebrae with counting from   the craniocervical junction. 2022 cervical xray -  FINDINGS:   No fracture or malalignment. No evidence of instability on flexion or   extension imaging. Mild narrowing of disc space at C5/C6. No prevertebral   soft tissue edema. Impression   1. No evidence of fracture or malalignment. 2. No evidence of instability on flexion or extension imaging. Potential Aberrant Drug-Related Behavior:       Urine Drug Screenin2022 consistent     OARRS report:  2022 to 2023 consistent    Opioid Agreement:  2023    Past Medical History:   Diagnosis Date    Asthma     Bronchitis     COPD (chronic obstructive pulmonary disease) ([facility] [address]     Coronary artery disease     Hypertension        History reviewed. No pertinent surgical history. Prior to Admission medications    Medication Sig Start Date End Date Taking?  Authorizing Provider   HYDROcodone-acetaminophen (NORCO) 5-325 MG per tablet Take 1 tablet by mouth nightly as needed for Pain for up to 23 days. Take lowest dose possible to manage pain Max Daily Amount: 1 tablet 2/8/23 3/3/23 Yes Tonie Rocha DO   montelukast (SINGULAIR) 10 MG tablet TAKE 1 TABLET BY MOUTH EVERY NIGHT AT BEDTIME 12/30/22  Yes Historical Provider, MD   atorvastatin (LIPITOR) 10 MG tablet TAKE 1 TABLET BY MOUTH AT BEDTIME 9/6/22  Yes Historical Provider, MD   diclofenac sodium (VOLTAREN) 1 % GEL Apply 2 g topically 4 times daily 12/2/22  Yes SUZAN Lanier   ibuprofen (ADVIL;MOTRIN) 800 MG tablet Take 800 mg by mouth three times daily   Yes Historical Provider, MD   cetirizine (ZYRTEC) 10 MG tablet Take 10 mg by mouth daily   Yes Historical Provider, MD   loratadine (CLARITIN) 10 MG capsule Take 10 mg by mouth daily   Yes Historical Provider, MD   albuterol sulfate  (90 Base) MCG/ACT inhaler INHALE 2 PUFFS BY MOUTH EVERY 4 HOURS AS NEEDED FOR SHORTNESS OF BREATH 12/24/21  Yes Historical Provider, MD   SYMBICORT 160-4.5 MCG/ACT AERO INHALE 2 PUFFS BY MOUTH TWICE DAILY 12/22/21  Yes Historical Provider, MD   amLODIPine (NORVASC) 5 MG tablet TAKE 1 TABLET BY MOUTH EVERY DAY FOR BLOOD PRESSURE 12/11/21  Yes Historical Provider, MD   losartan-hydroCHLOROthiazide (HYZAAR) 100-25 MG per tablet TAKE 1 TABLET BY MOUTH DAILY FOR BLOOD PRESSURE 11/29/21  Yes Historical Provider, MD   Multiple Vitamin (MULTIVITAMIN ADULT PO) Take by mouth daily   Yes Historical Provider, MD   pregabalin (LYRICA) 150 MG capsule Take 1 capsule by mouth every evening for 30 days.  12/2/22 1/1/23  SUZAN Lanier   ASPIRIN LOW DOSE 81 MG EC tablet  12/11/21   Historical Provider, MD       No Known Allergies    Social History     Socioeconomic History    Marital status:      Spouse name: Not on file    Number of children: Not on file    Years of education: Not on file    Highest education level: Not on file   Occupational History    Not on file   Tobacco Use    Smoking status: Never    Smokeless tobacco: Never   Vaping Use    Vaping Use: Never used   Substance and Sexual Activity    Alcohol use: Never    Drug use: Never    Sexual activity: Not on file   Other Topics Concern    Not on file   Social History Narrative    Not on file     Social Determinants of Health     Financial Resource Strain: Not on file   Food Insecurity: Not on file   Transportation Needs: Not on file   Physical Activity: Unknown    Days of Exercise per Week: 0 days    Minutes of Exercise per Session: Not on file   Stress: Not on file   Social Connections: Not on file   Intimate Partner Violence: Not At Risk    Fear of Current or Ex-Partner: No    Emotionally Abused: No    Physically Abused: No    Sexually Abused: No   Housing Stability: Not on file       Family History   Problem Relation Age of Onset    Arthritis Mother     Diabetes Mother     Hypertension Mother     Hypertension Father     Diabetes Brother        REVIEW OF SYSTEMS:     Jb denies fever/chills, chest pain, shortness of breath, new bowel or bladder complaints. All other review of systems was negative.    PHYSICAL EXAMINATION:      /75   Pulse 87   Temp 97.9 °F (36.6 °C)   Resp 16   Ht 5' 5\" (1.651 m)   Wt 189 lb (85.7 kg)   SpO2 97%   BMI 31.45 kg/m²     General:      General appearance:   pleasant and well-hydrated.   , in moderate discomfort and A & O x3  Build:Overweight    HEENT:    Head:normocephalic and atraumatic  Sclera: icterus absent,    Lungs:    Breathing:Normal expansion.  No wheezing.    Abdomen:    Shape:non-distended and normal    Lumbar spine:    Range of motion:abnormal moderately Lateral bending, flexion, extension rotation bilateral and is  painful.      Extremities:    Tremors:None bilaterally upper and lower  Range of motion:Generally limited extension shoulder - right, pain with internal rotation of hips not done.  Intact:Yes  Edema:Normal    Left shoulder:  FF - 160 degrees  Abduction at 150 degrees  He is lacking 25 degrees in  IR/ER      Neurological:    [address]    Dermatology:    Skin:no unusual rashes, no skin lesions, no palpable subcutaneous nodules, and good skin turgor    Impression:    Cervical pain and left upper extremity radiculopathy in C5 and C6 distribution  3/2022 cervical MRI shows herniated disc at C5-C6 there is moderate stenosis of the spinal canal with cord impingement with severe left neural foraminal stenosis  PMHx: environ allergies, COPD/asthma, HTN, CAD  LBP L>R posterior LE pain  2022 lumbar MRI shows severe L4-5 stenosis  2022 lumbar f/e films shows instability at L4-5  2022 EDX BLE shows normal but does show changes consistent with severe L4-5 stenosis  Moved her from [facility] 12/2020 due to health concerns and [facility] does not had an adequate health care system  Has not worked since coming to the [facility] due to health issues  Has lumbar symptoms as well - had eval with Dr. Gila Aguila     He fell over the summer onto his rt shoulder, since that time has decreased shoulder function and pain, continues to be severe  He is able to passively perform rt shoulder ROM but could not actively abduct thus it appears he has a RTC injury at the last visit, today he is able to do this although it is slow  He did not return to PT as he felt the PT was too much for him, he has continued with an active HEP  He continues to c/o decreased function to his right shoulder today.    MRI rt shoulder reviewed and shows significant pathology  He has upcoming appt with ortho to discuss  This pain is greatly affecting his sleep  Patient requests medication to treat this pain and allow him to sleep     From last visit: He describes some stool>incontinence when he gets severe \"electrical pain\", has happened approx 3 times and it last occurred 2 weeks ago, he states \"I'm not pooing on myself like a baby, only a little bit comes out\"  He was to f/u with Dr. Gila Aguila, states he was unaware  He will call her office to f/u and I will forwarded last visit's note to her  He is aware of s/s that would prompt immediate eval in the ER at [facility]        Plan:     Patient has seen Dr. Evonne Pinedo. He will most likely be having surgery to his left knee and right shoulder in the future. Left shoulder pain - x-ray ordered. CSI ordered today but must have x-ray completed before it is done. Urine screen deferred  OARRS report reviewed  Failed pregabalin 150 mg BD due to feeling angry  stop pregabalin 150 mg at HS - not helpful  stop Diclofenac gel, 2 RF - not helpful  Continue Norco 5/325 #30. No RF. He has only used 4 pills. Completed 4 sessions of PT, continues with active HEP  C7-T1 VERNA #1 - r/b and procedure discussed, denied until he completes PT, not needed at this time  B/l L5 TFESI #1 - apprehensive  Patient encouraged to stay active   Treatment plan discussed with the patient including medication and side effects          Controlled Substance Monitoring:    Acute and Chronic Pain Monitoring:   RX Monitoring 2/21/2023   Periodic Controlled Substance Monitoring Possible medication side effects, risk of tolerance/dependence & alternative treatments discussed. ;No signs of potential drug abuse or diversion identified. ;Assessed functional status. We discussed with the patient that combining opioids, benzodiazepines, alcohol, illicit drugs or sleep aids increases the risk of respiratory depression including death. We discussed that these medications may cause drowsiness, sedation or dizziness and have counseled the patient not to drive or operate machinery. We have discussed that these medications will be prescribed only by one provider. We have discussed with the patient about age related risk factors and have thoroughly discussed the importance of taking these medications as prescribed. The patient verbalizes understanding.     ccreferring physic

## 2023-03-06 ENCOUNTER — HOSPITAL ENCOUNTER (OUTPATIENT)
Dept: GENERAL RADIOLOGY | Age: 58
Discharge: HOME | End: 2023-03-08
Payer: COMMERCIAL

## 2023-03-06 ENCOUNTER — HOSPITAL ENCOUNTER (OUTPATIENT)
Age: 58
Discharge: HOME | End: 2023-03-08
Payer: COMMERCIAL

## 2023-03-06 DIAGNOSIS — M25.512 CHRONIC LEFT SHOULDER PAIN: ICD-10-CM

## 2023-03-06 DIAGNOSIS — G89.29 CHRONIC LEFT SHOULDER PAIN: ICD-10-CM

## 2023-03-06 PROCEDURE — 73030 X-RAY EXAM OF SHOULDER: CPT

## 2023-03-10 ENCOUNTER — TELEPHONE (OUTPATIENT)
Dept: PAIN MANAGEMENT | Age: 58
End: 2023-03-10

## 2023-03-10 DIAGNOSIS — G89.29 CHRONIC LEFT SHOULDER PAIN: Primary | ICD-10-CM

## 2023-03-10 DIAGNOSIS — M25.512 CHRONIC LEFT SHOULDER PAIN: Primary | ICD-10-CM

## 2023-03-10 NOTE — TELEPHONE ENCOUNTER
Radiologist recommends a MRI of the L shoulder. I ordered this, please instruct on scheduling. He can have the injection after the MRI - OK to schedule.      Thanks

## 2023-03-10 NOTE — TELEPHONE ENCOUNTER
Justyna Jose called said he got an xray done if his left shoulder and is wondering if he can be scheduled for a shoulder injection. Please advise.

## 2023-03-13 NOTE — TELEPHONE ENCOUNTER
I called and gave Jb the phone number to schedule his shoulder MRI. He is going to call our office back once the MRI is set.

## 2023-03-17 ENCOUNTER — PROCEDURE VISIT (OUTPATIENT)
Dept: PAIN MANAGEMENT | Age: 58
End: 2023-03-17
Payer: COMMERCIAL

## 2023-03-17 VITALS
TEMPERATURE: 97.4 F | RESPIRATION RATE: 16 BRPM | WEIGHT: 189 LBS | BODY MASS INDEX: 31.49 KG/M2 | OXYGEN SATURATION: 95 % | HEIGHT: 65 IN | DIASTOLIC BLOOD PRESSURE: 68 MMHG | HEART RATE: 77 BPM | SYSTOLIC BLOOD PRESSURE: 146 MMHG

## 2023-03-17 DIAGNOSIS — M25.512 CHRONIC LEFT SHOULDER PAIN: Primary | ICD-10-CM

## 2023-03-17 DIAGNOSIS — G89.29 CHRONIC LEFT SHOULDER PAIN: Primary | ICD-10-CM

## 2023-03-17 PROCEDURE — 99213 OFFICE O/P EST LOW 20 MIN: CPT | Performed by: PAIN MEDICINE

## 2023-03-17 PROCEDURE — 20610 DRAIN/INJ JOINT/BURSA W/O US: CPT | Performed by: PAIN MEDICINE

## 2023-03-17 RX ORDER — METHYLPREDNISOLONE ACETATE 40 MG/ML
40 INJECTION, SUSPENSION INTRA-ARTICULAR; INTRALESIONAL; INTRAMUSCULAR; SOFT TISSUE ONCE
Status: COMPLETED | OUTPATIENT
Start: 2023-03-17 | End: 2023-03-17

## 2023-03-17 RX ORDER — BUPIVACAINE HYDROCHLORIDE 2.5 MG/ML
3 INJECTION, SOLUTION EPIDURAL; INFILTRATION; INTRACAUDAL ONCE
Status: COMPLETED | OUTPATIENT
Start: 2023-03-17 | End: 2023-03-17

## 2023-03-17 RX ADMIN — METHYLPREDNISOLONE ACETATE 40 MG: 40 INJECTION, SUSPENSION INTRA-ARTICULAR; INTRALESIONAL; INTRAMUSCULAR; INTRASYNOVIAL; SOFT TISSUE at 13:54

## 2023-03-17 RX ADMIN — BUPIVACAINE HYDROCHLORIDE 7.5 MG: 2.5 INJECTION, SOLUTION EPIDURAL; INFILTRATION; INTRACAUDAL at 13:51

## 2023-03-17 NOTE — PROGRESS NOTES
3/17/2023    Chief Complaint   Patient presents with    Injections     Left shoulder injection. Allergies as of 03/17/2023    (No Known Allergies)        Procedure: left shoulder injection     y consent signed y procedure verified with patient  y allergies noted y pt confirms not pregnant    Patient rates pain a 10/10 on the VAS scale. Skin Prep:  ChloraPrep    Anesthetic:  n/a    Medication:  Marcaine 0.25% and DepMedrol    Vitals:    03/17/23 1245   BP: (!) 146/68   Pulse: 77   Resp: 16   Temp: 97.4 °F (36.3 °C)   TempSrc: Infrared   SpO2: 95%   Weight: 189 lb (85.7 kg)   Height: 5' 5\" (1.651 m)       I witnessed patient signing consent to Medical Procedure and Treatment form.      Pasha Gonzales RN

## 2023-03-17 NOTE — PROGRESS NOTES
3/17/2023    Patient: Lidia Faulkner  :  1965  Age:  56 y.o. Sex:  male     PRE-OPERATIVE DIAGNOSIS: Left   Shoulder pain, osteoarthritis. POST-OPERATIVE DIAGNOSIS: Same. PROCEDURE PERFORMED: Left  Shoulder steroid injection. SURGEON:   KIMANI Rowe. ANESTHESIA: local    ESTIMATED BLOOD LOSS: None. BRIEF HISTORY: Lidia Faulkner comes in today for Left  Shoulder steroid injection. After discussing the potential risks and benefits of the procedure with the patient. Jb did request that we proceed. A complete History & Physical was reviewed and it is unchanged. DESCRIPTION OF PROCEDURE:   The patient was placed in a seated position. The area of the Left  shoulder was prepped with chloraprep and draped in a sterile manner. Using the posterior approach, a 25 gauge 1 1/2 inch  needle was advanced  In anterolateral projection into the joint capsule. After negative aspiration a solution of 0.25 % marcaine 3 cc and 40 mg DepoMedrol was injected easily without complications. The needle was then removed and Band-Aid applied. Disposition the patient tolerated the procedure well and there were no complications . Hugo Way will follow up in our [address] as scheduled. He was encouraged to call with questions, concerns or if worsening of symptoms occurs.     Mikey Vasquez DO

## 2023-04-03 ENCOUNTER — HOSPITAL ENCOUNTER (OUTPATIENT)
Dept: MRI IMAGING | Age: 58
Discharge: HOME | End: 2023-04-05
Payer: COMMERCIAL

## 2023-04-03 DIAGNOSIS — G89.29 CHRONIC LEFT SHOULDER PAIN: ICD-10-CM

## 2023-04-03 DIAGNOSIS — M25.512 CHRONIC LEFT SHOULDER PAIN: ICD-10-CM

## 2023-04-03 PROCEDURE — 73221 MRI JOINT UPR EXTREM W/O DYE: CPT

## 2023-04-04 ENCOUNTER — TELEPHONE (OUTPATIENT)
Dept: PAIN MANAGEMENT | Age: 58
End: 2023-04-04

## 2023-04-04 DIAGNOSIS — G89.29 CHRONIC LEFT SHOULDER PAIN: Primary | ICD-10-CM

## 2023-04-04 DIAGNOSIS — M25.512 CHRONIC LEFT SHOULDER PAIN: Primary | ICD-10-CM

## 2023-04-04 NOTE — TELEPHONE ENCOUNTER
Ely Bryannas called in because he got his MRI yesterday and wanted to go over the results. He does not have an office visit scheduled. Do you want him to come into the office for an appointment? Please advise. Thanks.

## 2023-04-06 RX ORDER — HYDROCODONE BITARTRATE AND ACETAMINOPHEN 5; 325 MG/1; MG/1
1 TABLET ORAL DAILY PRN
Qty: 30 TABLET | Refills: 0 | Status: SHIPPED | OUTPATIENT
Start: 2023-04-06 | End: 2023-05-06

## 2023-04-06 NOTE — TELEPHONE ENCOUNTER
Tried to call Dominic Ruiz to get him scheduled for a month follow up, but his number was not accepting calls. Couldn't leave a message. Will try again later.

## 2023-04-06 NOTE — TELEPHONE ENCOUNTER
Spoke to  Vidal Rob  Recommend he f/u with Dr. Arslan Chau to discuss his left shoulder MRI results  The injection was helpful but he has increased pain from the positioning from the MRI  He is out of [place] reviewed and is appropriate, Rf Norco #30 escribed    Please schedule him a one month f/u with either Chaparrita Brandon or myself. Thank you.

## 2023-05-10 ENCOUNTER — OFFICE VISIT (OUTPATIENT)
Dept: PAIN MANAGEMENT | Age: 58
End: 2023-05-10
Payer: COMMERCIAL

## 2023-05-10 VITALS
DIASTOLIC BLOOD PRESSURE: 65 MMHG | HEART RATE: 71 BPM | BODY MASS INDEX: 31.49 KG/M2 | WEIGHT: 189 LBS | SYSTOLIC BLOOD PRESSURE: 111 MMHG | TEMPERATURE: 98.2 F | HEIGHT: 65 IN | RESPIRATION RATE: 16 BRPM | OXYGEN SATURATION: 98 %

## 2023-05-10 DIAGNOSIS — G89.29 CHRONIC PAIN OF LEFT KNEE: ICD-10-CM

## 2023-05-10 DIAGNOSIS — M25.562 CHRONIC PAIN OF LEFT KNEE: ICD-10-CM

## 2023-05-10 DIAGNOSIS — M54.16 LUMBAR RADICULOPATHY: ICD-10-CM

## 2023-05-10 DIAGNOSIS — M48.062 SPINAL STENOSIS OF LUMBAR REGION WITH NEUROGENIC CLAUDICATION: ICD-10-CM

## 2023-05-10 DIAGNOSIS — G89.29 CHRONIC BILATERAL LOW BACK PAIN WITH BILATERAL SCIATICA: ICD-10-CM

## 2023-05-10 DIAGNOSIS — M25.512 CHRONIC LEFT SHOULDER PAIN: ICD-10-CM

## 2023-05-10 DIAGNOSIS — M48.02 CERVICAL STENOSIS OF SPINE: ICD-10-CM

## 2023-05-10 DIAGNOSIS — G89.4 CHRONIC PAIN SYNDROME: Primary | ICD-10-CM

## 2023-05-10 DIAGNOSIS — M54.41 CHRONIC BILATERAL LOW BACK PAIN WITH BILATERAL SCIATICA: ICD-10-CM

## 2023-05-10 DIAGNOSIS — G89.29 CHRONIC LEFT SHOULDER PAIN: ICD-10-CM

## 2023-05-10 DIAGNOSIS — M54.12 CERVICAL RADICULOPATHY: ICD-10-CM

## 2023-05-10 DIAGNOSIS — M54.42 CHRONIC BILATERAL LOW BACK PAIN WITH BILATERAL SCIATICA: ICD-10-CM

## 2023-05-10 PROCEDURE — G8427 DOCREV CUR MEDS BY ELIG CLIN: HCPCS | Performed by: PHYSICIAN ASSISTANT

## 2023-05-10 PROCEDURE — 1036F TOBACCO NON-USER: CPT | Performed by: PHYSICIAN ASSISTANT

## 2023-05-10 PROCEDURE — 3078F DIAST BP <80 MM HG: CPT | Performed by: PHYSICIAN ASSISTANT

## 2023-05-10 PROCEDURE — 3074F SYST BP LT 130 MM HG: CPT | Performed by: PHYSICIAN ASSISTANT

## 2023-05-10 PROCEDURE — 99213 OFFICE O/P EST LOW 20 MIN: CPT | Performed by: PHYSICIAN ASSISTANT

## 2023-05-10 PROCEDURE — G8417 CALC BMI ABV UP PARAM F/U: HCPCS | Performed by: PHYSICIAN ASSISTANT

## 2023-05-10 PROCEDURE — 3017F COLORECTAL CA SCREEN DOC REV: CPT | Performed by: PHYSICIAN ASSISTANT

## 2023-05-10 RX ORDER — LIDOCAINE 36 MG/1
1 PATCH TOPICAL DAILY PRN
Qty: 30 PATCH | Refills: 2 | Status: SHIPPED | OUTPATIENT
Start: 2023-05-10 | End: 2023-06-09

## 2023-05-10 RX ORDER — HYDROCODONE BITARTRATE AND ACETAMINOPHEN 5; 325 MG/1; MG/1
1 TABLET ORAL DAILY PRN
Qty: 30 TABLET | Refills: 0 | Status: SHIPPED | OUTPATIENT
Start: 2023-05-10 | End: 2023-06-09

## 2023-05-10 NOTE — PROGRESS NOTES
[facility] Pain [facility]  [address]  [facility], [facility]    Follow up Note      Panfilo Qiu     Date of Visit:  5/10/2023    CC:  Patient presents for follow up   Chief Complaint   Patient presents with    Follow-up     Cervical          HPI:    Pain is slightly better at times. Appropriate analgesia with current medications regimen: yes - somewhat. .    Change in quality of symptoms:no. Medication side effects:none. Recent diagnostic testing:none. Recent interventional procedures: 2023    PROCEDURE PERFORMED: Left  Shoulder steroid injection. with mild relief. He has been on anticoagulation medications to include ASA and NSAIDS and has not been on herbal supplements. He is not diabetic. Imagin/2022 xray rt shoulder -  FINDINGS:   Three views of the right shoulder were obtained. There is no acute fracture   dislocation right shoulder. There are degenerative changes of the [facility] joint. The glenohumeral joint is intact and unremarkable. There is no fracture of   the right scapula. Impression   1. There is no fracture or dislocation of the right shoulder   2. Significant degenerative changes of the [facility] joint. 2022 xray lumbar f/e -      FINDINGS:   Anterolisthesis of L4 on L5 is 7 mm in flexion, 4 mm in the neutral position   and 1 mm in extension. There is mild multilevel degenerative disc disease. There is moderate degenerative facet arthropathy from L4-S1. Impression   Findings of spinal instability at L4-5 as noted. Degenerative lumbar   spondylosis. 2022 EDX -  Diagnostic Interpretation:   Normal emg of lower extremities; however, lumbar paraphasic potentials correlate with mri finding of severe stenosis such that patient is likely symptomatic from lumbar stenosis. Clinical correlation advised.         Technologist: Arabella Wan MD     Nerve conduction studies and electromyography were performed

## 2023-05-10 NOTE — PROGRESS NOTES
Evy Dhaliwal presents to the [facility] on 5/10/2023. Jose Miguel Muñoz is complaining of pain cervical  . The pain is constant. The pain is described as aching and throbbing  Pain is rated on his best day at a 7, on his worst day at a 10, and on average at a 8 on the VAS scale. He took his last dose of advil 2 weeks ago . Any procedures since your last visit: No,    Pacemaker or defibrillator: No     He is not on NSAIDS and is not on anticoagulation medications  Medication Contract and Consent for Opioid Use Documents Filed       Patient Documents       Type of Document Status Date Received Received By Description    Medication Contract Received 1/24/2023  4:21 PM [facility]                    /65   Pulse 71   Temp 98.2 °F (36.8 °C) (Temporal)   Resp 16   Ht 5' 5\" (1.651 m)   Wt 189 lb (85.7 kg)   SpO2 98%   BMI 31.45 kg/m²      No LMP for male patient.

## 2023-07-03 ENCOUNTER — TELEPHONE (OUTPATIENT)
Dept: PAIN MANAGEMENT | Age: 58
End: 2023-07-03

## 2023-07-03 DIAGNOSIS — M25.512 CHRONIC LEFT SHOULDER PAIN: ICD-10-CM

## 2023-07-03 DIAGNOSIS — G89.4 CHRONIC PAIN SYNDROME: Primary | ICD-10-CM

## 2023-07-03 DIAGNOSIS — M25.562 CHRONIC PAIN OF LEFT KNEE: ICD-10-CM

## 2023-07-03 DIAGNOSIS — G89.29 CHRONIC PAIN OF LEFT KNEE: ICD-10-CM

## 2023-07-03 DIAGNOSIS — G89.29 CHRONIC LEFT SHOULDER PAIN: ICD-10-CM

## 2023-07-03 NOTE — TELEPHONE ENCOUNTER
Patient calling in for refill of Norco, he states he has been out for 2 weeks and has been leaving voicemail for the office but has not received any calls back. I don't see any documentation of those voicemail being received.     Last fill 5/10/23 per OARRS   Last OV 5/10/23  Next OV 7/11/23

## 2023-07-05 RX ORDER — HYDROCODONE BITARTRATE AND ACETAMINOPHEN 5; 325 MG/1; MG/1
1 TABLET ORAL DAILY PRN
Qty: 30 TABLET | Refills: 0 | Status: SHIPPED
Start: 2023-07-05 | End: 2023-07-12

## 2023-07-10 NOTE — TELEPHONE ENCOUNTER
I spoke to [facility], he stated that he did get his Norco and it is not helping at all. He is having 10/10 low back and left shoulder pain. He has tried heat, ice, Lidocaine patches and the pain medication and nothing is helping. He would like to know if he is able to have a left should injection during his visit tomorrow 7-11-23. His last one was completed in March. Please advise.

## 2023-07-11 ENCOUNTER — OFFICE VISIT (OUTPATIENT)
Dept: PAIN MANAGEMENT | Age: 58
End: 2023-07-11
Payer: COMMERCIAL

## 2023-07-11 VITALS
SYSTOLIC BLOOD PRESSURE: 130 MMHG | DIASTOLIC BLOOD PRESSURE: 71 MMHG | OXYGEN SATURATION: 94 % | BODY MASS INDEX: 31.49 KG/M2 | HEART RATE: 72 BPM | TEMPERATURE: 97.4 F | WEIGHT: 189 LBS | RESPIRATION RATE: 16 BRPM | HEIGHT: 65 IN

## 2023-07-11 DIAGNOSIS — G89.4 CHRONIC PAIN SYNDROME: Primary | ICD-10-CM

## 2023-07-11 DIAGNOSIS — G89.29 CHRONIC LEFT SHOULDER PAIN: ICD-10-CM

## 2023-07-11 DIAGNOSIS — M50.90 CERVICAL DISC DISORDER: ICD-10-CM

## 2023-07-11 DIAGNOSIS — M54.41 CHRONIC BILATERAL LOW BACK PAIN WITH BILATERAL SCIATICA: ICD-10-CM

## 2023-07-11 DIAGNOSIS — M48.062 SPINAL STENOSIS OF LUMBAR REGION WITH NEUROGENIC CLAUDICATION: ICD-10-CM

## 2023-07-11 DIAGNOSIS — M48.02 CERVICAL STENOSIS OF SPINE: ICD-10-CM

## 2023-07-11 DIAGNOSIS — M54.16 LUMBAR RADICULOPATHY: ICD-10-CM

## 2023-07-11 DIAGNOSIS — M54.12 CERVICAL RADICULOPATHY: ICD-10-CM

## 2023-07-11 DIAGNOSIS — M25.562 CHRONIC PAIN OF LEFT KNEE: ICD-10-CM

## 2023-07-11 DIAGNOSIS — G89.29 CHRONIC BILATERAL LOW BACK PAIN WITH BILATERAL SCIATICA: ICD-10-CM

## 2023-07-11 DIAGNOSIS — G89.29 CHRONIC PAIN OF LEFT KNEE: ICD-10-CM

## 2023-07-11 DIAGNOSIS — M54.42 CHRONIC BILATERAL LOW BACK PAIN WITH BILATERAL SCIATICA: ICD-10-CM

## 2023-07-11 DIAGNOSIS — M25.512 CHRONIC LEFT SHOULDER PAIN: ICD-10-CM

## 2023-07-11 PROCEDURE — G8427 DOCREV CUR MEDS BY ELIG CLIN: HCPCS | Performed by: PHYSICIAN ASSISTANT

## 2023-07-11 PROCEDURE — 99213 OFFICE O/P EST LOW 20 MIN: CPT | Performed by: PHYSICIAN ASSISTANT

## 2023-07-11 PROCEDURE — 3017F COLORECTAL CA SCREEN DOC REV: CPT | Performed by: PHYSICIAN ASSISTANT

## 2023-07-11 PROCEDURE — G8417 CALC BMI ABV UP PARAM F/U: HCPCS | Performed by: PHYSICIAN ASSISTANT

## 2023-07-11 PROCEDURE — 3075F SYST BP GE 130 - 139MM HG: CPT | Performed by: PHYSICIAN ASSISTANT

## 2023-07-11 PROCEDURE — 1036F TOBACCO NON-USER: CPT | Performed by: PHYSICIAN ASSISTANT

## 2023-07-11 PROCEDURE — 3078F DIAST BP <80 MM HG: CPT | Performed by: PHYSICIAN ASSISTANT

## 2023-07-11 RX ORDER — METHYLPREDNISOLONE ACETATE 40 MG/ML
40 INJECTION, SUSPENSION INTRA-ARTICULAR; INTRALESIONAL; INTRAMUSCULAR; SOFT TISSUE ONCE
Status: COMPLETED | OUTPATIENT
Start: 2023-07-11 | End: 2023-07-11

## 2023-07-11 RX ORDER — BUPIVACAINE HYDROCHLORIDE 2.5 MG/ML
3 INJECTION, SOLUTION EPIDURAL; INFILTRATION; INTRACAUDAL ONCE
Status: COMPLETED | OUTPATIENT
Start: 2023-07-11 | End: 2023-07-11

## 2023-07-11 RX ADMIN — BUPIVACAINE HYDROCHLORIDE 7.5 MG: 2.5 INJECTION, SOLUTION EPIDURAL; INFILTRATION; INTRACAUDAL at 16:30

## 2023-07-11 RX ADMIN — METHYLPREDNISOLONE ACETATE 40 MG: 40 INJECTION, SUSPENSION INTRA-ARTICULAR; INTRALESIONAL; INTRAMUSCULAR; SOFT TISSUE at 16:32

## 2023-07-12 ENCOUNTER — TELEPHONE (OUTPATIENT)
Dept: PAIN MANAGEMENT | Age: 58
End: 2023-07-12

## 2023-07-12 DIAGNOSIS — G89.29 CHRONIC PAIN OF LEFT KNEE: ICD-10-CM

## 2023-07-12 DIAGNOSIS — M48.062 SPINAL STENOSIS OF LUMBAR REGION WITH NEUROGENIC CLAUDICATION: ICD-10-CM

## 2023-07-12 DIAGNOSIS — M25.562 CHRONIC PAIN OF LEFT KNEE: ICD-10-CM

## 2023-07-12 DIAGNOSIS — G89.4 CHRONIC PAIN SYNDROME: ICD-10-CM

## 2023-07-12 DIAGNOSIS — M50.90 CERVICAL DISC DISORDER: ICD-10-CM

## 2023-07-12 DIAGNOSIS — M48.02 CERVICAL STENOSIS OF SPINE: ICD-10-CM

## 2023-07-12 DIAGNOSIS — M54.16 LUMBAR RADICULOPATHY: ICD-10-CM

## 2023-07-12 DIAGNOSIS — M25.512 CHRONIC LEFT SHOULDER PAIN: Primary | ICD-10-CM

## 2023-07-12 DIAGNOSIS — M54.42 CHRONIC BILATERAL LOW BACK PAIN WITH BILATERAL SCIATICA: ICD-10-CM

## 2023-07-12 DIAGNOSIS — G89.29 CHRONIC BILATERAL LOW BACK PAIN WITH BILATERAL SCIATICA: ICD-10-CM

## 2023-07-12 DIAGNOSIS — M54.12 CERVICAL RADICULOPATHY: ICD-10-CM

## 2023-07-12 DIAGNOSIS — G89.29 CHRONIC LEFT SHOULDER PAIN: Primary | ICD-10-CM

## 2023-07-12 DIAGNOSIS — M54.41 CHRONIC BILATERAL LOW BACK PAIN WITH BILATERAL SCIATICA: ICD-10-CM

## 2023-07-12 DIAGNOSIS — M19.011 PRIMARY OSTEOARTHRITIS OF RIGHT SHOULDER: ICD-10-CM

## 2023-07-12 RX ORDER — OXYCODONE HYDROCHLORIDE AND ACETAMINOPHEN 5; 325 MG/1; MG/1
1 TABLET ORAL DAILY PRN
Qty: 30 TABLET | Refills: 0 | Status: SHIPPED | OUTPATIENT
Start: 2023-07-12 | End: 2023-08-11

## 2023-07-21 ENCOUNTER — TELEPHONE (OUTPATIENT)
Dept: PAIN MANAGEMENT | Age: 58
End: 2023-07-21

## 2023-07-21 NOTE — TELEPHONE ENCOUNTER
Dorothea Pereyra called requesting a right shoulder injection. He had a left shoulder injection and would like to now get a right shoulder.  Is it ok add him on?

## 2023-07-21 NOTE — TELEPHONE ENCOUNTER
As long as he has not had a RIGHT shoulder injection in the last 3 months. I have not done one in the last 3 months but I\"m not sure if he had one at orthopedics. Please ask him to verify and if he verifies that it has been more than 3 months I can do it.     Reno Contreras, DO

## 2023-07-24 NOTE — TELEPHONE ENCOUNTER
Rona Farr scheduled, confirmed he hasn't had an injection in his right shoulder since you did the injection.

## 2023-07-26 ENCOUNTER — PROCEDURE VISIT (OUTPATIENT)
Dept: PAIN MANAGEMENT | Age: 58
End: 2023-07-26
Payer: COMMERCIAL

## 2023-07-26 VITALS
WEIGHT: 189 LBS | SYSTOLIC BLOOD PRESSURE: 122 MMHG | TEMPERATURE: 97.6 F | BODY MASS INDEX: 31.49 KG/M2 | RESPIRATION RATE: 16 BRPM | HEIGHT: 65 IN | DIASTOLIC BLOOD PRESSURE: 75 MMHG | HEART RATE: 89 BPM

## 2023-07-26 DIAGNOSIS — G89.29 CHRONIC RIGHT SHOULDER PAIN: Primary | ICD-10-CM

## 2023-07-26 DIAGNOSIS — M25.511 CHRONIC RIGHT SHOULDER PAIN: Primary | ICD-10-CM

## 2023-07-26 PROCEDURE — 20610 DRAIN/INJ JOINT/BURSA W/O US: CPT | Performed by: PAIN MEDICINE

## 2023-07-26 RX ORDER — METHYLPREDNISOLONE ACETATE 40 MG/ML
40 INJECTION, SUSPENSION INTRA-ARTICULAR; INTRALESIONAL; INTRAMUSCULAR; SOFT TISSUE ONCE
Status: COMPLETED | OUTPATIENT
Start: 2023-07-26 | End: 2023-07-26

## 2023-07-26 RX ORDER — BUPIVACAINE HYDROCHLORIDE 2.5 MG/ML
3 INJECTION, SOLUTION EPIDURAL; INFILTRATION; INTRACAUDAL ONCE
Status: COMPLETED | OUTPATIENT
Start: 2023-07-26 | End: 2023-07-26

## 2023-07-26 RX ADMIN — BUPIVACAINE HYDROCHLORIDE 3 ML: 2.5 INJECTION, SOLUTION EPIDURAL; INFILTRATION; INTRACAUDAL at 16:13

## 2023-07-26 RX ADMIN — METHYLPREDNISOLONE ACETATE 40 MG: 40 INJECTION, SUSPENSION INTRA-ARTICULAR; INTRALESIONAL; INTRAMUSCULAR; SOFT TISSUE at 16:13

## 2023-07-26 NOTE — PROGRESS NOTES
.2023    Patient: Domenic Russell  :  1965  Age:  56 y.o. Sex:  male     PRE-OPERATIVE DIAGNOSIS: Right shoulder pain, osteoarthritis. POST-OPERATIVE DIAGNOSIS: Same. PROCEDURE PERFORMED: Right shoulder steroid injection. SURGEON:   M. Nieves Gilford. ANESTHESIA: local    ESTIMATED BLOOD LOSS: None. BRIEF HISTORY: Domenic Russell comes in today for Right Shoulder steroid injection. After discussing the potential risks and benefits of the procedure with the patient. Jb did request that we proceed. A complete History & Physical was reviewed and it is unchanged. DESCRIPTION OF PROCEDURE:   The patient was placed in a seated position. The area of the Right shoulder was prepped with chloraprep and draped in a sterile manner. The overlying skin was anesthetized with ethyl chloride and then re-sterilized with alcohol wipes. Using the posterior approach, a 25 gauge 1 1/2 inch  needle was advanced  In anterolateral projection into the joint capsule. After negative aspiration a solution of 0.25 % marcaine 3 cc and 40 mg DepoMedrol was injected easily without complications. The needle was then removed and Band-Aid applied. Disposition the patient tolerated the procedure well and there were no complications . Elif Murillo will follow up in our [address] as scheduled. He was encouraged to call with questions, concerns or if worsening of symptoms occurs.     DO reji Paz

## 2023-08-06 DIAGNOSIS — M50.90 CERVICAL DISC DISORDER: ICD-10-CM

## 2023-08-06 DIAGNOSIS — M48.062 SPINAL STENOSIS OF LUMBAR REGION WITH NEUROGENIC CLAUDICATION: ICD-10-CM

## 2023-08-06 DIAGNOSIS — G89.4 CHRONIC PAIN SYNDROME: ICD-10-CM

## 2023-08-06 DIAGNOSIS — M48.02 CERVICAL STENOSIS OF SPINE: ICD-10-CM

## 2023-08-06 DIAGNOSIS — M25.511 ACUTE PAIN OF RIGHT SHOULDER: ICD-10-CM

## 2023-08-06 DIAGNOSIS — M54.42 CHRONIC BILATERAL LOW BACK PAIN WITH BILATERAL SCIATICA: ICD-10-CM

## 2023-08-06 DIAGNOSIS — M54.12 CERVICAL RADICULOPATHY: ICD-10-CM

## 2023-08-06 DIAGNOSIS — M54.16 LUMBAR RADICULOPATHY: ICD-10-CM

## 2023-08-06 DIAGNOSIS — G89.29 CHRONIC BILATERAL LOW BACK PAIN WITH BILATERAL SCIATICA: ICD-10-CM

## 2023-08-06 DIAGNOSIS — M54.41 CHRONIC BILATERAL LOW BACK PAIN WITH BILATERAL SCIATICA: ICD-10-CM

## 2023-08-21 ENCOUNTER — TELEPHONE (OUTPATIENT)
Dept: PAIN MANAGEMENT | Age: 58
End: 2023-08-21

## 2023-08-21 DIAGNOSIS — M54.41 CHRONIC BILATERAL LOW BACK PAIN WITH BILATERAL SCIATICA: Primary | ICD-10-CM

## 2023-08-21 DIAGNOSIS — M54.42 CHRONIC BILATERAL LOW BACK PAIN WITH BILATERAL SCIATICA: Primary | ICD-10-CM

## 2023-08-21 DIAGNOSIS — G89.29 CHRONIC BILATERAL LOW BACK PAIN WITH BILATERAL SCIATICA: Primary | ICD-10-CM

## 2023-08-21 RX ORDER — LIDOCAINE 36 MG/1
1 PATCH TOPICAL DAILY PRN
Qty: 30 PATCH | Refills: 2 | Status: SHIPPED | OUTPATIENT
Start: 2023-08-21 | End: 2023-09-20

## 2023-08-21 RX ORDER — LIDOCAINE 36 MG/1
1 PATCH TOPICAL DAILY PRN
Qty: 30 PATCH | Refills: 2 | Status: CANCELLED | OUTPATIENT
Start: 2023-08-21 | End: 2023-09-20

## 2023-08-21 RX ORDER — LIDOCAINE 36 MG/1
PATCH TOPICAL
Qty: 30 PATCH | Refills: 2 | OUTPATIENT
Start: 2023-08-21

## 2023-08-24 ENCOUNTER — OFFICE VISIT (OUTPATIENT)
Dept: PAIN MANAGEMENT | Age: 58
End: 2023-08-24
Payer: COMMERCIAL

## 2023-08-24 VITALS
RESPIRATION RATE: 18 BRPM | HEIGHT: 65 IN | HEART RATE: 81 BPM | OXYGEN SATURATION: 96 % | BODY MASS INDEX: 31.49 KG/M2 | WEIGHT: 189 LBS | TEMPERATURE: 97.9 F | DIASTOLIC BLOOD PRESSURE: 81 MMHG | SYSTOLIC BLOOD PRESSURE: 126 MMHG

## 2023-08-24 DIAGNOSIS — G89.29 CHRONIC PAIN OF LEFT KNEE: ICD-10-CM

## 2023-08-24 DIAGNOSIS — M25.511 CHRONIC RIGHT SHOULDER PAIN: ICD-10-CM

## 2023-08-24 DIAGNOSIS — G89.29 CHRONIC RIGHT SHOULDER PAIN: ICD-10-CM

## 2023-08-24 DIAGNOSIS — M25.562 CHRONIC PAIN OF LEFT KNEE: ICD-10-CM

## 2023-08-24 DIAGNOSIS — M54.12 CERVICAL RADICULOPATHY: ICD-10-CM

## 2023-08-24 DIAGNOSIS — M54.16 LUMBAR RADICULOPATHY: ICD-10-CM

## 2023-08-24 DIAGNOSIS — M54.41 CHRONIC BILATERAL LOW BACK PAIN WITH BILATERAL SCIATICA: ICD-10-CM

## 2023-08-24 DIAGNOSIS — M50.90 CERVICAL DISC DISORDER: Primary | ICD-10-CM

## 2023-08-24 DIAGNOSIS — G89.4 CHRONIC PAIN SYNDROME: ICD-10-CM

## 2023-08-24 DIAGNOSIS — M19.011 PRIMARY OSTEOARTHRITIS OF RIGHT SHOULDER: ICD-10-CM

## 2023-08-24 DIAGNOSIS — M48.062 SPINAL STENOSIS OF LUMBAR REGION WITH NEUROGENIC CLAUDICATION: ICD-10-CM

## 2023-08-24 DIAGNOSIS — G89.29 CHRONIC LEFT SHOULDER PAIN: ICD-10-CM

## 2023-08-24 DIAGNOSIS — M48.02 CERVICAL STENOSIS OF SPINE: ICD-10-CM

## 2023-08-24 DIAGNOSIS — M54.42 CHRONIC BILATERAL LOW BACK PAIN WITH BILATERAL SCIATICA: ICD-10-CM

## 2023-08-24 DIAGNOSIS — M25.512 CHRONIC LEFT SHOULDER PAIN: ICD-10-CM

## 2023-08-24 DIAGNOSIS — G89.29 CHRONIC BILATERAL LOW BACK PAIN WITH BILATERAL SCIATICA: ICD-10-CM

## 2023-08-24 PROCEDURE — 99213 OFFICE O/P EST LOW 20 MIN: CPT | Performed by: PHYSICIAN ASSISTANT

## 2023-08-24 PROCEDURE — G8417 CALC BMI ABV UP PARAM F/U: HCPCS | Performed by: PHYSICIAN ASSISTANT

## 2023-08-24 PROCEDURE — 1036F TOBACCO NON-USER: CPT | Performed by: PHYSICIAN ASSISTANT

## 2023-08-24 PROCEDURE — 3079F DIAST BP 80-89 MM HG: CPT | Performed by: PHYSICIAN ASSISTANT

## 2023-08-24 PROCEDURE — 3017F COLORECTAL CA SCREEN DOC REV: CPT | Performed by: PHYSICIAN ASSISTANT

## 2023-08-24 PROCEDURE — G8427 DOCREV CUR MEDS BY ELIG CLIN: HCPCS | Performed by: PHYSICIAN ASSISTANT

## 2023-08-24 PROCEDURE — 3074F SYST BP LT 130 MM HG: CPT | Performed by: PHYSICIAN ASSISTANT

## 2023-08-24 RX ORDER — OXYCODONE HYDROCHLORIDE AND ACETAMINOPHEN 5; 325 MG/1; MG/1
1 TABLET ORAL DAILY PRN
Qty: 30 TABLET | Refills: 0 | Status: SHIPPED | OUTPATIENT
Start: 2023-08-24 | End: 2023-09-23

## 2023-09-08 ENCOUNTER — TELEPHONE (OUTPATIENT)
Dept: PAIN MANAGEMENT | Age: 58
End: 2023-09-08

## 2023-09-12 DIAGNOSIS — G89.4 CHRONIC PAIN SYNDROME: ICD-10-CM

## 2023-09-12 DIAGNOSIS — M50.90 CERVICAL DISC DISORDER: ICD-10-CM

## 2023-09-12 DIAGNOSIS — G89.29 CHRONIC BILATERAL LOW BACK PAIN WITH BILATERAL SCIATICA: ICD-10-CM

## 2023-09-12 DIAGNOSIS — M54.12 CERVICAL RADICULOPATHY: ICD-10-CM

## 2023-09-12 DIAGNOSIS — M54.41 CHRONIC BILATERAL LOW BACK PAIN WITH BILATERAL SCIATICA: ICD-10-CM

## 2023-09-12 DIAGNOSIS — M54.42 CHRONIC BILATERAL LOW BACK PAIN WITH BILATERAL SCIATICA: ICD-10-CM

## 2023-09-12 DIAGNOSIS — M25.511 ACUTE PAIN OF RIGHT SHOULDER: ICD-10-CM

## 2023-09-12 DIAGNOSIS — M48.02 CERVICAL STENOSIS OF SPINE: ICD-10-CM

## 2023-09-12 DIAGNOSIS — M54.16 LUMBAR RADICULOPATHY: ICD-10-CM

## 2023-09-12 DIAGNOSIS — M48.062 SPINAL STENOSIS OF LUMBAR REGION WITH NEUROGENIC CLAUDICATION: ICD-10-CM

## 2023-10-05 ENCOUNTER — OFFICE VISIT (OUTPATIENT)
Dept: PAIN MANAGEMENT | Age: 58
End: 2023-10-05
Payer: COMMERCIAL

## 2023-10-05 VITALS
BODY MASS INDEX: 31.49 KG/M2 | WEIGHT: 189 LBS | HEART RATE: 75 BPM | SYSTOLIC BLOOD PRESSURE: 144 MMHG | OXYGEN SATURATION: 96 % | RESPIRATION RATE: 16 BRPM | DIASTOLIC BLOOD PRESSURE: 88 MMHG | HEIGHT: 65 IN

## 2023-10-05 DIAGNOSIS — M54.41 CHRONIC BILATERAL LOW BACK PAIN WITH BILATERAL SCIATICA: ICD-10-CM

## 2023-10-05 DIAGNOSIS — G89.29 CHRONIC LEFT SHOULDER PAIN: ICD-10-CM

## 2023-10-05 DIAGNOSIS — M19.011 PRIMARY OSTEOARTHRITIS OF RIGHT SHOULDER: ICD-10-CM

## 2023-10-05 DIAGNOSIS — M54.12 CERVICAL RADICULOPATHY: ICD-10-CM

## 2023-10-05 DIAGNOSIS — G89.29 CHRONIC PAIN OF LEFT KNEE: ICD-10-CM

## 2023-10-05 DIAGNOSIS — M25.512 CHRONIC LEFT SHOULDER PAIN: ICD-10-CM

## 2023-10-05 DIAGNOSIS — G89.29 CHRONIC BILATERAL LOW BACK PAIN WITH BILATERAL SCIATICA: ICD-10-CM

## 2023-10-05 DIAGNOSIS — M50.90 CERVICAL DISC DISORDER: ICD-10-CM

## 2023-10-05 DIAGNOSIS — M54.16 LUMBAR RADICULOPATHY: ICD-10-CM

## 2023-10-05 DIAGNOSIS — M25.511 CHRONIC RIGHT SHOULDER PAIN: ICD-10-CM

## 2023-10-05 DIAGNOSIS — M25.562 CHRONIC PAIN OF LEFT KNEE: ICD-10-CM

## 2023-10-05 DIAGNOSIS — G89.4 CHRONIC PAIN SYNDROME: ICD-10-CM

## 2023-10-05 DIAGNOSIS — M48.062 SPINAL STENOSIS OF LUMBAR REGION WITH NEUROGENIC CLAUDICATION: ICD-10-CM

## 2023-10-05 DIAGNOSIS — G89.29 CHRONIC RIGHT SHOULDER PAIN: ICD-10-CM

## 2023-10-05 DIAGNOSIS — M48.02 CERVICAL STENOSIS OF SPINE: ICD-10-CM

## 2023-10-05 DIAGNOSIS — M54.42 CHRONIC BILATERAL LOW BACK PAIN WITH BILATERAL SCIATICA: ICD-10-CM

## 2023-10-05 DIAGNOSIS — G89.4 CHRONIC PAIN SYNDROME: Primary | ICD-10-CM

## 2023-10-05 PROCEDURE — G8427 DOCREV CUR MEDS BY ELIG CLIN: HCPCS | Performed by: PHYSICIAN ASSISTANT

## 2023-10-05 PROCEDURE — 1036F TOBACCO NON-USER: CPT | Performed by: PHYSICIAN ASSISTANT

## 2023-10-05 PROCEDURE — 3077F SYST BP >= 140 MM HG: CPT | Performed by: PHYSICIAN ASSISTANT

## 2023-10-05 PROCEDURE — 99213 OFFICE O/P EST LOW 20 MIN: CPT | Performed by: PHYSICIAN ASSISTANT

## 2023-10-05 PROCEDURE — 3079F DIAST BP 80-89 MM HG: CPT | Performed by: PHYSICIAN ASSISTANT

## 2023-10-05 PROCEDURE — G8484 FLU IMMUNIZE NO ADMIN: HCPCS | Performed by: PHYSICIAN ASSISTANT

## 2023-10-05 PROCEDURE — 3017F COLORECTAL CA SCREEN DOC REV: CPT | Performed by: PHYSICIAN ASSISTANT

## 2023-10-05 PROCEDURE — G8417 CALC BMI ABV UP PARAM F/U: HCPCS | Performed by: PHYSICIAN ASSISTANT

## 2023-10-05 RX ORDER — OXYCODONE HYDROCHLORIDE AND ACETAMINOPHEN 5; 325 MG/1; MG/1
1 TABLET ORAL DAILY PRN
Qty: 30 TABLET | Refills: 0 | Status: SHIPPED | OUTPATIENT
Start: 2023-10-05 | End: 2023-11-04

## 2023-11-02 ENCOUNTER — OFFICE VISIT (OUTPATIENT)
Dept: PAIN MANAGEMENT | Age: 58
End: 2023-11-02
Payer: COMMERCIAL

## 2023-11-02 VITALS
TEMPERATURE: 97.6 F | RESPIRATION RATE: 16 BRPM | BODY MASS INDEX: 31.49 KG/M2 | HEART RATE: 77 BPM | WEIGHT: 189 LBS | DIASTOLIC BLOOD PRESSURE: 74 MMHG | SYSTOLIC BLOOD PRESSURE: 142 MMHG | HEIGHT: 65 IN

## 2023-11-02 DIAGNOSIS — M25.512 CHRONIC LEFT SHOULDER PAIN: ICD-10-CM

## 2023-11-02 DIAGNOSIS — M54.12 CERVICAL RADICULOPATHY: Primary | ICD-10-CM

## 2023-11-02 DIAGNOSIS — M50.90 CERVICAL DISC DISORDER: ICD-10-CM

## 2023-11-02 DIAGNOSIS — G89.29 CHRONIC LEFT SHOULDER PAIN: ICD-10-CM

## 2023-11-02 DIAGNOSIS — G89.29 CHRONIC RIGHT SHOULDER PAIN: ICD-10-CM

## 2023-11-02 DIAGNOSIS — M48.062 SPINAL STENOSIS OF LUMBAR REGION WITH NEUROGENIC CLAUDICATION: ICD-10-CM

## 2023-11-02 DIAGNOSIS — G89.29 CHRONIC BILATERAL LOW BACK PAIN WITH BILATERAL SCIATICA: ICD-10-CM

## 2023-11-02 DIAGNOSIS — G89.4 CHRONIC PAIN SYNDROME: ICD-10-CM

## 2023-11-02 DIAGNOSIS — M25.511 CHRONIC RIGHT SHOULDER PAIN: ICD-10-CM

## 2023-11-02 DIAGNOSIS — M54.12 CERVICAL RADICULOPATHY: ICD-10-CM

## 2023-11-02 DIAGNOSIS — M54.16 LUMBAR RADICULOPATHY: ICD-10-CM

## 2023-11-02 DIAGNOSIS — M19.011 PRIMARY OSTEOARTHRITIS OF RIGHT SHOULDER: ICD-10-CM

## 2023-11-02 DIAGNOSIS — G89.29 CHRONIC PAIN OF LEFT KNEE: ICD-10-CM

## 2023-11-02 DIAGNOSIS — M54.41 CHRONIC BILATERAL LOW BACK PAIN WITH BILATERAL SCIATICA: ICD-10-CM

## 2023-11-02 DIAGNOSIS — M25.562 CHRONIC PAIN OF LEFT KNEE: ICD-10-CM

## 2023-11-02 DIAGNOSIS — M54.42 CHRONIC BILATERAL LOW BACK PAIN WITH BILATERAL SCIATICA: ICD-10-CM

## 2023-11-02 DIAGNOSIS — M48.02 CERVICAL STENOSIS OF SPINE: ICD-10-CM

## 2023-11-02 PROCEDURE — G8417 CALC BMI ABV UP PARAM F/U: HCPCS | Performed by: PHYSICIAN ASSISTANT

## 2023-11-02 PROCEDURE — 99213 OFFICE O/P EST LOW 20 MIN: CPT | Performed by: PHYSICIAN ASSISTANT

## 2023-11-02 PROCEDURE — 3078F DIAST BP <80 MM HG: CPT | Performed by: PHYSICIAN ASSISTANT

## 2023-11-02 PROCEDURE — 3017F COLORECTAL CA SCREEN DOC REV: CPT | Performed by: PHYSICIAN ASSISTANT

## 2023-11-02 PROCEDURE — G8427 DOCREV CUR MEDS BY ELIG CLIN: HCPCS | Performed by: PHYSICIAN ASSISTANT

## 2023-11-02 PROCEDURE — 1036F TOBACCO NON-USER: CPT | Performed by: PHYSICIAN ASSISTANT

## 2023-11-02 PROCEDURE — 3074F SYST BP LT 130 MM HG: CPT | Performed by: PHYSICIAN ASSISTANT

## 2023-11-02 PROCEDURE — G8484 FLU IMMUNIZE NO ADMIN: HCPCS | Performed by: PHYSICIAN ASSISTANT

## 2023-11-02 RX ORDER — OXYCODONE HYDROCHLORIDE AND ACETAMINOPHEN 5; 325 MG/1; MG/1
1 TABLET ORAL DAILY PRN
Qty: 30 TABLET | Refills: 0 | Status: SHIPPED | OUTPATIENT
Start: 2023-11-04 | End: 2023-12-04

## 2023-11-14 ENCOUNTER — PROCEDURE VISIT (OUTPATIENT)
Dept: PAIN MANAGEMENT | Age: 58
End: 2023-11-14
Payer: COMMERCIAL

## 2023-11-14 VITALS
HEIGHT: 65 IN | BODY MASS INDEX: 31.49 KG/M2 | HEART RATE: 76 BPM | WEIGHT: 189 LBS | SYSTOLIC BLOOD PRESSURE: 136 MMHG | TEMPERATURE: 98.2 F | RESPIRATION RATE: 16 BRPM | DIASTOLIC BLOOD PRESSURE: 75 MMHG | OXYGEN SATURATION: 93 %

## 2023-11-14 DIAGNOSIS — M25.512 CHRONIC LEFT SHOULDER PAIN: Primary | ICD-10-CM

## 2023-11-14 DIAGNOSIS — G89.29 CHRONIC LEFT SHOULDER PAIN: Primary | ICD-10-CM

## 2023-11-14 PROCEDURE — 20610 DRAIN/INJ JOINT/BURSA W/O US: CPT | Performed by: PAIN MEDICINE

## 2023-11-14 RX ORDER — BUPIVACAINE HYDROCHLORIDE 2.5 MG/ML
3 INJECTION, SOLUTION INFILTRATION; PERINEURAL ONCE
Status: COMPLETED | OUTPATIENT
Start: 2023-11-14 | End: 2023-11-14

## 2023-11-14 RX ORDER — METHYLPREDNISOLONE ACETATE 40 MG/ML
40 INJECTION, SUSPENSION INTRA-ARTICULAR; INTRALESIONAL; INTRAMUSCULAR; SOFT TISSUE ONCE
Status: COMPLETED | OUTPATIENT
Start: 2023-11-14 | End: 2023-11-14

## 2023-11-14 RX ADMIN — BUPIVACAINE HYDROCHLORIDE 3 ML: 2.5 INJECTION, SOLUTION INFILTRATION; PERINEURAL at 16:07

## 2023-11-14 RX ADMIN — METHYLPREDNISOLONE ACETATE 40 MG: 40 INJECTION, SUSPENSION INTRA-ARTICULAR; INTRALESIONAL; INTRAMUSCULAR; INTRASYNOVIAL; SOFT TISSUE at 16:09

## 2023-11-14 NOTE — PROGRESS NOTES
.2023    Patient: Oneida Atkins  :  1965  Age:  66 y.o. Sex:  male     PRE-OPERATIVE DIAGNOSIS: Left   Shoulder pain, osteoarthritis. POST-OPERATIVE DIAGNOSIS: Same. PROCEDURE PERFORMED: Left  Shoulder (glenohumeral joint) steroid injection. SURGEON:   KIMANI Shen. ANESTHESIA: local    ESTIMATED BLOOD LOSS: None. BRIEF HISTORY: Oneida Atkins comes in today for Left  Shoulder steroid injection. After discussing the potential risks and benefits of the procedure with the patient. Jb did request that we proceed. A complete History & Physical was reviewed and it is unchanged. DESCRIPTION OF PROCEDURE:   The patient was placed in a seated position. The area of the Left  shoulder was prepped with chloraprep and draped in a sterile manner. The overlying skin was anesthetized with ethyl chloride and then re-sterilized with alcohol wipes. Using the posterior approach, a 25 gauge 1 1/2 inch  needle was advanced  In anterolateral projection into the joint capsule. After negative aspiration a solution of 0.25 % marcaine 3 cc and 40 mg DepoMedrol was injected easily without complications. The needle was then removed and Band-Aid applied. Disposition the patient tolerated the procedure well and there were no complications . Jazmín Berry will follow up in our [address] as scheduled. He was encouraged to call with questions, concerns or if worsening of symptoms occurs.     DO reji Nino

## 2023-12-08 ENCOUNTER — OFFICE VISIT (OUTPATIENT)
Dept: PAIN MANAGEMENT | Age: 58
End: 2023-12-08
Payer: COMMERCIAL

## 2023-12-08 VITALS
OXYGEN SATURATION: 96 % | DIASTOLIC BLOOD PRESSURE: 82 MMHG | HEART RATE: 88 BPM | TEMPERATURE: 98.8 F | BODY MASS INDEX: 31.49 KG/M2 | SYSTOLIC BLOOD PRESSURE: 128 MMHG | HEIGHT: 65 IN | WEIGHT: 189 LBS | RESPIRATION RATE: 18 BRPM

## 2023-12-08 DIAGNOSIS — M25.562 CHRONIC PAIN OF LEFT KNEE: ICD-10-CM

## 2023-12-08 DIAGNOSIS — G89.29 CHRONIC PAIN OF LEFT KNEE: ICD-10-CM

## 2023-12-08 DIAGNOSIS — M19.011 PRIMARY OSTEOARTHRITIS OF RIGHT SHOULDER: ICD-10-CM

## 2023-12-08 DIAGNOSIS — M54.16 LUMBAR RADICULOPATHY: ICD-10-CM

## 2023-12-08 DIAGNOSIS — M54.41 CHRONIC BILATERAL LOW BACK PAIN WITH BILATERAL SCIATICA: ICD-10-CM

## 2023-12-08 DIAGNOSIS — G89.4 CHRONIC PAIN SYNDROME: Primary | ICD-10-CM

## 2023-12-08 DIAGNOSIS — M54.12 CERVICAL RADICULOPATHY: ICD-10-CM

## 2023-12-08 DIAGNOSIS — M50.90 CERVICAL DISC DISORDER: ICD-10-CM

## 2023-12-08 DIAGNOSIS — M54.42 CHRONIC BILATERAL LOW BACK PAIN WITH BILATERAL SCIATICA: ICD-10-CM

## 2023-12-08 DIAGNOSIS — M48.062 SPINAL STENOSIS OF LUMBAR REGION WITH NEUROGENIC CLAUDICATION: ICD-10-CM

## 2023-12-08 DIAGNOSIS — G89.29 CHRONIC BILATERAL LOW BACK PAIN WITH BILATERAL SCIATICA: ICD-10-CM

## 2023-12-08 DIAGNOSIS — M48.02 CERVICAL STENOSIS OF SPINE: ICD-10-CM

## 2023-12-08 DIAGNOSIS — M25.512 CHRONIC LEFT SHOULDER PAIN: ICD-10-CM

## 2023-12-08 DIAGNOSIS — G89.29 CHRONIC LEFT SHOULDER PAIN: ICD-10-CM

## 2023-12-08 PROCEDURE — G8428 CUR MEDS NOT DOCUMENT: HCPCS | Performed by: PAIN MEDICINE

## 2023-12-08 PROCEDURE — 3074F SYST BP LT 130 MM HG: CPT | Performed by: PAIN MEDICINE

## 2023-12-08 PROCEDURE — 1036F TOBACCO NON-USER: CPT | Performed by: PAIN MEDICINE

## 2023-12-08 PROCEDURE — G8484 FLU IMMUNIZE NO ADMIN: HCPCS | Performed by: PAIN MEDICINE

## 2023-12-08 PROCEDURE — G8417 CALC BMI ABV UP PARAM F/U: HCPCS | Performed by: PAIN MEDICINE

## 2023-12-08 PROCEDURE — 3079F DIAST BP 80-89 MM HG: CPT | Performed by: PAIN MEDICINE

## 2023-12-08 PROCEDURE — 99213 OFFICE O/P EST LOW 20 MIN: CPT | Performed by: PAIN MEDICINE

## 2023-12-08 PROCEDURE — 3017F COLORECTAL CA SCREEN DOC REV: CPT | Performed by: PAIN MEDICINE

## 2023-12-08 RX ORDER — OXYCODONE HYDROCHLORIDE AND ACETAMINOPHEN 5; 325 MG/1; MG/1
1 TABLET ORAL DAILY PRN
Qty: 30 TABLET | Refills: 0 | Status: SHIPPED | OUTPATIENT
Start: 2023-12-08 | End: 2024-01-07

## 2023-12-08 RX ORDER — CHOLECALCIFEROL (VITAMIN D3) 125 MCG
1 CAPSULE ORAL DAILY
COMMUNITY
Start: 2023-10-27

## 2024-01-05 ENCOUNTER — OFFICE VISIT (OUTPATIENT)
Dept: PAIN MANAGEMENT | Age: 59
End: 2024-01-05
Payer: COMMERCIAL

## 2024-01-05 VITALS
HEIGHT: 65 IN | TEMPERATURE: 98.4 F | OXYGEN SATURATION: 93 % | HEART RATE: 82 BPM | SYSTOLIC BLOOD PRESSURE: 138 MMHG | BODY MASS INDEX: 31.48 KG/M2 | RESPIRATION RATE: 16 BRPM | WEIGHT: 188.93 LBS | DIASTOLIC BLOOD PRESSURE: 82 MMHG

## 2024-01-05 DIAGNOSIS — M54.42 CHRONIC BILATERAL LOW BACK PAIN WITH BILATERAL SCIATICA: ICD-10-CM

## 2024-01-05 DIAGNOSIS — M54.16 LUMBAR RADICULOPATHY: ICD-10-CM

## 2024-01-05 DIAGNOSIS — M48.02 CERVICAL STENOSIS OF SPINE: ICD-10-CM

## 2024-01-05 DIAGNOSIS — M54.41 CHRONIC BILATERAL LOW BACK PAIN WITH BILATERAL SCIATICA: ICD-10-CM

## 2024-01-05 DIAGNOSIS — M50.90 CERVICAL DISC DISORDER: ICD-10-CM

## 2024-01-05 DIAGNOSIS — M25.562 CHRONIC PAIN OF LEFT KNEE: ICD-10-CM

## 2024-01-05 DIAGNOSIS — M25.512 CHRONIC LEFT SHOULDER PAIN: ICD-10-CM

## 2024-01-05 DIAGNOSIS — M54.12 CERVICAL RADICULOPATHY: ICD-10-CM

## 2024-01-05 DIAGNOSIS — G89.29 CHRONIC BILATERAL LOW BACK PAIN WITH BILATERAL SCIATICA: ICD-10-CM

## 2024-01-05 DIAGNOSIS — G89.4 CHRONIC PAIN SYNDROME: Primary | ICD-10-CM

## 2024-01-05 DIAGNOSIS — M48.062 SPINAL STENOSIS OF LUMBAR REGION WITH NEUROGENIC CLAUDICATION: ICD-10-CM

## 2024-01-05 DIAGNOSIS — M19.011 PRIMARY OSTEOARTHRITIS OF RIGHT SHOULDER: ICD-10-CM

## 2024-01-05 DIAGNOSIS — G89.29 CHRONIC LEFT SHOULDER PAIN: ICD-10-CM

## 2024-01-05 DIAGNOSIS — G89.29 CHRONIC PAIN OF LEFT KNEE: ICD-10-CM

## 2024-01-05 PROCEDURE — G8417 CALC BMI ABV UP PARAM F/U: HCPCS | Performed by: PAIN MEDICINE

## 2024-01-05 PROCEDURE — 3017F COLORECTAL CA SCREEN DOC REV: CPT | Performed by: PAIN MEDICINE

## 2024-01-05 PROCEDURE — 99213 OFFICE O/P EST LOW 20 MIN: CPT | Performed by: PAIN MEDICINE

## 2024-01-05 PROCEDURE — 3075F SYST BP GE 130 - 139MM HG: CPT | Performed by: PAIN MEDICINE

## 2024-01-05 PROCEDURE — G8427 DOCREV CUR MEDS BY ELIG CLIN: HCPCS | Performed by: PAIN MEDICINE

## 2024-01-05 PROCEDURE — 1036F TOBACCO NON-USER: CPT | Performed by: PAIN MEDICINE

## 2024-01-05 PROCEDURE — 99214 OFFICE O/P EST MOD 30 MIN: CPT | Performed by: PAIN MEDICINE

## 2024-01-05 PROCEDURE — 3079F DIAST BP 80-89 MM HG: CPT | Performed by: PAIN MEDICINE

## 2024-01-05 PROCEDURE — G8484 FLU IMMUNIZE NO ADMIN: HCPCS | Performed by: PAIN MEDICINE

## 2024-01-05 RX ORDER — OXYCODONE HYDROCHLORIDE AND ACETAMINOPHEN 5; 325 MG/1; MG/1
1 TABLET ORAL DAILY PRN
Qty: 30 TABLET | Refills: 0 | Status: SHIPPED | OUTPATIENT
Start: 2024-01-07 | End: 2024-02-06

## 2024-01-05 RX ORDER — IBUPROFEN 200 MG
200 TABLET ORAL AS NEEDED
COMMUNITY

## 2024-01-05 RX ORDER — LIDOCAINE 36 MG/1
PATCH TOPICAL
COMMUNITY
Start: 2023-12-26

## 2024-01-05 NOTE — PROGRESS NOTES
Jb Easley presents to the [facility] on 1/5/2024. Jb is complaining of pain left shoulder. The pain is constant. The pain is described as aching and burning. Pain is rated on his best day at a 5, on his worst day at a 10, and on average at a 7 on the VAS scale. He took his last dose of Percocet today.     Any procedures since your last visit: No.    Pacemaker or defibrillator: No.    He is on NSAIDS and is  on anticoagulation medications to include ASA and is managed by Dr. Melendrez.     Medication Contract and Consent for Opioid Use Documents Filed       Patient Documents       Type of Document Status Date Received Received By Description    Medication Contract Received 1/24/2023  4:21 PM [facility]                    /82   Pulse 82   Temp 98.4 °F (36.9 °C) (Infrared)   Resp 16   Ht 1.651 m (5' 5\")   Wt 85.7 kg (188 lb 15 oz)   SpO2 93%   BMI 31.44 kg/m²      No LMP for male patient.  
  ibuprofen (ADVIL;MOTRIN) 200 MG tablet Take 1 tablet by mouth as needed for Pain (Patient states he only takes advil, not motrin)   Yes Yecenia Lewis MD   Cholecalciferol (VITAMIN D3) 50 MCG (2000 UT) TABS Take 1 tablet by mouth daily 10/27/23  Yes Yecenia Lewis MD   oxyCODONE-acetaminophen (PERCOCET) 5-325 MG per tablet Take 1 tablet by mouth daily as needed for Pain for up to 30 days. Take lowest dose possible to manage pain Max Daily Amount: 1 tablet 12/8/23 1/7/24 Yes Tonie Rocha DO   diclofenac sodium (VOLTAREN) 1 % GEL Apply 2 g topically 4 times daily 9/12/23  Yes Radha Mckenna PA   loratadine (CLARITIN) 10 MG capsule Take 1 capsule by mouth daily   Yes Yecenia Lewis MD   albuterol sulfate  (90 Base) MCG/ACT inhaler INHALE 2 PUFFS BY MOUTH EVERY 4 HOURS AS NEEDED FOR SHORTNESS OF BREATH 12/24/21  Yes ProviderYecenia MD   SYMBICORT 160-4.5 MCG/ACT AERO INHALE 2 PUFFS BY MOUTH TWICE DAILY 12/22/21  Yes ProviderYecenia MD   ASPIRIN LOW DOSE 81 MG EC tablet  12/11/21  Yes Yecenia Lewis MD   amLODIPine (NORVASC) 5 MG tablet TAKE 1 TABLET BY MOUTH EVERY DAY FOR BLOOD PRESSURE 12/11/21  Yes Yecenia Lewis MD   losartan-hydroCHLOROthiazide (HYZAAR) 100-25 MG per tablet TAKE 1 TABLET BY MOUTH DAILY FOR BLOOD PRESSURE 11/29/21  Yes ProviderYecenia MD   Multiple Vitamin (MULTIVITAMIN ADULT PO) Take by mouth daily   Yes ProviderYecenia MD   montelukast (SINGULAIR) 10 MG tablet TAKE 1 TABLET BY MOUTH EVERY NIGHT AT BEDTIME  Patient not taking: Reported on 1/5/2024 12/30/22   Yecenia Lewis MD   atorvastatin (LIPITOR) 10 MG tablet TAKE 1 TABLET BY MOUTH AT BEDTIME  Patient not taking: Reported on 1/5/2024 9/6/22   Yecenia Lewis MD   ibuprofen (ADVIL;MOTRIN) 800 MG tablet Take 1 tablet by mouth three times daily  Patient not taking: Reported on 1/5/2024    Yecenia Lewis MD   cetirizine (ZYRTEC) 10 MG tablet Take 1

## 2024-02-06 ENCOUNTER — OFFICE VISIT (OUTPATIENT)
Dept: PAIN MANAGEMENT | Age: 59
End: 2024-02-06
Payer: COMMERCIAL

## 2024-02-06 VITALS
DIASTOLIC BLOOD PRESSURE: 75 MMHG | BODY MASS INDEX: 31.32 KG/M2 | HEART RATE: 87 BPM | RESPIRATION RATE: 16 BRPM | TEMPERATURE: 98.2 F | SYSTOLIC BLOOD PRESSURE: 116 MMHG | HEIGHT: 65 IN | WEIGHT: 188 LBS | OXYGEN SATURATION: 96 %

## 2024-02-06 DIAGNOSIS — G89.4 CHRONIC PAIN SYNDROME: Primary | ICD-10-CM

## 2024-02-06 DIAGNOSIS — M54.16 LUMBAR RADICULOPATHY: ICD-10-CM

## 2024-02-06 DIAGNOSIS — Z79.891 ADMISSION FOR LONG-TERM OPIATE ANALGESIC USE: ICD-10-CM

## 2024-02-06 DIAGNOSIS — M25.512 CHRONIC LEFT SHOULDER PAIN: ICD-10-CM

## 2024-02-06 DIAGNOSIS — M19.011 PRIMARY OSTEOARTHRITIS OF RIGHT SHOULDER: ICD-10-CM

## 2024-02-06 DIAGNOSIS — G89.29 CHRONIC BILATERAL LOW BACK PAIN WITH BILATERAL SCIATICA: ICD-10-CM

## 2024-02-06 DIAGNOSIS — M50.90 CERVICAL DISC DISORDER: ICD-10-CM

## 2024-02-06 DIAGNOSIS — G89.29 CHRONIC PAIN OF LEFT KNEE: ICD-10-CM

## 2024-02-06 DIAGNOSIS — M54.42 CHRONIC BILATERAL LOW BACK PAIN WITH BILATERAL SCIATICA: ICD-10-CM

## 2024-02-06 DIAGNOSIS — M48.02 CERVICAL STENOSIS OF SPINE: ICD-10-CM

## 2024-02-06 DIAGNOSIS — G89.29 CHRONIC RIGHT SHOULDER PAIN: ICD-10-CM

## 2024-02-06 DIAGNOSIS — M54.41 CHRONIC BILATERAL LOW BACK PAIN WITH BILATERAL SCIATICA: ICD-10-CM

## 2024-02-06 DIAGNOSIS — G89.4 CHRONIC PAIN SYNDROME: ICD-10-CM

## 2024-02-06 DIAGNOSIS — M25.562 CHRONIC PAIN OF LEFT KNEE: ICD-10-CM

## 2024-02-06 DIAGNOSIS — M54.12 CERVICAL RADICULOPATHY: ICD-10-CM

## 2024-02-06 DIAGNOSIS — M25.511 CHRONIC RIGHT SHOULDER PAIN: ICD-10-CM

## 2024-02-06 DIAGNOSIS — M48.062 SPINAL STENOSIS OF LUMBAR REGION WITH NEUROGENIC CLAUDICATION: ICD-10-CM

## 2024-02-06 DIAGNOSIS — G89.29 CHRONIC LEFT SHOULDER PAIN: ICD-10-CM

## 2024-02-06 PROCEDURE — 99213 OFFICE O/P EST LOW 20 MIN: CPT | Performed by: PHYSICIAN ASSISTANT

## 2024-02-06 PROCEDURE — 1036F TOBACCO NON-USER: CPT | Performed by: PHYSICIAN ASSISTANT

## 2024-02-06 PROCEDURE — 3078F DIAST BP <80 MM HG: CPT | Performed by: PHYSICIAN ASSISTANT

## 2024-02-06 PROCEDURE — G8417 CALC BMI ABV UP PARAM F/U: HCPCS | Performed by: PHYSICIAN ASSISTANT

## 2024-02-06 PROCEDURE — G8484 FLU IMMUNIZE NO ADMIN: HCPCS | Performed by: PHYSICIAN ASSISTANT

## 2024-02-06 PROCEDURE — 3017F COLORECTAL CA SCREEN DOC REV: CPT | Performed by: PHYSICIAN ASSISTANT

## 2024-02-06 PROCEDURE — 3074F SYST BP LT 130 MM HG: CPT | Performed by: PHYSICIAN ASSISTANT

## 2024-02-06 PROCEDURE — G8427 DOCREV CUR MEDS BY ELIG CLIN: HCPCS | Performed by: PHYSICIAN ASSISTANT

## 2024-02-06 RX ORDER — OXYCODONE HYDROCHLORIDE AND ACETAMINOPHEN 5; 325 MG/1; MG/1
1 TABLET ORAL DAILY PRN
Qty: 30 TABLET | Refills: 0 | Status: SHIPPED | OUTPATIENT
Start: 2024-02-06 | End: 2024-03-07

## 2024-02-06 NOTE — PROGRESS NOTES
Jb Easley presents to the [facility] on 2/6/2024. Jb is complaining of pain left knee . The pain is constant. The pain is described as aching, throbbing, shooting, and stabbing. Pain is rated on his best day at a 7, on his worst day at a 10, and on average at a 8 on the VAS scale. He took his last dose of Percocet this morning .    Any procedures since your last visit: No,  He is not on NSAIDS and  is not on anticoagulation medications     Pacemaker or defibrillator: No         Medication Contract and Consent for Opioid Use Documents Filed       Patient Documents       Type of Document Status Date Received Received By Description    Medication Contract Received 1/24/2023  4:21 PM [facility]                       /75   Pulse 87   Temp 98.2 °F (36.8 °C) (Temporal)   Resp 16   Ht 1.651 m (5' 5\")   Wt 85.3 kg (188 lb)   SpO2 96%   BMI 31.28 kg/m²      No LMP for male patient.

## 2024-02-06 NOTE — PROGRESS NOTES
[facility]  [address] [place] [id number]    Follow up Note      Jb Easley     Date of Visit:  2024    CC:  Patient presents for follow up   Chief Complaint   Patient presents with    Follow-up     Left knee         HPI:    Pain is unchanged.    Appropriate analgesia with current medications regimen: yes - somewhat.  Change in quality of symptoms:no.    Medication side effects:none.   Recent diagnostic testing:none.   Recent interventional procedures: None.     He has been on anticoagulation medications to include ASA and NSAIDS and has not been on herbal supplements.  He is not diabetic.     Imagin/2022 xray rt shoulder -  FINDINGS:   Three views of the right shoulder were obtained.  There is no acute fracture   dislocation right shoulder.  There are degenerative changes of the AC joint.   The glenohumeral joint is intact and unremarkable.  There is no fracture of   the right scapula.           Impression   1. There is no fracture or dislocation of the right shoulder   2. Significant degenerative changes of the AC joint.      2022 xray lumbar f/e -      FINDINGS:   Anterolisthesis of L4 on L5 is 7 mm in flexion, 4 mm in the neutral position   and 1 mm in extension.  There is mild multilevel degenerative disc disease.   There is moderate degenerative facet arthropathy from L4-S1.           Impression   Findings of spinal instability at L4-5 as noted.  Degenerative lumbar   spondylosis.         2022 EDX -  Diagnostic Interpretation:   Normal emg of lower extremities; however, lumbar paraphasic potentials correlate with mri finding of severe stenosis such that patient is likely symptomatic from lumbar stenosis.  Clinical correlation advised.        Technologist: Norma Almanzar  Physician:Kelton Smith MD     Nerve conduction studies and electromyography were performed according to our laboratory policies and procedures which can be provided upon request. All abnormal values

## 2024-02-08 LAB
6-MAM, QUANTITATIVE, URINE: <10 NG/ML
6-MONOACETYLMORPHINE, URINE: NEGATIVE
7-AMINOCLONAZEPAM, QUANTITATIVE, URINE: <50 NG/ML
ABNORMAL SPECIMEN VALIDITY TEST: ABNORMAL
ALCOHOL URINE: NOT DETECTED MG/DL
ALPHA-HYDROXYALPRAZOLAM, QUANTITATIVE, URINE: <50 NG/ML
ALPHA-HYDROXYMIDAZOLAM, QUANTITATIVE, URINE: <50 NG/ML
ALPHA-HYDROXYTRIAZOLAM, QUANTITATIVE, URINE: <50 NG/ML
ALPRAZOLAM URINE QUANT: <50 NG/ML
AMPHETAMINE SCREEN URINE: NEGATIVE
BARBITURATE SCREEN URINE: NEGATIVE
BENZODIAZEPINE SCREEN, URINE: NEGATIVE
BUPRENORPHINE URINE: NEGATIVE
CANNABINOID SCREEN URINE: NEGATIVE
CHLORDIAZEPOXIDE, QUANTITATIVE, URINE: <50 NG/ML
CLONAZEPAM, QUANTITATIVE, URINE: <50 NG/ML
COCAINE METABOLITE, URINE: NEGATIVE
CODEINE, QUANTITATIVE, URINE: <50 NG/ML
COMPLIANCE DRUG ANALYSIS, URINE: NORMAL
DIAZEPAM URINE QUANT: <50 NG/ML
FENTANYL URINE: NEGATIVE
FLUNITRAZEPAM, QUANTITATIVE, URINE: <50 NG/ML
FLURAZEPAM, QUANTITATIVE, URINE: <50 NG/ML
HYDROCODONE, QUANTITATIVE, URINE: <50 NG/ML
HYDROMORPHONE, QUANTITATIVE, URINE: <50 NG/ML
INTEGRITY CHECK, CREATININE, URINE: 110.9 MG/DL (ref 22–250)
INTEGRITY CHECK, OXIDANT, URINE: <40 MG/L
INTEGRITY CHECK, PH, URINE: 5.3 (ref 4.5–9)
INTEGRITY CHECK, SPECIFIC GRAVITY, URINE: 1.02 (ref 1–1.03)
LORAZEPAM URINE QUANT: <50 NG/ML
METHADONE SCREEN, URINE: NEGATIVE
MIDAZOLAM URINE QUANT: <50 NG/ML
MORPHINE, QUANTITATIVE, URINE: <50 NG/ML
NORDIAZEPAM URINE QUANT: <50 NG/ML
NORHYDROCODONE, QUANTITATIVE, URINE: <50 NG/ML
NOROXYCODONE, QUANTITATIVE, URINE: 700.4 NG/ML
OPIATES, URINE: NEGATIVE
OXAZEPAM URINE QUANT: <50 NG/ML
OXYCODONE SCREEN URINE: POSITIVE
OXYCODONE URINE, QUANTITATIVE: 694.4 NG/ML
OXYMORPHONE, QUANTITATIVE, URINE: 451.3 NG/ML
PHENCYCLIDINE, URINE: NEGATIVE
TEMAZEPAM, QUANTITATIVE, URINE: <50 NG/ML
TEST INFORMATION: ABNORMAL
TRAMADOL, URINE: NEGATIVE

## 2024-02-23 ENCOUNTER — PROCEDURE VISIT (OUTPATIENT)
Dept: PAIN MANAGEMENT | Age: 59
End: 2024-02-23
Payer: COMMERCIAL

## 2024-02-23 VITALS
OXYGEN SATURATION: 97 % | DIASTOLIC BLOOD PRESSURE: 72 MMHG | RESPIRATION RATE: 16 BRPM | TEMPERATURE: 98.2 F | SYSTOLIC BLOOD PRESSURE: 115 MMHG | BODY MASS INDEX: 31.32 KG/M2 | WEIGHT: 188 LBS | HEART RATE: 79 BPM | HEIGHT: 65 IN

## 2024-02-23 DIAGNOSIS — G89.29 CHRONIC PAIN OF LEFT KNEE: Primary | ICD-10-CM

## 2024-02-23 DIAGNOSIS — M25.562 CHRONIC PAIN OF LEFT KNEE: Primary | ICD-10-CM

## 2024-02-23 PROCEDURE — 20610 DRAIN/INJ JOINT/BURSA W/O US: CPT | Performed by: PAIN MEDICINE

## 2024-02-23 RX ORDER — BUPIVACAINE HYDROCHLORIDE 2.5 MG/ML
2 INJECTION, SOLUTION EPIDURAL; INFILTRATION; INTRACAUDAL ONCE
Status: COMPLETED | OUTPATIENT
Start: 2024-02-23 | End: 2024-02-23

## 2024-02-23 RX ORDER — METHYLPREDNISOLONE ACETATE 40 MG/ML
40 INJECTION, SUSPENSION INTRA-ARTICULAR; INTRALESIONAL; INTRAMUSCULAR; SOFT TISSUE ONCE
Status: COMPLETED | OUTPATIENT
Start: 2024-02-23 | End: 2024-02-23

## 2024-02-23 RX ADMIN — METHYLPREDNISOLONE ACETATE 40 MG: 40 INJECTION, SUSPENSION INTRA-ARTICULAR; INTRALESIONAL; INTRAMUSCULAR; SOFT TISSUE at 15:46

## 2024-02-23 RX ADMIN — BUPIVACAINE HYDROCHLORIDE 5 MG: 2.5 INJECTION, SOLUTION EPIDURAL; INFILTRATION; INTRACAUDAL at 15:45

## 2024-02-23 NOTE — PROGRESS NOTES
2/23/2024    Chief Complaint   Patient presents with    Procedure     Left knee cortisone steroid injection       Allergies as of 02/23/2024    (No Known Allergies)        Procedure: Left knee cortisone steroid injection     y consent signed y procedure verified with patient  y allergies noted na pt confirms not pregnant    Patient rates pain a 10/10 on the VAS scale.        Medication:  Marcaine 0.25%    Vitals:    02/23/24 1452   BP: 115/72   Pulse: 79   Resp: 16   Temp: 98.2 °F (36.8 °C)   TempSrc: Temporal   SpO2: 97%   Weight: 85.3 kg (188 lb)   Height: 1.651 m (5' 5\")       I witnessed patient signing consent to Medical Procedure and Treatment form.     Bhumi Chaidez

## 2024-02-23 NOTE — PROGRESS NOTES
2024    Patient: Jb Easley  :  1965  Age:  58 y.o.  Sex:  male     PRE-OPERATIVE DIAGNOSIS:Left   Knee pain, osteoarthitis.    POST-OPERATIVE DIAGNOSIS: Same.    PROCEDURE PERFORMED: Left knee steroid injection.    SURGEON:   FROILAN Maria    ANESTHESIA: local with ethyl chloride spray    ESTIMATED BLOOD LOSS: None.    BRIEF HISTORY: Jb Easley comes in today for Left Knee steroid injection. After discussing the potential risks and benefits of the procedure with the patient.  Jb did request that we proceed. A complete History & Physical was reviewed and it is unchanged.    DESCRIPTION OF PROCEDURE:   The patient was placed in a seated position.   Then the targeted area lateral to the Left patellar tendon was palpated and marked and was sprayed with ethyl chloride spray.  The area of the Left knee was prepped with chloraprep and draped in a sterile manner.  A 25 gauge 1 1/2 inch  needle was advanced into the joint capsule and 2 cc's of 0.25% bupivacaine and 40 mg of DepoMedrol was injected easily without complications. The needle was then removed and Band-Aid applied.    Disposition the patient tolerated the procedure well and there were no complications .     [facility]. He was encouraged to call with questions, concerns or if worsening of symptoms occurs.    Tonie Rocha DO

## 2024-03-05 ENCOUNTER — OFFICE VISIT (OUTPATIENT)
Dept: PAIN MANAGEMENT | Age: 59
End: 2024-03-05
Payer: COMMERCIAL

## 2024-03-05 VITALS
SYSTOLIC BLOOD PRESSURE: 117 MMHG | DIASTOLIC BLOOD PRESSURE: 75 MMHG | WEIGHT: 188 LBS | HEIGHT: 65 IN | BODY MASS INDEX: 31.32 KG/M2 | RESPIRATION RATE: 16 BRPM | OXYGEN SATURATION: 94 % | HEART RATE: 82 BPM | TEMPERATURE: 97.1 F

## 2024-03-05 DIAGNOSIS — M25.512 CHRONIC LEFT SHOULDER PAIN: ICD-10-CM

## 2024-03-05 DIAGNOSIS — M54.41 CHRONIC BILATERAL LOW BACK PAIN WITH BILATERAL SCIATICA: ICD-10-CM

## 2024-03-05 DIAGNOSIS — M54.42 CHRONIC BILATERAL LOW BACK PAIN WITH BILATERAL SCIATICA: ICD-10-CM

## 2024-03-05 DIAGNOSIS — G89.4 CHRONIC PAIN SYNDROME: ICD-10-CM

## 2024-03-05 DIAGNOSIS — G89.29 CHRONIC BILATERAL LOW BACK PAIN WITH BILATERAL SCIATICA: ICD-10-CM

## 2024-03-05 DIAGNOSIS — M50.90 CERVICAL DISC DISORDER: ICD-10-CM

## 2024-03-05 DIAGNOSIS — M48.062 SPINAL STENOSIS OF LUMBAR REGION WITH NEUROGENIC CLAUDICATION: ICD-10-CM

## 2024-03-05 DIAGNOSIS — M48.02 CERVICAL STENOSIS OF SPINE: ICD-10-CM

## 2024-03-05 DIAGNOSIS — G89.29 CHRONIC LEFT SHOULDER PAIN: ICD-10-CM

## 2024-03-05 DIAGNOSIS — M54.16 LUMBAR RADICULOPATHY: ICD-10-CM

## 2024-03-05 DIAGNOSIS — G89.29 CHRONIC PAIN OF LEFT KNEE: Primary | ICD-10-CM

## 2024-03-05 DIAGNOSIS — M25.562 CHRONIC PAIN OF LEFT KNEE: Primary | ICD-10-CM

## 2024-03-05 DIAGNOSIS — G89.29 CHRONIC RIGHT SHOULDER PAIN: ICD-10-CM

## 2024-03-05 DIAGNOSIS — M54.12 CERVICAL RADICULOPATHY: ICD-10-CM

## 2024-03-05 DIAGNOSIS — M19.011 PRIMARY OSTEOARTHRITIS OF RIGHT SHOULDER: ICD-10-CM

## 2024-03-05 DIAGNOSIS — M25.511 CHRONIC RIGHT SHOULDER PAIN: ICD-10-CM

## 2024-03-05 PROCEDURE — G8417 CALC BMI ABV UP PARAM F/U: HCPCS | Performed by: PHYSICIAN ASSISTANT

## 2024-03-05 PROCEDURE — 3017F COLORECTAL CA SCREEN DOC REV: CPT | Performed by: PHYSICIAN ASSISTANT

## 2024-03-05 PROCEDURE — 3078F DIAST BP <80 MM HG: CPT | Performed by: PHYSICIAN ASSISTANT

## 2024-03-05 PROCEDURE — G8427 DOCREV CUR MEDS BY ELIG CLIN: HCPCS | Performed by: PHYSICIAN ASSISTANT

## 2024-03-05 PROCEDURE — 3074F SYST BP LT 130 MM HG: CPT | Performed by: PHYSICIAN ASSISTANT

## 2024-03-05 PROCEDURE — 1036F TOBACCO NON-USER: CPT | Performed by: PHYSICIAN ASSISTANT

## 2024-03-05 PROCEDURE — G8484 FLU IMMUNIZE NO ADMIN: HCPCS | Performed by: PHYSICIAN ASSISTANT

## 2024-03-05 PROCEDURE — 99213 OFFICE O/P EST LOW 20 MIN: CPT

## 2024-03-05 PROCEDURE — 99213 OFFICE O/P EST LOW 20 MIN: CPT | Performed by: PHYSICIAN ASSISTANT

## 2024-03-05 RX ORDER — OXYCODONE HYDROCHLORIDE AND ACETAMINOPHEN 5; 325 MG/1; MG/1
1 TABLET ORAL DAILY PRN
Qty: 30 TABLET | Refills: 0 | Status: SHIPPED | OUTPATIENT
Start: 2024-03-07 | End: 2024-04-06

## 2024-03-05 NOTE — PROGRESS NOTES
[facility]  [address] [place] [id number]    Follow up Note      Jb Easley     Date of Visit:  3/5/2024    CC:  Patient presents for follow up   Chief Complaint   Patient presents with    Follow-up     Low back pain          HPI:    Pain is better to his left knee.  Pain in right buttock is getting worse.    Appropriate analgesia with current medications regimen: yes - somewhat.  Change in quality of symptoms:no.    Medication side effects:none.   Recent diagnostic testing:none.   Recent interventional procedures:  2024 PROCEDURE PERFORMED: Left knee steroid injection. - excellent relief.    He has been on anticoagulation medications to include ASA and NSAIDS and has not been on herbal supplements.  He is not diabetic.     Imagin/2022 xray rt shoulder -  FINDINGS:   Three views of the right shoulder were obtained.  There is no acute fracture   dislocation right shoulder.  There are degenerative changes of the AC joint.   The glenohumeral joint is intact and unremarkable.  There is no fracture of   the right scapula.           Impression   1. There is no fracture or dislocation of the right shoulder   2. Significant degenerative changes of the AC joint.      2022 xray lumbar f/e -      FINDINGS:   Anterolisthesis of L4 on L5 is 7 mm in flexion, 4 mm in the neutral position   and 1 mm in extension.  There is mild multilevel degenerative disc disease.   There is moderate degenerative facet arthropathy from L4-S1.           Impression   Findings of spinal instability at L4-5 as noted.  Degenerative lumbar   spondylosis.         2022 EDX -  Diagnostic Interpretation:   Normal emg of lower extremities; however, lumbar paraphasic potentials correlate with mri finding of severe stenosis such that patient is likely symptomatic from lumbar stenosis.  Clinical correlation advised.        Technologist: Norma Almanzar  Physician:Kelton Smith MD     Nerve conduction studies and

## 2024-03-05 NOTE — PROGRESS NOTES
Jb Easley presents to the [facility] on 3/5/2024. Jb is complaining of pain in his low back. The pain is constant. The pain is described as aching, dull, and sharp. Pain is rated on his best day at a 8, on his worst day at a 10, and on average at a 9 on the VAS scale. He took his last dose of Percocet today.     Any procedures since your last visit: No    Pacemaker or defibrillator: No     He is  on NSAIDS and is  on anticoagulation medications to include ASA and is managed by Tashi Khan DO .     Medication Contract and Consent for Opioid Use Documents Filed       Patient Documents       Type of Document Status Date Received Received By Description    Medication Contract Received 1/24/2023  4:21 PM [facility]    Medication Contract Received 2/23/2024  3:32 PM TASHI PADILLA med contract                    /75   Pulse 82   Temp 97.1 °F (36.2 °C) (Infrared)   Resp 16   Ht 1.651 m (5' 5\")   Wt 85.3 kg (188 lb)   SpO2 94%   BMI 31.28 kg/m²      No LMP for male patient.

## 2024-03-06 RX ORDER — LIDOCAINE 36 MG/1
PATCH TOPICAL
Qty: 30 PATCH | OUTPATIENT
Start: 2024-03-06

## 2024-03-07 ENCOUNTER — TELEPHONE (OUTPATIENT)
Dept: PAIN MANAGEMENT | Age: 59
End: 2024-03-07

## 2024-03-07 RX ORDER — LIDOCAINE 36 MG/1
1 PATCH TOPICAL DAILY PRN
Qty: 30 PATCH | Refills: 2 | Status: SHIPPED | OUTPATIENT
Start: 2024-03-07 | End: 2024-04-06

## 2024-04-07 DIAGNOSIS — M54.41 CHRONIC BILATERAL LOW BACK PAIN WITH BILATERAL SCIATICA: ICD-10-CM

## 2024-04-07 DIAGNOSIS — G89.4 CHRONIC PAIN SYNDROME: ICD-10-CM

## 2024-04-07 DIAGNOSIS — M54.16 LUMBAR RADICULOPATHY: ICD-10-CM

## 2024-04-07 DIAGNOSIS — M54.12 CERVICAL RADICULOPATHY: ICD-10-CM

## 2024-04-07 DIAGNOSIS — M54.42 CHRONIC BILATERAL LOW BACK PAIN WITH BILATERAL SCIATICA: ICD-10-CM

## 2024-04-07 DIAGNOSIS — G89.29 CHRONIC BILATERAL LOW BACK PAIN WITH BILATERAL SCIATICA: ICD-10-CM

## 2024-04-07 DIAGNOSIS — M48.062 SPINAL STENOSIS OF LUMBAR REGION WITH NEUROGENIC CLAUDICATION: ICD-10-CM

## 2024-04-07 DIAGNOSIS — M48.02 CERVICAL STENOSIS OF SPINE: ICD-10-CM

## 2024-04-07 DIAGNOSIS — M25.511 ACUTE PAIN OF RIGHT SHOULDER: ICD-10-CM

## 2024-04-07 DIAGNOSIS — M50.90 CERVICAL DISC DISORDER: ICD-10-CM

## 2024-04-16 ENCOUNTER — OFFICE VISIT (OUTPATIENT)
Dept: PAIN MANAGEMENT | Age: 59
End: 2024-04-16
Payer: COMMERCIAL

## 2024-04-16 VITALS
TEMPERATURE: 98.6 F | OXYGEN SATURATION: 95 % | SYSTOLIC BLOOD PRESSURE: 120 MMHG | DIASTOLIC BLOOD PRESSURE: 75 MMHG | BODY MASS INDEX: 31.28 KG/M2 | WEIGHT: 188 LBS | HEART RATE: 93 BPM

## 2024-04-16 DIAGNOSIS — G89.29 CHRONIC PAIN OF LEFT KNEE: ICD-10-CM

## 2024-04-16 DIAGNOSIS — M19.011 PRIMARY OSTEOARTHRITIS OF RIGHT SHOULDER: ICD-10-CM

## 2024-04-16 DIAGNOSIS — G89.29 CHRONIC RIGHT SHOULDER PAIN: ICD-10-CM

## 2024-04-16 DIAGNOSIS — M54.16 LUMBAR RADICULOPATHY: ICD-10-CM

## 2024-04-16 DIAGNOSIS — M54.42 CHRONIC BILATERAL LOW BACK PAIN WITH BILATERAL SCIATICA: ICD-10-CM

## 2024-04-16 DIAGNOSIS — M25.511 CHRONIC RIGHT SHOULDER PAIN: ICD-10-CM

## 2024-04-16 DIAGNOSIS — M54.12 CERVICAL RADICULOPATHY: ICD-10-CM

## 2024-04-16 DIAGNOSIS — M25.562 CHRONIC PAIN OF LEFT KNEE: ICD-10-CM

## 2024-04-16 DIAGNOSIS — G89.4 CHRONIC PAIN SYNDROME: ICD-10-CM

## 2024-04-16 DIAGNOSIS — M50.90 CERVICAL DISC DISORDER: Primary | ICD-10-CM

## 2024-04-16 DIAGNOSIS — G89.29 CHRONIC BILATERAL LOW BACK PAIN WITH BILATERAL SCIATICA: ICD-10-CM

## 2024-04-16 DIAGNOSIS — M54.41 CHRONIC BILATERAL LOW BACK PAIN WITH BILATERAL SCIATICA: ICD-10-CM

## 2024-04-16 DIAGNOSIS — M48.062 SPINAL STENOSIS OF LUMBAR REGION WITH NEUROGENIC CLAUDICATION: ICD-10-CM

## 2024-04-16 DIAGNOSIS — M48.02 CERVICAL STENOSIS OF SPINE: ICD-10-CM

## 2024-04-16 PROCEDURE — 1036F TOBACCO NON-USER: CPT | Performed by: PHYSICIAN ASSISTANT

## 2024-04-16 PROCEDURE — 3074F SYST BP LT 130 MM HG: CPT | Performed by: PHYSICIAN ASSISTANT

## 2024-04-16 PROCEDURE — G8417 CALC BMI ABV UP PARAM F/U: HCPCS | Performed by: PHYSICIAN ASSISTANT

## 2024-04-16 PROCEDURE — 99213 OFFICE O/P EST LOW 20 MIN: CPT | Performed by: PHYSICIAN ASSISTANT

## 2024-04-16 PROCEDURE — 3078F DIAST BP <80 MM HG: CPT | Performed by: PHYSICIAN ASSISTANT

## 2024-04-16 PROCEDURE — G8427 DOCREV CUR MEDS BY ELIG CLIN: HCPCS | Performed by: PHYSICIAN ASSISTANT

## 2024-04-16 PROCEDURE — 99213 OFFICE O/P EST LOW 20 MIN: CPT

## 2024-04-16 PROCEDURE — 3017F COLORECTAL CA SCREEN DOC REV: CPT | Performed by: PHYSICIAN ASSISTANT

## 2024-04-16 RX ORDER — HYDROCODONE BITARTRATE AND ACETAMINOPHEN 7.5; 325 MG/1; MG/1
1 TABLET ORAL DAILY PRN
Qty: 7 TABLET | Refills: 0 | Status: SHIPPED | OUTPATIENT
Start: 2024-04-16 | End: 2024-04-23

## 2024-04-16 NOTE — PROGRESS NOTES
Jb Easley presents to the [facility] on 4/16/2024. Jb is complaining of pain bilateral shoulder and back pain.. The pain is constant. The pain is described as throbbing, shooting, and stabbing. Pain is rated on his best day at a 8, on his worst day at a 10, and on average at a 7 on the VAS scale. He took his last dose of Percocet and Motrin couple of days ago.     Any procedures since your last visit: No..    Pacemaker or defibrillator: No.     He is  on NSAIDS and is  on anticoagulation medications to include ASA and is managed by cardiology.     Medication Contract and Consent for Opioid Use Documents Filed       Patient Documents       Type of Document Status Date Received Received By Description    Medication Contract Received 1/24/2023  4:21 PM [facility]    Medication Contract Received 2/23/2024  3:32 PM T[facility] PAD[facility]A med contract                    /75   Pulse 93   Temp 98.6 °F (37 °C)   Wt 85.3 kg (188 lb)   SpO2 95%   BMI 31.28 kg/m²      No LMP for male patient.

## 2024-04-16 NOTE — PROGRESS NOTES
[facility]  [address] [place] [id number]    Follow up Note      Jb Easley     Date of Visit:  2024    CC:  Patient presents for follow up   Chief Complaint   Patient presents with    Follow-up     Lt/Rt shoulder, back is consistent.           HPI:    Pain is worse.  States percocet not helping at all.    Appropriate analgesia with current medications regimen: yes - somewhat.  Change in quality of symptoms:no.    Medication side effects:none.   Recent diagnostic testing:none.   Recent interventional procedures:  None.    He has been on anticoagulation medications to include ASA and NSAIDS and has not been on herbal supplements.  He is not diabetic.     Imagin/2022 xray rt shoulder -  FINDINGS:   Three views of the right shoulder were obtained.  There is no acute fracture   dislocation right shoulder.  There are degenerative changes of the AC joint.   The glenohumeral joint is intact and unremarkable.  There is no fracture of   the right scapula.           Impression   1. There is no fracture or dislocation of the right shoulder   2. Significant degenerative changes of the AC joint.      2022 xray lumbar f/e -      FINDINGS:   Anterolisthesis of L4 on L5 is 7 mm in flexion, 4 mm in the neutral position   and 1 mm in extension.  There is mild multilevel degenerative disc disease.   There is moderate degenerative facet arthropathy from L4-S1.           Impression   Findings of spinal instability at L4-5 as noted.  Degenerative lumbar   spondylosis.         2022 EDX -  Diagnostic Interpretation:   Normal emg of lower extremities; however, lumbar paraphasic potentials correlate with mri finding of severe stenosis such that patient is likely symptomatic from lumbar stenosis.  Clinical correlation advised.        Technologist: Norma Almanzar  Physician:Kelton Smith MD     Nerve conduction studies and electromyography were performed according to our laboratory policies and

## 2024-04-18 ENCOUNTER — TELEPHONE (OUTPATIENT)
Dept: PAIN MANAGEMENT | Age: 59
End: 2024-04-18

## 2024-04-18 NOTE — TELEPHONE ENCOUNTER
Jb Easley called he was pumping gas yesterday, and when he pulled the gas nozzle he hurt his right shoulder. He want to know if he can get an injection in his shoulder. Please advise.

## 2024-04-19 ENCOUNTER — PROCEDURE VISIT (OUTPATIENT)
Dept: PAIN MANAGEMENT | Age: 59
End: 2024-04-19
Payer: COMMERCIAL

## 2024-04-19 VITALS
HEART RATE: 84 BPM | TEMPERATURE: 97.9 F | BODY MASS INDEX: 31.33 KG/M2 | OXYGEN SATURATION: 96 % | HEIGHT: 65 IN | WEIGHT: 188.05 LBS | SYSTOLIC BLOOD PRESSURE: 127 MMHG | DIASTOLIC BLOOD PRESSURE: 85 MMHG | RESPIRATION RATE: 16 BRPM

## 2024-04-19 DIAGNOSIS — G89.29 CHRONIC RIGHT SHOULDER PAIN: Primary | ICD-10-CM

## 2024-04-19 DIAGNOSIS — M25.511 CHRONIC RIGHT SHOULDER PAIN: Primary | ICD-10-CM

## 2024-04-19 PROCEDURE — 20610 DRAIN/INJ JOINT/BURSA W/O US: CPT | Performed by: PAIN MEDICINE

## 2024-04-19 RX ORDER — BUPIVACAINE HYDROCHLORIDE 2.5 MG/ML
3 INJECTION, SOLUTION EPIDURAL; INFILTRATION; INTRACAUDAL ONCE
Status: COMPLETED | OUTPATIENT
Start: 2024-04-19 | End: 2024-04-19

## 2024-04-19 RX ORDER — METHYLPREDNISOLONE ACETATE 40 MG/ML
40 INJECTION, SUSPENSION INTRA-ARTICULAR; INTRALESIONAL; INTRAMUSCULAR; SOFT TISSUE ONCE
Status: COMPLETED | OUTPATIENT
Start: 2024-04-19 | End: 2024-04-19

## 2024-04-19 RX ADMIN — BUPIVACAINE HYDROCHLORIDE 7.5 MG: 2.5 INJECTION, SOLUTION EPIDURAL; INFILTRATION; INTRACAUDAL at 15:18

## 2024-04-19 RX ADMIN — METHYLPREDNISOLONE ACETATE 40 MG: 40 INJECTION, SUSPENSION INTRA-ARTICULAR; INTRALESIONAL; INTRAMUSCULAR; SOFT TISSUE at 15:19

## 2024-04-19 NOTE — PROGRESS NOTES
.2024    Patient: Jb Easley  :  1965  Age:  58 y.o.  Sex:  male     PRE-OPERATIVE DIAGNOSIS: Right Shoulder pain, osteoarthritis.    POST-OPERATIVE DIAGNOSIS: Same.    PROCEDURE PERFORMED: Right Shoulder (glenohumeral joint) steroid injection.    SURGEON:   KIMANI Rocha D.O.    ANESTHESIA: local    ESTIMATED BLOOD LOSS: None.    BRIEF HISTORY: Jb Easley comes in today for Right Shoulder steroid injection. After discussing the potential risks and benefits of the procedure with the patient.  Jb did request that we proceed. A complete History & Physical was reviewed and it is unchanged.    DESCRIPTION OF PROCEDURE:   The patient was placed in a seated position. The area of the Right shoulder was prepped with chloraprep and draped in a sterile manner. The overlying skin was anesthetized with ethyl chloride and then re-sterilized with alcohol wipes. Using the posterior approach, a 25 gauge 1 1/2 inch  needle was advanced  In anterolateral projection into the joint capsule. After negative aspiration a solution of 0.25 % marcaine 3 cc and 40 mg DepoMedrol was injected easily without complications. The needle was then removed and Band-Aid applied.    Disposition the patient tolerated the procedure well and there were no complications .     Jb will [facility] as [facility]. He was encouraged to call with questions, concerns or if worsening of symptoms occurs.    DO reji Ferrari

## 2024-04-19 NOTE — PROGRESS NOTES
4/19/2024    Chief Complaint   Patient presents with    Injections     Right shoulder cortisone steroid injection       Allergies as of 04/19/2024    (No Known Allergies)        Procedure: Right shoulder cortisone steroid injection     YES consent signed YES procedure verified with patient  YES allergies noted N/A pt confirms not pregnant    Patient rates pain a 10/10 on the VAS scale.    Skin Prep:  ChloraPrep    Anesthetic:  Ethyl Chloride     Medication:  Marcaine 0.25% and DepMedrol    Vitals:    04/19/24 1410   BP: 127/85   Pulse: 84   Resp: 16   Temp: 97.9 °F (36.6 °C)   TempSrc: Infrared   SpO2: 96%   Weight: 85.3 kg (188 lb 0.8 oz)   Height: 1.651 m (5' 5\")       I witnessed patient signing consent to Medical Procedure and Treatment form.     ADELA BLEVINS RN

## 2024-04-23 ENCOUNTER — TELEPHONE (OUTPATIENT)
Dept: PAIN MANAGEMENT | Age: 59
End: 2024-04-23

## 2024-04-23 DIAGNOSIS — M54.41 CHRONIC BILATERAL LOW BACK PAIN WITH BILATERAL SCIATICA: ICD-10-CM

## 2024-04-23 DIAGNOSIS — M54.12 CERVICAL RADICULOPATHY: ICD-10-CM

## 2024-04-23 DIAGNOSIS — M54.16 LUMBAR RADICULOPATHY: ICD-10-CM

## 2024-04-23 DIAGNOSIS — M48.02 CERVICAL STENOSIS OF SPINE: ICD-10-CM

## 2024-04-23 DIAGNOSIS — G89.29 CHRONIC PAIN OF LEFT KNEE: ICD-10-CM

## 2024-04-23 DIAGNOSIS — M54.42 CHRONIC BILATERAL LOW BACK PAIN WITH BILATERAL SCIATICA: ICD-10-CM

## 2024-04-23 DIAGNOSIS — M19.011 PRIMARY OSTEOARTHRITIS OF RIGHT SHOULDER: ICD-10-CM

## 2024-04-23 DIAGNOSIS — M48.062 SPINAL STENOSIS OF LUMBAR REGION WITH NEUROGENIC CLAUDICATION: ICD-10-CM

## 2024-04-23 DIAGNOSIS — G89.4 CHRONIC PAIN SYNDROME: ICD-10-CM

## 2024-04-23 DIAGNOSIS — M50.90 CERVICAL DISC DISORDER: ICD-10-CM

## 2024-04-23 DIAGNOSIS — M25.562 CHRONIC PAIN OF LEFT KNEE: ICD-10-CM

## 2024-04-23 DIAGNOSIS — G89.29 CHRONIC RIGHT SHOULDER PAIN: ICD-10-CM

## 2024-04-23 DIAGNOSIS — M25.511 CHRONIC RIGHT SHOULDER PAIN: ICD-10-CM

## 2024-04-23 DIAGNOSIS — G89.29 CHRONIC BILATERAL LOW BACK PAIN WITH BILATERAL SCIATICA: ICD-10-CM

## 2024-04-23 RX ORDER — HYDROCODONE BITARTRATE AND ACETAMINOPHEN 7.5; 325 MG/1; MG/1
1 TABLET ORAL DAILY PRN
Qty: 23 TABLET | Refills: 0 | Status: SHIPPED | OUTPATIENT
Start: 2024-04-23 | End: 2024-05-16

## 2024-05-14 ENCOUNTER — TELEPHONE (OUTPATIENT)
Dept: PAIN MANAGEMENT | Age: 59
End: 2024-05-14

## 2024-05-14 DIAGNOSIS — G89.29 CHRONIC RIGHT SHOULDER PAIN: ICD-10-CM

## 2024-05-14 DIAGNOSIS — G89.29 CHRONIC PAIN OF LEFT KNEE: ICD-10-CM

## 2024-05-14 DIAGNOSIS — M25.511 CHRONIC RIGHT SHOULDER PAIN: ICD-10-CM

## 2024-05-14 DIAGNOSIS — M54.12 CERVICAL RADICULOPATHY: ICD-10-CM

## 2024-05-14 DIAGNOSIS — M19.011 PRIMARY OSTEOARTHRITIS OF RIGHT SHOULDER: ICD-10-CM

## 2024-05-14 DIAGNOSIS — M54.41 CHRONIC BILATERAL LOW BACK PAIN WITH BILATERAL SCIATICA: ICD-10-CM

## 2024-05-14 DIAGNOSIS — G89.29 CHRONIC BILATERAL LOW BACK PAIN WITH BILATERAL SCIATICA: ICD-10-CM

## 2024-05-14 DIAGNOSIS — M50.90 CERVICAL DISC DISORDER: ICD-10-CM

## 2024-05-14 DIAGNOSIS — G89.4 CHRONIC PAIN SYNDROME: ICD-10-CM

## 2024-05-14 DIAGNOSIS — M25.562 CHRONIC PAIN OF LEFT KNEE: ICD-10-CM

## 2024-05-14 DIAGNOSIS — M54.42 CHRONIC BILATERAL LOW BACK PAIN WITH BILATERAL SCIATICA: ICD-10-CM

## 2024-05-14 DIAGNOSIS — M48.062 SPINAL STENOSIS OF LUMBAR REGION WITH NEUROGENIC CLAUDICATION: ICD-10-CM

## 2024-05-14 DIAGNOSIS — M48.02 CERVICAL STENOSIS OF SPINE: ICD-10-CM

## 2024-05-14 DIAGNOSIS — M54.16 LUMBAR RADICULOPATHY: ICD-10-CM

## 2024-05-14 RX ORDER — HYDROCODONE BITARTRATE AND ACETAMINOPHEN 7.5; 325 MG/1; MG/1
1 TABLET ORAL DAILY PRN
Qty: 6 TABLET | Refills: 0 | Status: SHIPPED
Start: 2024-05-16 | End: 2024-05-22

## 2024-05-14 NOTE — TELEPHONE ENCOUNTER
E-scribed.  Due 5/16.  Ordered enough to get to his appt.    
Patient advised  
Patient called in requesting a refill on his norco medication. Unable to run oarrs report as system is down. Patient has a follow up appointment on 5/22/24.   
Sameer Payne (spouse/Health Care Proxy (HCP)

## 2024-05-22 ENCOUNTER — PREP FOR PROCEDURE (OUTPATIENT)
Dept: PAIN MANAGEMENT | Age: 59
End: 2024-05-22

## 2024-05-22 ENCOUNTER — OFFICE VISIT (OUTPATIENT)
Dept: PAIN MANAGEMENT | Age: 59
End: 2024-05-22
Payer: COMMERCIAL

## 2024-05-22 VITALS
OXYGEN SATURATION: 92 % | DIASTOLIC BLOOD PRESSURE: 74 MMHG | HEART RATE: 90 BPM | TEMPERATURE: 97.5 F | HEIGHT: 65 IN | SYSTOLIC BLOOD PRESSURE: 136 MMHG | BODY MASS INDEX: 31.32 KG/M2 | RESPIRATION RATE: 16 BRPM | WEIGHT: 188 LBS

## 2024-05-22 DIAGNOSIS — M19.011 PRIMARY OSTEOARTHRITIS OF RIGHT SHOULDER: ICD-10-CM

## 2024-05-22 DIAGNOSIS — G89.29 CHRONIC PAIN OF LEFT KNEE: ICD-10-CM

## 2024-05-22 DIAGNOSIS — G89.29 CHRONIC LEFT SHOULDER PAIN: ICD-10-CM

## 2024-05-22 DIAGNOSIS — M48.02 CERVICAL STENOSIS OF SPINE: ICD-10-CM

## 2024-05-22 DIAGNOSIS — G89.4 CHRONIC PAIN SYNDROME: ICD-10-CM

## 2024-05-22 DIAGNOSIS — M54.16 LUMBAR RADICULOPATHY: ICD-10-CM

## 2024-05-22 DIAGNOSIS — M50.90 CERVICAL DISC DISORDER: ICD-10-CM

## 2024-05-22 DIAGNOSIS — M25.512 CHRONIC LEFT SHOULDER PAIN: ICD-10-CM

## 2024-05-22 DIAGNOSIS — M25.511 CHRONIC RIGHT SHOULDER PAIN: Primary | ICD-10-CM

## 2024-05-22 DIAGNOSIS — G89.29 CHRONIC RIGHT SHOULDER PAIN: Primary | ICD-10-CM

## 2024-05-22 DIAGNOSIS — M54.12 CERVICAL RADICULOPATHY: ICD-10-CM

## 2024-05-22 DIAGNOSIS — M25.562 CHRONIC PAIN OF LEFT KNEE: ICD-10-CM

## 2024-05-22 DIAGNOSIS — M54.42 CHRONIC BILATERAL LOW BACK PAIN WITH BILATERAL SCIATICA: ICD-10-CM

## 2024-05-22 DIAGNOSIS — G89.29 CHRONIC BILATERAL LOW BACK PAIN WITH BILATERAL SCIATICA: ICD-10-CM

## 2024-05-22 DIAGNOSIS — M48.062 SPINAL STENOSIS OF LUMBAR REGION WITH NEUROGENIC CLAUDICATION: ICD-10-CM

## 2024-05-22 DIAGNOSIS — M54.41 CHRONIC BILATERAL LOW BACK PAIN WITH BILATERAL SCIATICA: ICD-10-CM

## 2024-05-22 PROCEDURE — 1036F TOBACCO NON-USER: CPT | Performed by: PHYSICIAN ASSISTANT

## 2024-05-22 PROCEDURE — 3075F SYST BP GE 130 - 139MM HG: CPT | Performed by: PHYSICIAN ASSISTANT

## 2024-05-22 PROCEDURE — G8417 CALC BMI ABV UP PARAM F/U: HCPCS | Performed by: PHYSICIAN ASSISTANT

## 2024-05-22 PROCEDURE — 99213 OFFICE O/P EST LOW 20 MIN: CPT

## 2024-05-22 PROCEDURE — 3078F DIAST BP <80 MM HG: CPT | Performed by: PHYSICIAN ASSISTANT

## 2024-05-22 PROCEDURE — 3017F COLORECTAL CA SCREEN DOC REV: CPT | Performed by: PHYSICIAN ASSISTANT

## 2024-05-22 PROCEDURE — 99213 OFFICE O/P EST LOW 20 MIN: CPT | Performed by: PHYSICIAN ASSISTANT

## 2024-05-22 PROCEDURE — G8427 DOCREV CUR MEDS BY ELIG CLIN: HCPCS | Performed by: PHYSICIAN ASSISTANT

## 2024-05-22 RX ORDER — OXYCODONE HYDROCHLORIDE AND ACETAMINOPHEN 5; 325 MG/1; MG/1
1 TABLET ORAL DAILY PRN
Qty: 30 TABLET | Refills: 0 | Status: SHIPPED | OUTPATIENT
Start: 2024-05-22 | End: 2024-06-21

## 2024-05-22 NOTE — PROGRESS NOTES
Jb Easley presents to the [facility] on 5/22/2024. Jb is complaining of pain in his low back. The pain is constant. The pain is described as aching, dull, and tender. Pain is rated on his best day at a 10, on his worst day at a 10, and on average at a 10 on the VAS scale. He took his last dose of Norco today.     Any procedures since your last visit: No    Pacemaker or defibrillator: No     He is  on NSAIDS and is  on anticoagulation medications to include ASA and is managed by Tashi Khan DO.     Medication Contract and Consent for Opioid Use Documents Filed       Patient Documents       Type of Document Status Date Received Received By Description    Medication Contract Received 1/24/2023  4:21 PM [facility]    Medication Contract Received 2/23/2024  3:32 PM TASHI PADILLA med contract                    /74   Pulse 90   Temp 97.5 °F (36.4 °C) (Infrared)   Resp 16   Ht 1.651 m (5' 5\")   Wt 85.3 kg (188 lb)   SpO2 92%   BMI 31.28 kg/m²      No LMP for male patient.    
Lateral bending, flexion, extension rotation bilateral and is  painful.      Extremities:    Tremors:None bilaterally upper and lower  Range of motion:Generally limited extension shoulder - right, pain with internal rotation of hips not done.  Intact:Yes  Edema:Normal      Neurological:    Gait:antalgic    Dermatology:    Skin:no unusual rashes, no skin lesions, no palpable subcutaneous nodules, and good skin turgor    Impression:    Cervical pain and left upper extremity radiculopathy in C5 and C6 distribution  3/2022 cervical MRI shows herniated disc at C5-C6 there is moderate stenosis of the spinal canal with cord impingement with severe left neural foraminal stenosis  PMHx: environ allergies, COPD/asthma, HTN, CAD  LBP L>R posterior LE pain  2022 lumbar MRI shows severe L4-5 stenosis  2022 lumbar f/e films shows instability at L4-5  2022 EDX BLE shows normal but does show changes consistent with severe L4-5 stenosis  Moved her from [place] 12/2020 due to health concerns and [place] does not had an adequate health care system  Has not worked since coming to the  due to health issues  Has lumbar symptoms as well - had eval with Dr. Marmolejo      From prior Dr. Rocha visit: He describes some stool>incontinence when he gets severe \"electrical pain\", has happened approx 3 times and it last occurred 2 weeks ago, he states \"I'm not pooing on myself like a baby, only a little bit comes out\"  He reports that he is being worked up for this.  Denies any incontinence today.    Had appt with NSGY to discuss lumbar symptoms, VERNA was encouraged but he is apprensive    He is waiting until after a wedding in October to schedule any surgeries.               Plan:       Patient has seen Dr. Elias.  He will be starting with his left shoulder for surgery and then moving on to the right side.   2/8/23 L knee steroid injection with >80% relief, repeat with excellent relief.  OARRS report reviewed  Failed pregabalin 150 mg

## 2024-06-10 ENCOUNTER — TELEPHONE (OUTPATIENT)
Dept: PAIN MANAGEMENT | Age: 59
End: 2024-06-10

## 2024-06-12 ENCOUNTER — TELEPHONE (OUTPATIENT)
Dept: PAIN MANAGEMENT | Age: 59
End: 2024-06-12

## 2024-06-12 NOTE — TELEPHONE ENCOUNTER
Jb did not have the injection in 2022, he was out of town and it was never rescheduled.    He states that he cannot complete PT due to increased pain.  He states that he has muscle spasms when he is doing PT.

## 2024-06-13 ENCOUNTER — TELEPHONE (OUTPATIENT)
Dept: PAIN MANAGEMENT | Age: 59
End: 2024-06-13

## 2024-06-13 NOTE — TELEPHONE ENCOUNTER
Jb's procedure was denied by the insurance company due to lack of PT, and the denial also stated that he had the same injection in 2022, which he did not end up having.  Set up a peer to peer with Radha MARES.  It was denied, due to the lack of PT.  Called Jb and advised him of what the insurance company said, and he told me to \"Just forget about it, cancel all my future appointments\" and hung up the phone.  Will cancel all appointments at this time.

## 2024-06-27 ENCOUNTER — TELEPHONE (OUTPATIENT)
Dept: PAIN MANAGEMENT | Age: 59
End: 2024-06-27

## 2024-06-27 DIAGNOSIS — M48.02 CERVICAL STENOSIS OF SPINE: ICD-10-CM

## 2024-06-27 DIAGNOSIS — M48.062 SPINAL STENOSIS OF LUMBAR REGION WITH NEUROGENIC CLAUDICATION: ICD-10-CM

## 2024-06-27 DIAGNOSIS — G89.29 CHRONIC BILATERAL LOW BACK PAIN WITH BILATERAL SCIATICA: ICD-10-CM

## 2024-06-27 DIAGNOSIS — M54.41 CHRONIC BILATERAL LOW BACK PAIN WITH BILATERAL SCIATICA: ICD-10-CM

## 2024-06-27 DIAGNOSIS — M54.12 CERVICAL RADICULOPATHY: ICD-10-CM

## 2024-06-27 DIAGNOSIS — G89.4 CHRONIC PAIN SYNDROME: ICD-10-CM

## 2024-06-27 DIAGNOSIS — M54.42 CHRONIC BILATERAL LOW BACK PAIN WITH BILATERAL SCIATICA: ICD-10-CM

## 2024-06-27 DIAGNOSIS — M25.562 CHRONIC PAIN OF LEFT KNEE: ICD-10-CM

## 2024-06-27 DIAGNOSIS — G89.29 CHRONIC PAIN OF LEFT KNEE: ICD-10-CM

## 2024-06-27 DIAGNOSIS — M19.011 PRIMARY OSTEOARTHRITIS OF RIGHT SHOULDER: ICD-10-CM

## 2024-06-27 DIAGNOSIS — M50.90 CERVICAL DISC DISORDER: ICD-10-CM

## 2024-06-27 DIAGNOSIS — G89.29 CHRONIC LEFT SHOULDER PAIN: ICD-10-CM

## 2024-06-27 DIAGNOSIS — M54.16 LUMBAR RADICULOPATHY: ICD-10-CM

## 2024-06-27 DIAGNOSIS — M25.512 CHRONIC LEFT SHOULDER PAIN: ICD-10-CM

## 2024-06-27 RX ORDER — OXYCODONE HYDROCHLORIDE AND ACETAMINOPHEN 5; 325 MG/1; MG/1
1 TABLET ORAL DAILY PRN
Qty: 14 TABLET | Refills: 0 | Status: SHIPPED | OUTPATIENT
Start: 2024-06-27 | End: 2024-07-11

## 2024-07-11 ENCOUNTER — OFFICE VISIT (OUTPATIENT)
Dept: PAIN MANAGEMENT | Age: 59
End: 2024-07-11
Payer: COMMERCIAL

## 2024-07-11 VITALS
HEART RATE: 70 BPM | DIASTOLIC BLOOD PRESSURE: 77 MMHG | RESPIRATION RATE: 18 BRPM | BODY MASS INDEX: 31.33 KG/M2 | TEMPERATURE: 98 F | WEIGHT: 188.05 LBS | HEIGHT: 65 IN | SYSTOLIC BLOOD PRESSURE: 129 MMHG | OXYGEN SATURATION: 96 %

## 2024-07-11 DIAGNOSIS — M48.02 CERVICAL STENOSIS OF SPINE: ICD-10-CM

## 2024-07-11 DIAGNOSIS — G89.4 CHRONIC PAIN SYNDROME: ICD-10-CM

## 2024-07-11 DIAGNOSIS — M50.90 CERVICAL DISC DISORDER: ICD-10-CM

## 2024-07-11 DIAGNOSIS — G89.29 CHRONIC LEFT SHOULDER PAIN: ICD-10-CM

## 2024-07-11 DIAGNOSIS — M25.511 CHRONIC RIGHT SHOULDER PAIN: Primary | ICD-10-CM

## 2024-07-11 DIAGNOSIS — M54.12 CERVICAL RADICULOPATHY: ICD-10-CM

## 2024-07-11 DIAGNOSIS — G89.29 CHRONIC PAIN OF LEFT KNEE: ICD-10-CM

## 2024-07-11 DIAGNOSIS — M54.16 LUMBAR RADICULOPATHY: ICD-10-CM

## 2024-07-11 DIAGNOSIS — M54.41 CHRONIC BILATERAL LOW BACK PAIN WITH BILATERAL SCIATICA: ICD-10-CM

## 2024-07-11 DIAGNOSIS — M25.512 CHRONIC LEFT SHOULDER PAIN: ICD-10-CM

## 2024-07-11 DIAGNOSIS — M25.562 CHRONIC PAIN OF LEFT KNEE: ICD-10-CM

## 2024-07-11 DIAGNOSIS — G89.29 CHRONIC BILATERAL LOW BACK PAIN WITH BILATERAL SCIATICA: ICD-10-CM

## 2024-07-11 DIAGNOSIS — M54.42 CHRONIC BILATERAL LOW BACK PAIN WITH BILATERAL SCIATICA: ICD-10-CM

## 2024-07-11 DIAGNOSIS — G89.29 CHRONIC RIGHT SHOULDER PAIN: Primary | ICD-10-CM

## 2024-07-11 DIAGNOSIS — M19.011 PRIMARY OSTEOARTHRITIS OF RIGHT SHOULDER: ICD-10-CM

## 2024-07-11 DIAGNOSIS — M48.062 SPINAL STENOSIS OF LUMBAR REGION WITH NEUROGENIC CLAUDICATION: ICD-10-CM

## 2024-07-11 PROCEDURE — 3017F COLORECTAL CA SCREEN DOC REV: CPT | Performed by: PHYSICIAN ASSISTANT

## 2024-07-11 PROCEDURE — 3078F DIAST BP <80 MM HG: CPT | Performed by: PHYSICIAN ASSISTANT

## 2024-07-11 PROCEDURE — G8427 DOCREV CUR MEDS BY ELIG CLIN: HCPCS | Performed by: PHYSICIAN ASSISTANT

## 2024-07-11 PROCEDURE — 99213 OFFICE O/P EST LOW 20 MIN: CPT

## 2024-07-11 PROCEDURE — G8417 CALC BMI ABV UP PARAM F/U: HCPCS | Performed by: PHYSICIAN ASSISTANT

## 2024-07-11 PROCEDURE — 99213 OFFICE O/P EST LOW 20 MIN: CPT | Performed by: PHYSICIAN ASSISTANT

## 2024-07-11 PROCEDURE — 1036F TOBACCO NON-USER: CPT | Performed by: PHYSICIAN ASSISTANT

## 2024-07-11 PROCEDURE — 3074F SYST BP LT 130 MM HG: CPT | Performed by: PHYSICIAN ASSISTANT

## 2024-07-11 RX ORDER — FLUTICASONE PROPIONATE 50 MCG
SPRAY, SUSPENSION (ML) NASAL
COMMUNITY
Start: 2024-01-08

## 2024-07-11 RX ORDER — OXYCODONE HYDROCHLORIDE AND ACETAMINOPHEN 5; 325 MG/1; MG/1
1 TABLET ORAL DAILY PRN
Qty: 30 TABLET | Refills: 0 | Status: SHIPPED | OUTPATIENT
Start: 2024-07-11 | End: 2024-08-10

## 2024-07-11 RX ORDER — LIDOCAINE 36 MG/1
PATCH TOPICAL
COMMUNITY
Start: 2024-05-14

## 2024-07-11 NOTE — PROGRESS NOTES
Jb Easley presents to the [facility] on 7/11/2024. Jb is complaining of pain in his back. The pain is constant. The pain is described as aching, throbbing, and shooting. Pain is rated on his best day at a 5, on his worst day at a 10, and on average at a 6 on the VAS scale. He took his last dose of Percocet, Motrin, and Voltaren Gel today.     Any procedures since your last visit: No,     Pacemaker or defibrillator: No     He is not on NSAIDS and is not on anticoagulation medications to include none     Medication Contract and Consent for Opioid Use Documents Filed       Patient Documents       Type of Document Status Date Received Received By Description    Medication Contract Received 1/24/2023  4:21 PM [facility]    Medication Contract Received 2/23/2024  3:32 PM TASHI PADILLA contract                    Resp 18   Ht 1.651 m (5' 5\")   Wt 85.3 kg (188 lb 0.8 oz)   BMI 31.29 kg/m²      No LMP for male patient.    
flavum thickening is seen   with mild narrowing of the spinal canal.  There is mild right neural   foraminal narrowing due to uncovertebral arthropathy.  There is no left   neural foraminal narrowing.     C7-T1: There is moderate left neural foraminal narrowing due to   uncovertebral and facet joint arthropathy.  There is minor disc bulge   without spinal canal or right neural foraminal narrowing.       IMPRESSION   IMPRESSION:     1.  Multilevel degenerative cervical spondylosis.   2.  At C5-C6 there is moderate stenosis of the spinal canal with cord   impingement.  There is also severe left neural foraminal stenosis.   3.  Congenital narrowing of the spinal canal.   4.  Modic type I degenerative endplate change at C5-C6.   5.  Level by level description as detailed.     Anatomic Variant:  None.  Assume 7 cervical vertebrae with counting from   the craniocervical junction.      2022 cervical xray -  FINDINGS:   No fracture or malalignment.  No evidence of instability on flexion or   extension imaging.  Mild narrowing of disc space at C5/C6.  No prevertebral   soft tissue edema.           Impression   1. No evidence of fracture or malalignment.   2. No evidence of instability on flexion or extension imaging.                                         Potential Aberrant Drug-Related Behavior:  None.     Urine Drug Screenin2022 consistent   2023 consistent  2024 consistent     OARRS report:  2022 to 2024 consistent    Opioid Agreement:  2023  Updated: 2024    Past Medical History:   Diagnosis Date    Asthma     Bronchitis     COPD (chronic obstructive pulmonary disease) (HCC)     Coronary artery disease     Hypertension        History reviewed. No pertinent surgical history.    Prior to Admission medications    Medication Sig Start Date End Date Taking? Authorizing Provider   ZTLIDO 1.8 % PTCH APPLY 1 PATCH TOPICALLY TO THE SKIN DAILY AS NEEDED FOR PAIN 24  Yes Provider, MD Yecenia

## 2024-08-08 ENCOUNTER — OFFICE VISIT (OUTPATIENT)
Dept: PAIN MANAGEMENT | Age: 59
End: 2024-08-08
Payer: COMMERCIAL

## 2024-08-08 ENCOUNTER — PREP FOR PROCEDURE (OUTPATIENT)
Dept: PAIN MANAGEMENT | Age: 59
End: 2024-08-08

## 2024-08-08 VITALS
HEIGHT: 65 IN | TEMPERATURE: 97.9 F | RESPIRATION RATE: 18 BRPM | WEIGHT: 188 LBS | DIASTOLIC BLOOD PRESSURE: 76 MMHG | SYSTOLIC BLOOD PRESSURE: 124 MMHG | BODY MASS INDEX: 31.32 KG/M2 | OXYGEN SATURATION: 96 % | HEART RATE: 96 BPM

## 2024-08-08 DIAGNOSIS — G89.4 CHRONIC PAIN SYNDROME: ICD-10-CM

## 2024-08-08 DIAGNOSIS — G89.29 CHRONIC RIGHT SHOULDER PAIN: Primary | ICD-10-CM

## 2024-08-08 DIAGNOSIS — M54.42 CHRONIC BILATERAL LOW BACK PAIN WITH BILATERAL SCIATICA: ICD-10-CM

## 2024-08-08 DIAGNOSIS — M25.511 CHRONIC RIGHT SHOULDER PAIN: Primary | ICD-10-CM

## 2024-08-08 DIAGNOSIS — M25.562 CHRONIC PAIN OF LEFT KNEE: ICD-10-CM

## 2024-08-08 DIAGNOSIS — G89.29 CHRONIC BILATERAL LOW BACK PAIN WITH BILATERAL SCIATICA: ICD-10-CM

## 2024-08-08 DIAGNOSIS — M48.02 CERVICAL STENOSIS OF SPINE: ICD-10-CM

## 2024-08-08 DIAGNOSIS — M19.011 PRIMARY OSTEOARTHRITIS OF RIGHT SHOULDER: ICD-10-CM

## 2024-08-08 DIAGNOSIS — M48.062 SPINAL STENOSIS OF LUMBAR REGION WITH NEUROGENIC CLAUDICATION: ICD-10-CM

## 2024-08-08 DIAGNOSIS — M54.16 LUMBAR RADICULOPATHY: ICD-10-CM

## 2024-08-08 DIAGNOSIS — Z79.891 ADMISSION FOR LONG-TERM OPIATE ANALGESIC USE: ICD-10-CM

## 2024-08-08 DIAGNOSIS — M54.41 CHRONIC BILATERAL LOW BACK PAIN WITH BILATERAL SCIATICA: ICD-10-CM

## 2024-08-08 DIAGNOSIS — M50.90 CERVICAL DISC DISORDER: ICD-10-CM

## 2024-08-08 DIAGNOSIS — M54.12 CERVICAL RADICULOPATHY: ICD-10-CM

## 2024-08-08 DIAGNOSIS — G89.29 CHRONIC PAIN OF LEFT KNEE: ICD-10-CM

## 2024-08-08 DIAGNOSIS — M25.512 CHRONIC LEFT SHOULDER PAIN: ICD-10-CM

## 2024-08-08 DIAGNOSIS — G89.29 CHRONIC LEFT SHOULDER PAIN: ICD-10-CM

## 2024-08-08 PROCEDURE — G8417 CALC BMI ABV UP PARAM F/U: HCPCS | Performed by: PHYSICIAN ASSISTANT

## 2024-08-08 PROCEDURE — 3078F DIAST BP <80 MM HG: CPT | Performed by: PHYSICIAN ASSISTANT

## 2024-08-08 PROCEDURE — 99213 OFFICE O/P EST LOW 20 MIN: CPT | Performed by: PHYSICIAN ASSISTANT

## 2024-08-08 PROCEDURE — G8427 DOCREV CUR MEDS BY ELIG CLIN: HCPCS | Performed by: PHYSICIAN ASSISTANT

## 2024-08-08 PROCEDURE — 3074F SYST BP LT 130 MM HG: CPT | Performed by: PHYSICIAN ASSISTANT

## 2024-08-08 PROCEDURE — 1036F TOBACCO NON-USER: CPT | Performed by: PHYSICIAN ASSISTANT

## 2024-08-08 PROCEDURE — 3017F COLORECTAL CA SCREEN DOC REV: CPT | Performed by: PHYSICIAN ASSISTANT

## 2024-08-08 RX ORDER — OXYCODONE HYDROCHLORIDE AND ACETAMINOPHEN 5; 325 MG/1; MG/1
1 TABLET ORAL DAILY PRN
Qty: 30 TABLET | Refills: 0 | Status: SHIPPED | OUTPATIENT
Start: 2024-08-10 | End: 2024-09-09

## 2024-08-08 NOTE — PROGRESS NOTES
Jb Easley presents to the [facility] on 8/8/2024. Jb is complaining of pain back. The pain is constant. The pain is described as aching, throbbing, and shooting. Pain is rated on his best day at a 5, on his worst day at a 10, and on average at a 6 on the VAS scale. He took his last dose of Percocet, Ibuprofen, Voltaren Gel last night.     Any procedures since your last visit: No    Pacemaker or defibrillator: No  He is not on NSAIDS and is on anticoagulation medications, ASA, PCP  Medication Contract and Consent for Opioid Use Documents Filed       Patient Documents       Type of Document Status Date Received Received By Description    Medication Contract Received 1/24/2023  4:21 PM [facility]    Medication Contract Received 2/23/2024  3:32 PM TASHI PADILLA contract                    There were no vitals taken for this visit.     No LMP for male patient.    
only by one provider. We have discussed with the patient about age related risk factors and have thoroughly discussed the importance of taking these medications as prescribed. The patient verbalizes understanding.    ccreferring physic

## 2024-08-09 LAB
6-MAM, QUANTITATIVE, URINE: <10 NG/ML
6-MONOACETYLMORPHINE, URINE: NEGATIVE
7-AMINOCLONAZEPAM, QUANTITATIVE, URINE: <50 NG/ML
ABNORMAL SPECIMEN VALIDITY TEST: ABNORMAL
ALCOHOL URINE: NOT DETECTED MG/DL
ALPHA-HYDROXYALPRAZOLAM, QUANTITATIVE, URINE: <50 NG/ML
ALPHA-HYDROXYMIDAZOLAM, QUANTITATIVE, URINE: <50 NG/ML
ALPHA-HYDROXYTRIAZOLAM, QUANTITATIVE, URINE: <50 NG/ML
ALPRAZOLAM URINE QUANT: <50 NG/ML
AMPHETAMINE SCREEN URINE: NEGATIVE
BARBITURATE SCREEN URINE: NEGATIVE
BENZODIAZEPINE SCREEN, URINE: NEGATIVE
BUPRENORPHINE URINE: NEGATIVE
CANNABINOID SCREEN URINE: NEGATIVE
CHLORDIAZEPOXIDE, QUANTITATIVE, URINE: <50 NG/ML
CLONAZEPAM, QUANTITATIVE, URINE: <50 NG/ML
COCAINE METABOLITE, URINE: NEGATIVE
CODEINE, QUANTITATIVE, URINE: <50 NG/ML
COMPLIANCE DRUG ANALYSIS, URINE: NORMAL
DIAZEPAM URINE QUANT: <50 NG/ML
FENTANYL URINE: NEGATIVE
FLUNITRAZEPAM, QUANTITATIVE, URINE: <50 NG/ML
FLURAZEPAM, QUANTITATIVE, URINE: <50 NG/ML
HYDROCODONE, QUANTITATIVE, URINE: <50 NG/ML
HYDROMORPHONE, QUANTITATIVE, URINE: <50 NG/ML
INTEGRITY CHECK, CREATININE, URINE: 114.6 MG/DL (ref 22–250)
INTEGRITY CHECK, OXIDANT, URINE: <40 MG/L
INTEGRITY CHECK, PH, URINE: 5.2 (ref 4.5–9)
INTEGRITY CHECK, SPECIFIC GRAVITY, URINE: 1.02 (ref 1–1.03)
LORAZEPAM URINE QUANT: <50 NG/ML
METHADONE SCREEN, URINE: NEGATIVE
MIDAZOLAM URINE QUANT: <50 NG/ML
MORPHINE, QUANTITATIVE, URINE: <50 NG/ML
NORDIAZEPAM URINE QUANT: <50 NG/ML
NORHYDROCODONE, QUANTITATIVE, URINE: <50 NG/ML
NOROXYCODONE, QUANTITATIVE, URINE: 463.5 NG/ML
OPIATES, URINE: NEGATIVE
OXAZEPAM URINE QUANT: <50 NG/ML
OXYCODONE SCREEN URINE: POSITIVE
OXYCODONE URINE, QUANTITATIVE: 297.7 NG/ML
OXYMORPHONE, QUANTITATIVE, URINE: 161.1 NG/ML
PCP,URINE: NEGATIVE
TEMAZEPAM, QUANTITATIVE, URINE: <50 NG/ML
TEST INFORMATION: ABNORMAL
TRAMADOL, URINE: NEGATIVE

## 2024-08-14 ENCOUNTER — PROCEDURE VISIT (OUTPATIENT)
Dept: PAIN MANAGEMENT | Age: 59
End: 2024-08-14
Payer: COMMERCIAL

## 2024-08-14 VITALS
WEIGHT: 188.05 LBS | SYSTOLIC BLOOD PRESSURE: 134 MMHG | OXYGEN SATURATION: 93 % | BODY MASS INDEX: 31.33 KG/M2 | TEMPERATURE: 97.3 F | RESPIRATION RATE: 16 BRPM | HEART RATE: 78 BPM | HEIGHT: 65 IN | DIASTOLIC BLOOD PRESSURE: 79 MMHG

## 2024-08-14 DIAGNOSIS — M25.512 CHRONIC PAIN OF BOTH SHOULDERS: Primary | ICD-10-CM

## 2024-08-14 DIAGNOSIS — G89.29 CHRONIC PAIN OF BOTH SHOULDERS: Primary | ICD-10-CM

## 2024-08-14 DIAGNOSIS — M25.511 CHRONIC PAIN OF BOTH SHOULDERS: Primary | ICD-10-CM

## 2024-08-14 PROCEDURE — 20610 DRAIN/INJ JOINT/BURSA W/O US: CPT

## 2024-08-14 PROCEDURE — 20610 DRAIN/INJ JOINT/BURSA W/O US: CPT | Performed by: PAIN MEDICINE

## 2024-08-14 RX ORDER — METHYLPREDNISOLONE ACETATE 40 MG/ML
40 INJECTION, SUSPENSION INTRA-ARTICULAR; INTRALESIONAL; INTRAMUSCULAR; SOFT TISSUE ONCE
Status: COMPLETED | OUTPATIENT
Start: 2024-08-14 | End: 2024-08-14

## 2024-08-14 RX ORDER — BUPIVACAINE HYDROCHLORIDE 2.5 MG/ML
6 INJECTION, SOLUTION EPIDURAL; INFILTRATION; INTRACAUDAL ONCE
Status: COMPLETED | OUTPATIENT
Start: 2024-08-14 | End: 2024-08-14

## 2024-08-14 RX ADMIN — METHYLPREDNISOLONE ACETATE 40 MG: 40 INJECTION, SUSPENSION INTRA-ARTICULAR; INTRALESIONAL; INTRAMUSCULAR; SOFT TISSUE at 16:14

## 2024-08-14 RX ADMIN — BUPIVACAINE HYDROCHLORIDE 15 MG: 2.5 INJECTION, SOLUTION EPIDURAL; INFILTRATION; INTRACAUDAL at 15:44

## 2024-08-14 RX ADMIN — METHYLPREDNISOLONE ACETATE 40 MG: 40 INJECTION, SUSPENSION INTRA-ARTICULAR; INTRALESIONAL; INTRAMUSCULAR; SOFT TISSUE at 16:13

## 2024-08-14 NOTE — PROGRESS NOTES
.2024    Patient: Jb Easley  :  1965  Age:  58 y.o.  Sex:  male     PRE-OPERATIVE DIAGNOSIS: Bilateral Shoulder pain, osteoarthritis.    POST-OPERATIVE DIAGNOSIS: Same.    PROCEDURE PERFORMED: Bilateral Shoulder (glenohumeral joint) steroid injection.    SURGEON:   KIMANI Rocha D.O.    ANESTHESIA: local    ESTIMATED BLOOD LOSS: None.    BRIEF HISTORY: Jb Easley comes in today for Bilateral Shoulder steroid injection. After discussing the potential risks and benefits of the procedure with the patient.  Jb did request that we proceed. A complete History & Physical was reviewed and it is unchanged.    DESCRIPTION OF PROCEDURE:   The patient was placed in a seated position. The area of the Right shoulder was prepped with chloraprep and draped in a sterile manner. The overlying skin was anesthetized with ethyl chloride and then re-sterilized with alcohol wipes. Using the posterior approach, a 25 gauge 1 1/2 inch  needle was advanced  In anterolateral projection into the joint capsule. After negative aspiration a solution of 0.25 % marcaine 3 cc and 40 mg DepoMedrol was injected easily without complications. The needle was then removed and Band-Aid applied. The same procedure was completed on the other side.    Disposition the patient tolerated the procedure well and there were no complications .     Jb will follow up in our comprehensive [facility] as scheduled. He was encouraged to call with questions, concerns or if worsening of symptoms occurs.    DO reji Ferrari

## 2024-08-14 NOTE — PROGRESS NOTES
8/14/2024    Chief Complaint   Patient presents with    Injections     Bilateral shoulder cortisone steroid injections       Allergies as of 08/14/2024    (No Known Allergies)        Procedure: Bilateral shoulder cortisone steroid injections     YES consent signed YES procedure verified with patient  YES allergies noted N/A pt confirms not pregnant    Patient rates pain a 10/10 on the VAS scale.    Skin Prep:  ChloraPrep    Anesthetic:  Ethyl Chloride     Medication:  Marcaine 0.25% and DepMedrol    Vitals:    08/14/24 1347   BP: 134/79   Pulse: 78   Resp: 16   Temp: 97.3 °F (36.3 °C)   TempSrc: Infrared   SpO2: 93%   Weight: 85.3 kg (188 lb 0.8 oz)   Height: 1.651 m (5' 5\")       I witnessed patient signing consent to Medical Procedure and Treatment form.     ADELA BLEVINS RN

## 2024-09-13 ENCOUNTER — TELEPHONE (OUTPATIENT)
Dept: PAIN MANAGEMENT | Age: 59
End: 2024-09-13

## 2024-09-13 DIAGNOSIS — M25.512 CHRONIC LEFT SHOULDER PAIN: ICD-10-CM

## 2024-09-13 DIAGNOSIS — G89.29 CHRONIC BILATERAL LOW BACK PAIN WITH BILATERAL SCIATICA: ICD-10-CM

## 2024-09-13 DIAGNOSIS — M48.062 SPINAL STENOSIS OF LUMBAR REGION WITH NEUROGENIC CLAUDICATION: ICD-10-CM

## 2024-09-13 DIAGNOSIS — M54.41 CHRONIC BILATERAL LOW BACK PAIN WITH BILATERAL SCIATICA: ICD-10-CM

## 2024-09-13 DIAGNOSIS — M48.02 CERVICAL STENOSIS OF SPINE: ICD-10-CM

## 2024-09-13 DIAGNOSIS — G89.29 CHRONIC PAIN OF LEFT KNEE: ICD-10-CM

## 2024-09-13 DIAGNOSIS — M54.42 CHRONIC BILATERAL LOW BACK PAIN WITH BILATERAL SCIATICA: ICD-10-CM

## 2024-09-13 DIAGNOSIS — G89.29 CHRONIC LEFT SHOULDER PAIN: ICD-10-CM

## 2024-09-13 DIAGNOSIS — M19.011 PRIMARY OSTEOARTHRITIS OF RIGHT SHOULDER: ICD-10-CM

## 2024-09-13 DIAGNOSIS — M54.12 CERVICAL RADICULOPATHY: ICD-10-CM

## 2024-09-13 DIAGNOSIS — M54.16 LUMBAR RADICULOPATHY: ICD-10-CM

## 2024-09-13 DIAGNOSIS — M50.90 CERVICAL DISC DISORDER: ICD-10-CM

## 2024-09-13 DIAGNOSIS — M25.562 CHRONIC PAIN OF LEFT KNEE: ICD-10-CM

## 2024-09-13 DIAGNOSIS — G89.4 CHRONIC PAIN SYNDROME: ICD-10-CM

## 2024-09-13 RX ORDER — OXYCODONE AND ACETAMINOPHEN 5; 325 MG/1; MG/1
1 TABLET ORAL DAILY PRN
Qty: 12 TABLET | Refills: 0 | Status: SHIPPED | OUTPATIENT
Start: 2024-09-13 | End: 2024-09-25

## 2024-09-13 NOTE — TELEPHONE ENCOUNTER
Jb Easley called into the office requesting a refill of Percocet. His next office visit is scheduled for 9/25/24.

## 2024-09-25 ENCOUNTER — OFFICE VISIT (OUTPATIENT)
Dept: PAIN MANAGEMENT | Age: 59
End: 2024-09-25
Payer: COMMERCIAL

## 2024-09-25 VITALS
DIASTOLIC BLOOD PRESSURE: 64 MMHG | TEMPERATURE: 97.3 F | BODY MASS INDEX: 31.32 KG/M2 | WEIGHT: 188 LBS | SYSTOLIC BLOOD PRESSURE: 119 MMHG | OXYGEN SATURATION: 97 % | HEART RATE: 86 BPM | RESPIRATION RATE: 16 BRPM | HEIGHT: 65 IN

## 2024-09-25 DIAGNOSIS — G89.29 CHRONIC PAIN OF LEFT KNEE: ICD-10-CM

## 2024-09-25 DIAGNOSIS — M25.511 CHRONIC PAIN OF BOTH SHOULDERS: Primary | ICD-10-CM

## 2024-09-25 DIAGNOSIS — M54.42 CHRONIC BILATERAL LOW BACK PAIN WITH BILATERAL SCIATICA: ICD-10-CM

## 2024-09-25 DIAGNOSIS — M48.02 CERVICAL STENOSIS OF SPINE: ICD-10-CM

## 2024-09-25 DIAGNOSIS — M19.011 PRIMARY OSTEOARTHRITIS OF RIGHT SHOULDER: ICD-10-CM

## 2024-09-25 DIAGNOSIS — G89.29 CHRONIC LEFT SHOULDER PAIN: ICD-10-CM

## 2024-09-25 DIAGNOSIS — G89.29 CHRONIC RIGHT SHOULDER PAIN: ICD-10-CM

## 2024-09-25 DIAGNOSIS — M25.511 CHRONIC RIGHT SHOULDER PAIN: ICD-10-CM

## 2024-09-25 DIAGNOSIS — M48.062 SPINAL STENOSIS OF LUMBAR REGION WITH NEUROGENIC CLAUDICATION: ICD-10-CM

## 2024-09-25 DIAGNOSIS — M25.512 CHRONIC LEFT SHOULDER PAIN: ICD-10-CM

## 2024-09-25 DIAGNOSIS — M54.41 CHRONIC BILATERAL LOW BACK PAIN WITH BILATERAL SCIATICA: ICD-10-CM

## 2024-09-25 DIAGNOSIS — G89.29 CHRONIC BILATERAL LOW BACK PAIN WITH BILATERAL SCIATICA: ICD-10-CM

## 2024-09-25 DIAGNOSIS — G89.4 CHRONIC PAIN SYNDROME: ICD-10-CM

## 2024-09-25 DIAGNOSIS — M25.562 CHRONIC PAIN OF LEFT KNEE: ICD-10-CM

## 2024-09-25 DIAGNOSIS — M25.512 CHRONIC PAIN OF BOTH SHOULDERS: Primary | ICD-10-CM

## 2024-09-25 DIAGNOSIS — M54.16 LUMBAR RADICULOPATHY: ICD-10-CM

## 2024-09-25 DIAGNOSIS — M50.90 CERVICAL DISC DISORDER: ICD-10-CM

## 2024-09-25 DIAGNOSIS — G89.29 CHRONIC PAIN OF BOTH SHOULDERS: Primary | ICD-10-CM

## 2024-09-25 DIAGNOSIS — M54.12 CERVICAL RADICULOPATHY: ICD-10-CM

## 2024-09-25 PROCEDURE — 3078F DIAST BP <80 MM HG: CPT | Performed by: PHYSICIAN ASSISTANT

## 2024-09-25 PROCEDURE — 99213 OFFICE O/P EST LOW 20 MIN: CPT | Performed by: PHYSICIAN ASSISTANT

## 2024-09-25 PROCEDURE — G8427 DOCREV CUR MEDS BY ELIG CLIN: HCPCS | Performed by: PHYSICIAN ASSISTANT

## 2024-09-25 PROCEDURE — 3074F SYST BP LT 130 MM HG: CPT | Performed by: PHYSICIAN ASSISTANT

## 2024-09-25 PROCEDURE — G8417 CALC BMI ABV UP PARAM F/U: HCPCS | Performed by: PHYSICIAN ASSISTANT

## 2024-09-25 PROCEDURE — 3017F COLORECTAL CA SCREEN DOC REV: CPT | Performed by: PHYSICIAN ASSISTANT

## 2024-09-25 PROCEDURE — 99213 OFFICE O/P EST LOW 20 MIN: CPT

## 2024-09-25 PROCEDURE — 1036F TOBACCO NON-USER: CPT | Performed by: PHYSICIAN ASSISTANT

## 2024-09-25 RX ORDER — OXYCODONE AND ACETAMINOPHEN 5; 325 MG/1; MG/1
1 TABLET ORAL DAILY PRN
Qty: 30 TABLET | Refills: 0 | Status: SHIPPED | OUTPATIENT
Start: 2024-09-25 | End: 2024-10-25

## 2024-10-09 ENCOUNTER — HOSPITAL ENCOUNTER (EMERGENCY)
Age: 59
Discharge: HOME | End: 2024-10-09
Attending: STUDENT IN AN ORGANIZED HEALTH CARE EDUCATION/TRAINING PROGRAM | Admitting: FAMILY MEDICINE
Payer: COMMERCIAL

## 2024-10-09 ENCOUNTER — APPOINTMENT (OUTPATIENT)
Dept: GENERAL RADIOLOGY | Age: 59
End: 2024-10-09
Payer: COMMERCIAL

## 2024-10-09 VITALS
OXYGEN SATURATION: 95 % | TEMPERATURE: 98 F | RESPIRATION RATE: 18 BRPM | SYSTOLIC BLOOD PRESSURE: 142 MMHG | HEART RATE: 89 BPM | DIASTOLIC BLOOD PRESSURE: 87 MMHG

## 2024-10-09 DIAGNOSIS — R07.9 CHEST PAIN, UNSPECIFIED TYPE: Primary | ICD-10-CM

## 2024-10-09 LAB
ALBUMIN SERPL-MCNC: 4.4 G/DL (ref 3.5–5.2)
ALP SERPL-CCNC: 54 U/L (ref 40–129)
ALT SERPL-CCNC: 77 U/L (ref 0–40)
ANION GAP SERPL CALCULATED.3IONS-SCNC: 13 MMOL/L (ref 7–16)
AST SERPL-CCNC: 47 U/L (ref 0–39)
BASOPHILS # BLD: 0.06 K/UL (ref 0–0.2)
BASOPHILS NFR BLD: 1 % (ref 0–2)
BILIRUB SERPL-MCNC: 0.7 MG/DL (ref 0–1.2)
BNP SERPL-MCNC: <36 PG/ML (ref 0–125)
BUN SERPL-MCNC: 20 MG/DL (ref 6–20)
CALCIUM SERPL-MCNC: 9.9 MG/DL (ref 8.6–10.2)
CHLORIDE SERPL-SCNC: 100 MMOL/L (ref 98–107)
CO2 SERPL-SCNC: 24 MMOL/L (ref 22–29)
CREAT SERPL-MCNC: 0.8 MG/DL (ref 0.7–1.2)
EKG ATRIAL RATE: 82 BPM
EKG P AXIS: 67 DEGREES
EKG P-R INTERVAL: 168 MS
EKG Q-T INTERVAL: 370 MS
EKG QRS DURATION: 110 MS
EKG QTC CALCULATION (BAZETT): 432 MS
EKG R AXIS: 35 DEGREES
EKG T AXIS: 71 DEGREES
EKG VENTRICULAR RATE: 82 BPM
EOSINOPHIL # BLD: 0.49 K/UL (ref 0.05–0.5)
EOSINOPHILS RELATIVE PERCENT: 7 % (ref 0–6)
ERYTHROCYTE [DISTWIDTH] IN BLOOD BY AUTOMATED COUNT: 12.5 % (ref 11.5–15)
GFR, ESTIMATED: >90 ML/MIN/1.73M2
GLUCOSE SERPL-MCNC: 106 MG/DL (ref 74–99)
HCT VFR BLD AUTO: 44.3 % (ref 37–54)
HGB BLD-MCNC: 15.3 G/DL (ref 12.5–16.5)
IMM GRANULOCYTES # BLD AUTO: <0.03 K/UL (ref 0–0.58)
IMM GRANULOCYTES NFR BLD: 0 % (ref 0–5)
LYMPHOCYTES NFR BLD: 2.35 K/UL (ref 1.5–4)
LYMPHOCYTES RELATIVE PERCENT: 32 % (ref 20–42)
MAGNESIUM SERPL-MCNC: 2 MG/DL (ref 1.6–2.6)
MCH RBC QN AUTO: 31.4 PG (ref 26–35)
MCHC RBC AUTO-ENTMCNC: 34.5 G/DL (ref 32–34.5)
MCV RBC AUTO: 90.8 FL (ref 80–99.9)
MONOCYTES NFR BLD: 0.71 K/UL (ref 0.1–0.95)
MONOCYTES NFR BLD: 10 % (ref 2–12)
NEUTROPHILS NFR BLD: 51 % (ref 43–80)
NEUTS SEG NFR BLD: 3.72 K/UL (ref 1.8–7.3)
PLATELET # BLD AUTO: 249 K/UL (ref 130–450)
PMV BLD AUTO: 10.3 FL (ref 7–12)
POTASSIUM SERPL-SCNC: 3.4 MMOL/L (ref 3.5–5)
PROT SERPL-MCNC: 7.4 G/DL (ref 6.4–8.3)
RBC # BLD AUTO: 4.88 M/UL (ref 3.8–5.8)
SODIUM SERPL-SCNC: 137 MMOL/L (ref 132–146)
TROPONIN I SERPL HS-MCNC: 18 NG/L (ref 0–11)
TROPONIN I SERPL HS-MCNC: 20 NG/L (ref 0–11)
WBC OTHER # BLD: 7.4 K/UL (ref 4.5–11.5)

## 2024-10-09 PROCEDURE — 93005 ELECTROCARDIOGRAM TRACING: CPT

## 2024-10-09 PROCEDURE — 93010 ELECTROCARDIOGRAM REPORT: CPT | Performed by: INTERNAL MEDICINE

## 2024-10-09 PROCEDURE — 80053 COMPREHEN METABOLIC PANEL: CPT

## 2024-10-09 PROCEDURE — G0378 HOSPITAL OBSERVATION PER HR: HCPCS

## 2024-10-09 PROCEDURE — 6370000000 HC RX 637 (ALT 250 FOR IP)

## 2024-10-09 PROCEDURE — 99285 EMERGENCY DEPT VISIT HI MDM: CPT

## 2024-10-09 PROCEDURE — 83880 ASSAY OF NATRIURETIC PEPTIDE: CPT

## 2024-10-09 PROCEDURE — 71045 X-RAY EXAM CHEST 1 VIEW: CPT

## 2024-10-09 PROCEDURE — 84484 ASSAY OF TROPONIN QUANT: CPT

## 2024-10-09 PROCEDURE — 83735 ASSAY OF MAGNESIUM: CPT

## 2024-10-09 PROCEDURE — 85025 COMPLETE CBC W/AUTO DIFF WBC: CPT

## 2024-10-09 RX ORDER — NITROGLYCERIN 0.4 MG/1
0.4 TABLET SUBLINGUAL ONCE
Status: COMPLETED | OUTPATIENT
Start: 2024-10-09 | End: 2024-10-09

## 2024-10-09 RX ORDER — ASPIRIN 81 MG/1
324 TABLET, CHEWABLE ORAL ONCE
Status: DISCONTINUED | OUTPATIENT
Start: 2024-10-09 | End: 2024-10-09 | Stop reason: HOSPADM

## 2024-10-09 RX ADMIN — NITROGLYCERIN 0.4 MG: 0.4 TABLET, ORALLY DISINTEGRATING SUBLINGUAL at 12:27

## 2024-10-09 ASSESSMENT — PAIN SCALES - GENERAL: PAINLEVEL_OUTOF10: 0

## 2024-10-09 ASSESSMENT — PAIN DESCRIPTION - DESCRIPTORS: DESCRIPTORS: PRESSURE

## 2024-10-09 ASSESSMENT — LIFESTYLE VARIABLES
HOW OFTEN DO YOU HAVE A DRINK CONTAINING ALCOHOL: NEVER
HOW MANY STANDARD DRINKS CONTAINING ALCOHOL DO YOU HAVE ON A TYPICAL DAY: PATIENT DOES NOT DRINK

## 2024-10-09 ASSESSMENT — PAIN DESCRIPTION - LOCATION: LOCATION: CHEST

## 2024-10-09 ASSESSMENT — PAIN DESCRIPTION - ORIENTATION: ORIENTATION: LEFT;UPPER

## 2024-10-09 NOTE — CONSULTS
Discussed with ED physician regarding patient admission for chest pain, during my interview with patient, patient however declined admission and would like to go home and follow outpatient with his cardiologist.  He reported that he is symptom-free.  Patient understand risks of leaving his medical advice which includes possible worsening of his condition and possible death.  Patient left AGAINST MEDICAL ADVICE

## 2024-10-09 NOTE — ED PROVIDER NOTES
[facility] EMERGENCY DEPARTMENT  EMERGENCY DEPARTMENT ENCOUNTER        Pt Name: Jb Easley  MRN: [id number]  Birthdate 1965  Date of evaluation: 10/9/2024  Provider: Betito Hu DO  PCP: Rich Khan DO  Note Started: 12:04 PM EDT 10/9/24    CHIEF COMPLAINT       Chief Complaint   Patient presents with    Chest Pain     Patient states he is having left sided chest pain and shortness of breath. Patient states he was seen at urgent care this AM and had abnormal EKG and was sent to ER    Shortness of Breath       HISTORY OF PRESENT ILLNESS: 1 or more Elements   History From: patient    Limitations to history : None    Jb Easley is a 59 y.o. male who presents to the emergency department for left-sided chest pain that started last night while he is sitting at the coffee table.  History of high blood pressure, high cholesterol, prediabetic, and significant family history with multiple deaths in his family including mother, father, and sister.  Sister had an MI at the age of 46.  He follows with [facility] cardiology several years ago but never followed up with his yearly appointments.  He describes the pain as a dull sensation to his left chest that is nonradiating. Patient denies fever, chills, headache, shortness of breath, abdominal pain, nausea, vomiting, diarrhea.    Nursing Notes were all reviewed and agreed with or any disagreements were addressed in the HPI.        REVIEW OF SYSTEMS :           Positives and Pertinent negatives as per HPI.     SURGICAL HISTORY   No past surgical history on file.    CURRENTMEDICATIONS       Previous Medications    ALBUTEROL SULFATE  (90 BASE) MCG/ACT INHALER    INHALE 2 PUFFS BY MOUTH EVERY 4 HOURS AS NEEDED FOR SHORTNESS OF BREATH    AMLODIPINE (NORVASC) 5 MG TABLET    TAKE 1 TABLET BY MOUTH EVERY DAY FOR BLOOD PRESSURE    ASPIRIN LOW DOSE 81 MG EC TABLET        ATORVASTATIN (LIPITOR) 10 MG TABLET        
limits   COMPREHENSIVE METABOLIC PANEL W/ REFLEX TO MG FOR LOW K - Abnormal; Notable for the following components:    Potassium 3.4 (*)     Glucose 106 (*)     ALT 77 (*)     AST 47 (*)     All other components within normal limits   TROPONIN - Abnormal; Notable for the following components:    Troponin, High Sensitivity 20 (*)     All other components within normal limits   BRAIN NATRIURETIC PEPTIDE   MAGNESIUM       As interpreted by me, the above displayed labs are abnormal. All other labs obtained during this visit were within normal range or not returned as of this dictation.    RADIOLOGY:   Non-plain film images such as CT, Ultrasound and MRI are read by the radiologist. Plain radiographic images are visualized and preliminarily interpreted by the ED Provider with the findings documented in the ED Course.    Interpretation per the Radiologist below, if available at the time of this note:    XR CHEST PORTABLE   Final Result   No acute cardiopulmonary process.           No results found.    No results found.    PROCEDURES   Unless otherwise noted below, none       CRITICAL CARE TIME (.cct)   none    PAST MEDICAL HISTORY/Chronic Conditions Affecting Care      has a past medical history of Asthma, Bronchitis, COPD (chronic obstructive pulmonary disease) (HCC), Coronary artery disease, and Hypertension.     EMERGENCY DEPARTMENT COURSE    Vitals:    Vitals:    10/09/24 1108 10/09/24 1117 10/09/24 1330 10/09/24 1351   BP:  (!) 164/92 129/77    Pulse: 73  73 73   Resp:  17 19    Temp: 98 °F (36.7 °C)      SpO2: 98%  97%        Patient was given the following medications:  Medications   aspirin chewable tablet 324 mg (has no administration in time range)   potassium bicarbonate (EFFER-K/K-LYTE) disintegrating tablet 25 mEq (has no administration in time range)   nitroGLYCERIN (NITROSTAT) SL tablet 0.4 mg (0.4 mg SubLINGual Given 10/9/24 1227)         Is this patient to be included in the SEP-1 Core Measure due to

## 2024-10-29 ENCOUNTER — OFFICE VISIT (OUTPATIENT)
Dept: CARDIOLOGY CLINIC | Age: 59
End: 2024-10-29
Payer: COMMERCIAL

## 2024-10-29 VITALS
HEART RATE: 81 BPM | DIASTOLIC BLOOD PRESSURE: 86 MMHG | BODY MASS INDEX: 32.76 KG/M2 | SYSTOLIC BLOOD PRESSURE: 153 MMHG | RESPIRATION RATE: 18 BRPM | WEIGHT: 196.6 LBS | HEIGHT: 65 IN

## 2024-10-29 DIAGNOSIS — R07.9 CHEST PAIN, UNSPECIFIED TYPE: Primary | ICD-10-CM

## 2024-10-29 PROBLEM — R07.2 PRECORDIAL PAIN: Status: RESOLVED | Noted: 2021-12-30 | Resolved: 2024-10-29

## 2024-10-29 PROBLEM — M54.41 CHRONIC BILATERAL LOW BACK PAIN WITH BILATERAL SCIATICA: Status: RESOLVED | Noted: 2022-08-02 | Resolved: 2024-10-29

## 2024-10-29 PROBLEM — M48.02 CERVICAL STENOSIS OF SPINE: Status: RESOLVED | Noted: 2022-04-26 | Resolved: 2024-10-29

## 2024-10-29 PROBLEM — G89.4 CHRONIC PAIN SYNDROME: Status: RESOLVED | Noted: 2022-04-26 | Resolved: 2024-10-29

## 2024-10-29 PROBLEM — M54.12 CERVICAL RADICULOPATHY: Status: RESOLVED | Noted: 2022-04-26 | Resolved: 2024-10-29

## 2024-10-29 PROBLEM — M54.42 CHRONIC BILATERAL LOW BACK PAIN WITH BILATERAL SCIATICA: Status: RESOLVED | Noted: 2022-08-02 | Resolved: 2024-10-29

## 2024-10-29 PROBLEM — G89.29 CHRONIC LEFT SHOULDER PAIN: Status: RESOLVED | Noted: 2023-03-17 | Resolved: 2024-10-29

## 2024-10-29 PROBLEM — G89.29 CHRONIC BILATERAL LOW BACK PAIN WITH BILATERAL SCIATICA: Status: RESOLVED | Noted: 2022-08-02 | Resolved: 2024-10-29

## 2024-10-29 PROBLEM — M25.511 ACUTE PAIN OF RIGHT SHOULDER: Status: RESOLVED | Noted: 2022-05-31 | Resolved: 2024-10-29

## 2024-10-29 PROBLEM — M25.512 CHRONIC LEFT SHOULDER PAIN: Status: RESOLVED | Noted: 2023-03-17 | Resolved: 2024-10-29

## 2024-10-29 PROCEDURE — 3017F COLORECTAL CA SCREEN DOC REV: CPT | Performed by: INTERNAL MEDICINE

## 2024-10-29 PROCEDURE — G8427 DOCREV CUR MEDS BY ELIG CLIN: HCPCS | Performed by: INTERNAL MEDICINE

## 2024-10-29 PROCEDURE — 99213 OFFICE O/P EST LOW 20 MIN: CPT | Performed by: INTERNAL MEDICINE

## 2024-10-29 PROCEDURE — 93000 ELECTROCARDIOGRAM COMPLETE: CPT | Performed by: INTERNAL MEDICINE

## 2024-10-29 PROCEDURE — 1036F TOBACCO NON-USER: CPT | Performed by: INTERNAL MEDICINE

## 2024-10-29 PROCEDURE — G8417 CALC BMI ABV UP PARAM F/U: HCPCS | Performed by: INTERNAL MEDICINE

## 2024-10-29 PROCEDURE — 3077F SYST BP >= 140 MM HG: CPT | Performed by: INTERNAL MEDICINE

## 2024-10-29 PROCEDURE — 3079F DIAST BP 80-89 MM HG: CPT | Performed by: INTERNAL MEDICINE

## 2024-10-29 PROCEDURE — G8484 FLU IMMUNIZE NO ADMIN: HCPCS | Performed by: INTERNAL MEDICINE

## 2024-10-29 RX ORDER — OXYCODONE AND ACETAMINOPHEN 5; 325 MG/1; MG/1
1 TABLET ORAL EVERY 4 HOURS PRN
COMMUNITY
End: 2024-10-29 | Stop reason: CLARIF

## 2024-10-29 NOTE — PROGRESS NOTES
(196 lb 9.6 oz)   BMI 32.72 kg/m²   Constitutional: Oriented to person, place, and time. Obese No distress.    Neck: No JVD present. Carotid bruit is not present.   Cardiovascular: Normal rate, regular rhythm, normal heart sounds and intact distal pulses.  No gallop and no friction rub.  No murmur heard.  Pulmonary/Chest: Effort normal and breath sounds normal.   Abdominal: Soft. Bowel sounds are normal. No distension and no mass.   Musculoskeletal: No edema   Neurological: Alert and oriented to person, place, and time.   Skin: Skin is warm and dry.   Psychiatric: Normal mood and affect. Behavior is normal.     EKG:  normal EKG, normal sinus rhythm, unchanged from previous tracings.    ASSESSMENT AND PLAN:  Patient Active Problem List   Diagnosis    HTN (hypertension)    Cervical disc disorder    Lumbar radiculopathy    Spinal stenosis of lumbar region with neurogenic claudication    Chest pain     Non cardiac complaints, resolved with GI directed therapy and dietary changes  Negative ED w/u  Normal functional capacity, EKG and CV exam  Discussion of long term management of coronary risk, MI or death, including weight reduction, aerobic exercise of minimum 150 min/week, control of BP, DM and cholesterol per AHA guidelines, and avoidance of cigarettes and/or marijuana    No testing needed          Brad Melendrez M.D  [facility]

## 2024-11-06 ENCOUNTER — OFFICE VISIT (OUTPATIENT)
Dept: PAIN MANAGEMENT | Age: 59
End: 2024-11-06
Payer: COMMERCIAL

## 2024-11-06 ENCOUNTER — TELEPHONE (OUTPATIENT)
Dept: PAIN MANAGEMENT | Age: 59
End: 2024-11-06

## 2024-11-06 VITALS
HEART RATE: 75 BPM | TEMPERATURE: 97 F | HEIGHT: 65 IN | SYSTOLIC BLOOD PRESSURE: 133 MMHG | DIASTOLIC BLOOD PRESSURE: 80 MMHG | BODY MASS INDEX: 32.65 KG/M2 | RESPIRATION RATE: 16 BRPM | OXYGEN SATURATION: 96 % | WEIGHT: 196 LBS

## 2024-11-06 DIAGNOSIS — M19.011 PRIMARY OSTEOARTHRITIS OF RIGHT SHOULDER: ICD-10-CM

## 2024-11-06 DIAGNOSIS — M54.42 CHRONIC BILATERAL LOW BACK PAIN WITH BILATERAL SCIATICA: ICD-10-CM

## 2024-11-06 DIAGNOSIS — G89.29 CHRONIC BILATERAL LOW BACK PAIN WITH BILATERAL SCIATICA: ICD-10-CM

## 2024-11-06 DIAGNOSIS — M25.511 CHRONIC PAIN OF BOTH SHOULDERS: Primary | ICD-10-CM

## 2024-11-06 DIAGNOSIS — M25.562 CHRONIC PAIN OF LEFT KNEE: ICD-10-CM

## 2024-11-06 DIAGNOSIS — G89.29 CHRONIC RIGHT SHOULDER PAIN: ICD-10-CM

## 2024-11-06 DIAGNOSIS — M54.12 CERVICAL RADICULOPATHY: ICD-10-CM

## 2024-11-06 DIAGNOSIS — M50.90 CERVICAL DISC DISORDER: ICD-10-CM

## 2024-11-06 DIAGNOSIS — M25.512 CHRONIC PAIN OF BOTH SHOULDERS: Primary | ICD-10-CM

## 2024-11-06 DIAGNOSIS — G89.29 CHRONIC LEFT SHOULDER PAIN: ICD-10-CM

## 2024-11-06 DIAGNOSIS — M54.16 LUMBAR RADICULOPATHY: ICD-10-CM

## 2024-11-06 DIAGNOSIS — G89.29 CHRONIC PAIN OF LEFT KNEE: ICD-10-CM

## 2024-11-06 DIAGNOSIS — M25.511 CHRONIC RIGHT SHOULDER PAIN: ICD-10-CM

## 2024-11-06 DIAGNOSIS — M48.02 CERVICAL STENOSIS OF SPINE: ICD-10-CM

## 2024-11-06 DIAGNOSIS — M25.512 CHRONIC LEFT SHOULDER PAIN: ICD-10-CM

## 2024-11-06 DIAGNOSIS — G89.4 CHRONIC PAIN SYNDROME: ICD-10-CM

## 2024-11-06 DIAGNOSIS — G89.29 CHRONIC PAIN OF BOTH SHOULDERS: Primary | ICD-10-CM

## 2024-11-06 DIAGNOSIS — M54.41 CHRONIC BILATERAL LOW BACK PAIN WITH BILATERAL SCIATICA: ICD-10-CM

## 2024-11-06 PROCEDURE — G8484 FLU IMMUNIZE NO ADMIN: HCPCS | Performed by: PHYSICIAN ASSISTANT

## 2024-11-06 PROCEDURE — 99213 OFFICE O/P EST LOW 20 MIN: CPT | Performed by: PHYSICIAN ASSISTANT

## 2024-11-06 PROCEDURE — G8417 CALC BMI ABV UP PARAM F/U: HCPCS | Performed by: PHYSICIAN ASSISTANT

## 2024-11-06 PROCEDURE — 99213 OFFICE O/P EST LOW 20 MIN: CPT

## 2024-11-06 PROCEDURE — G8427 DOCREV CUR MEDS BY ELIG CLIN: HCPCS | Performed by: PHYSICIAN ASSISTANT

## 2024-11-06 PROCEDURE — 3079F DIAST BP 80-89 MM HG: CPT | Performed by: PHYSICIAN ASSISTANT

## 2024-11-06 PROCEDURE — 3075F SYST BP GE 130 - 139MM HG: CPT | Performed by: PHYSICIAN ASSISTANT

## 2024-11-06 PROCEDURE — 1036F TOBACCO NON-USER: CPT | Performed by: PHYSICIAN ASSISTANT

## 2024-11-06 PROCEDURE — 3017F COLORECTAL CA SCREEN DOC REV: CPT | Performed by: PHYSICIAN ASSISTANT

## 2024-11-06 RX ORDER — OXYCODONE HYDROCHLORIDE 5 MG/1
5 TABLET ORAL DAILY PRN
Qty: 30 TABLET | Refills: 0 | Status: SHIPPED | OUTPATIENT
Start: 2024-11-06 | End: 2024-12-06

## 2024-11-06 NOTE — PROGRESS NOTES
Jb Easley presents to the [facility] on 11/6/2024. Jb is complaining of pain in shoulders. The pain is constant. The pain is described as throbbing. Pain is rated on his best day at a 7, on his worst day at a 10, and on average at a 9 on the VAS scale. He took his last dose of Percocet today.     Any procedures since your last visit: No    Pacemaker or defibrillator: No managed by na.    He is not on NSAIDS and is not on anticoagulation medications to include none and is managed by na.     Medication Contract and Consent for Opioid Use Documents Filed       Patient Documents       Type of Document Status Date Received Received By Description    Medication Contract Received 1/24/2023  4:21 PM [facility]    Medication Contract Received 2/23/2024  3:32 PM TASHI PADILLA med contract                    /80   Pulse 75   Temp 97 °F (36.1 °C)   Resp 16   Ht 1.651 m (5' 5\")   Wt 88.9 kg (196 lb)   SpO2 96%   BMI 32.62 kg/m²      No LMP for male patient.

## 2024-11-06 NOTE — TELEPHONE ENCOUNTER
Jb is scheduled for a Lumbar Transforaminal Epidural Steroid Injection with Dr. Rocha on 11/14/2024. Would it be acceptable for patient to hold Aspirin 7 days prior to procedure? Please advise. Thank you

## 2024-11-06 NOTE — TELEPHONE ENCOUNTER
Call to Jb Easley that procedure was scheduled for 11/14/2024 and that [facility] should call him a few days before for the pre op call and between 2:00 PM and 4:00 PM  the business day before with the arrival time. Instructed Jb to hold ibuprofen for 24 hours, Celebrex, Mobic, and naprosyn for 4 days and any aspirin containing products, CoQ 10, or fish oil for 7 days. Instructed to call office back if any questions. Jb verbalized understanding.    Electronically signed by Marilee Pryor RN on 11/6/2024 at 12:59 PM

## 2024-11-06 NOTE — PROGRESS NOTES
[facility]  [address] [place] [id number]    Follow up Note      Jb Easley     Date of Visit:  2024    CC:  Patient presents for follow up   Chief Complaint   Patient presents with    Follow-up     Follow up to epidural         HPI:    Pain is worse to right shoulder after sleeping on it all night.   Appropriate analgesia with current medications regimen: yes - somewhat.  Change in quality of symptoms:no.    Medication side effects:none.   Recent diagnostic testing:none.   Recent interventional procedures:  None.    He has been on anticoagulation medications to include ASA and NSAIDS and has not been on herbal supplements.  He is not diabetic.     Imagin/2022 xray rt shoulder -  FINDINGS:   Three views of the right shoulder were obtained.  There is no acute fracture   dislocation right shoulder.  There are degenerative changes of the AC joint.   The glenohumeral joint is intact and unremarkable.  There is no fracture of   the right scapula.           Impression   1. There is no fracture or dislocation of the right shoulder   2. Significant degenerative changes of the AC joint.      2022 xray lumbar f/e -      FINDINGS:   Anterolisthesis of L4 on L5 is 7 mm in flexion, 4 mm in the neutral position   and 1 mm in extension.  There is mild multilevel degenerative disc disease.   There is moderate degenerative facet arthropathy from L4-S1.           Impression   Findings of spinal instability at L4-5 as noted.  Degenerative lumbar   spondylosis.         2022 EDX -  Diagnostic Interpretation:   Normal emg of lower extremities; however, lumbar paraphasic potentials correlate with mri finding of severe stenosis such that patient is likely symptomatic from lumbar stenosis.  Clinical correlation advised.        Technologist: Norma Almanzar  Physician:Kelton Smith MD     Nerve conduction studies and electromyography were performed according to our laboratory policies and procedures

## 2024-11-12 NOTE — PROGRESS NOTES
[facility] PAIN MANAGEMENT  INSTRUCTIONS  ...........................................................................................................................................    [x] Parking the day of Surgery is located in the Main Entrance lot.  Upon entering at Entrance A, make immediate right into the surgery reception room    [x]  Bring photo ID and insurance card    [x] You may have a light breakfast day of procedure    [x]  Wear loose comfortable clothing    [x]  Please follow instructions for medications as given per Dr's office    [x] You can expect a call the business day prior to procedure to notify you of your arrival time     [x] Please arrange for     []  Other instructions

## 2024-11-14 ENCOUNTER — HOSPITAL ENCOUNTER (OUTPATIENT)
Age: 59
Setting detail: OUTPATIENT SURGERY
Discharge: HOME | End: 2024-11-14
Attending: PAIN MEDICINE | Admitting: PAIN MEDICINE
Payer: COMMERCIAL

## 2024-11-14 ENCOUNTER — HOSPITAL ENCOUNTER (OUTPATIENT)
Dept: GENERAL RADIOLOGY | Age: 59
Setting detail: OUTPATIENT SURGERY
Discharge: HOME | End: 2024-11-16
Attending: PAIN MEDICINE
Payer: COMMERCIAL

## 2024-11-14 VITALS
RESPIRATION RATE: 18 BRPM | HEIGHT: 65 IN | TEMPERATURE: 97 F | OXYGEN SATURATION: 95 % | HEART RATE: 80 BPM | SYSTOLIC BLOOD PRESSURE: 136 MMHG | BODY MASS INDEX: 32.49 KG/M2 | WEIGHT: 195 LBS | DIASTOLIC BLOOD PRESSURE: 67 MMHG

## 2024-11-14 DIAGNOSIS — R52 PAIN MANAGEMENT: ICD-10-CM

## 2024-11-14 LAB — GLUCOSE BLD-MCNC: 145 MG/DL (ref 74–99)

## 2024-11-14 PROCEDURE — 6360000004 HC RX CONTRAST MEDICATION: Performed by: PAIN MEDICINE

## 2024-11-14 PROCEDURE — 2500000003 HC RX 250 WO HCPCS: Performed by: PAIN MEDICINE

## 2024-11-14 PROCEDURE — 64483 NJX AA&/STRD TFRM EPI L/S 1: CPT | Performed by: PAIN MEDICINE

## 2024-11-14 PROCEDURE — 7100000010 HC PHASE II RECOVERY - FIRST 15 MIN: Performed by: PAIN MEDICINE

## 2024-11-14 PROCEDURE — 3600000002 HC SURGERY LEVEL 2 BASE: Performed by: PAIN MEDICINE

## 2024-11-14 PROCEDURE — 2709999900 HC NON-CHARGEABLE SUPPLY: Performed by: PAIN MEDICINE

## 2024-11-14 PROCEDURE — 82962 GLUCOSE BLOOD TEST: CPT

## 2024-11-14 PROCEDURE — 7100000011 HC PHASE II RECOVERY - ADDTL 15 MIN: Performed by: PAIN MEDICINE

## 2024-11-14 PROCEDURE — 6360000002 HC RX W HCPCS: Performed by: PAIN MEDICINE

## 2024-11-14 RX ORDER — LIDOCAINE HYDROCHLORIDE 5 MG/ML
INJECTION, SOLUTION INFILTRATION; INTRAVENOUS PRN
Status: DISCONTINUED | OUTPATIENT
Start: 2024-11-14 | End: 2024-11-14 | Stop reason: ALTCHOICE

## 2024-11-14 RX ORDER — METHYLPREDNISOLONE ACETATE 40 MG/ML
INJECTION, SUSPENSION INTRA-ARTICULAR; INTRALESIONAL; INTRAMUSCULAR; SOFT TISSUE PRN
Status: DISCONTINUED | OUTPATIENT
Start: 2024-11-14 | End: 2024-11-14 | Stop reason: ALTCHOICE

## 2024-11-14 RX ORDER — IOPAMIDOL 612 MG/ML
INJECTION, SOLUTION INTRATHECAL PRN
Status: DISCONTINUED | OUTPATIENT
Start: 2024-11-14 | End: 2024-11-14 | Stop reason: ALTCHOICE

## 2024-11-14 ASSESSMENT — PAIN DESCRIPTION - LOCATION: LOCATION: BACK;LEG

## 2024-11-14 ASSESSMENT — PAIN SCALES - GENERAL: PAINLEVEL_OUTOF10: 8

## 2024-11-14 ASSESSMENT — PAIN DESCRIPTION - PAIN TYPE: TYPE: CHRONIC PAIN

## 2024-11-14 ASSESSMENT — PAIN DESCRIPTION - DESCRIPTORS: DESCRIPTORS: ACHING

## 2024-11-14 NOTE — OP NOTE
lateral fluoroscopic view was then used to place the needle tip in the middle to anterior aspect of the foramen. Negative aspiration was confirmed for blood and CSF and 1 cc of Isovue-M 300 contrast was injected at each level under live AP fluoroscopy. Appropriate neurograms were observed under live AP fluoroscopy. Then after negative aspiration, a solution of the 2 cc of 0.5% lidocaine and 40 mg DepoMedrol was easily injected between each level. The needles were removed with constant aspiration technique. The patient's back was cleaned and a bandage was placed over the needle insertion points    Disposition the patient tolerated the procedure well and there were no complications . Vital signs remained stable throughout the procedure. The patient was escorted to the recovery area where they remained until discharge and written discharge instructions for the procedure were given.    Plan: Jb will return to our pain management center as scheduled.     Tonie Rocha DO

## 2024-11-14 NOTE — DISCHARGE INSTRUCTIONS
[facility] Pain Management Department  [place] [phone]  Dr. Josep Rocha   Post-Pain Block/Radiofrequency  Home Going Instructions    1-Go home, rest for the remainder of the day  2-Please do not lift over 20 pounds the day of the injection  3-If you received sedation No: alcohol, driving, operating lawn mowers, plows, tractors or other dangerous equipment until next morning. Do not make important decisions or sign legal documents for 24 hours. You may experience light headedness, dizziness, nausea or sleepiness after sedation. Do not stay alone. A responsible adult must be with you for 24 hours. You could be nauseated from the medications you have received. Your IV site may be sore and bruised.    4-No dietary restrictions     5-Resume all medications the same day, blood thinners to be resumed 24 hours after injection if you were instructed to stop any.    6-Keep the surgical site clean and dry, you may shower the next morning and remove the      dressing.     7- No sitz baths, tub baths or hot tubs/swimming for 24 hours.       8- If you have any pain at the injection site(s), application of an ice pack to the area should be       helpful, 20 minutes on/20 minutes off for next 48 hours.  9- Call [facility] immediately at if you develop.  Fever greater than 100.4 F  Have bleeding or drainage from the puncture site  Have progressive Leg/arm numbness and or weakness  Loss of control of bowel and or bladder (wet/soil yourself)  Severe headache with inability to lift head  10-You may return to work the next day

## 2024-11-22 ENCOUNTER — PROCEDURE VISIT (OUTPATIENT)
Dept: PAIN MANAGEMENT | Age: 59
End: 2024-11-22
Payer: COMMERCIAL

## 2024-11-22 VITALS
DIASTOLIC BLOOD PRESSURE: 80 MMHG | HEIGHT: 65 IN | SYSTOLIC BLOOD PRESSURE: 121 MMHG | OXYGEN SATURATION: 95 % | HEART RATE: 75 BPM | TEMPERATURE: 98.4 F | BODY MASS INDEX: 32.49 KG/M2 | RESPIRATION RATE: 16 BRPM | WEIGHT: 195 LBS

## 2024-11-22 DIAGNOSIS — M25.512 CHRONIC PAIN OF BOTH SHOULDERS: Primary | ICD-10-CM

## 2024-11-22 DIAGNOSIS — G89.29 CHRONIC PAIN OF BOTH SHOULDERS: Primary | ICD-10-CM

## 2024-11-22 DIAGNOSIS — M25.511 CHRONIC PAIN OF BOTH SHOULDERS: Primary | ICD-10-CM

## 2024-11-22 PROCEDURE — 20610 DRAIN/INJ JOINT/BURSA W/O US: CPT | Performed by: PAIN MEDICINE

## 2024-11-22 RX ORDER — BUPIVACAINE HYDROCHLORIDE 2.5 MG/ML
10 INJECTION, SOLUTION INFILTRATION; PERINEURAL ONCE
Status: COMPLETED | OUTPATIENT
Start: 2024-11-22 | End: 2024-11-22

## 2024-11-22 RX ORDER — METHYLPREDNISOLONE ACETATE 40 MG/ML
40 INJECTION, SUSPENSION INTRA-ARTICULAR; INTRALESIONAL; INTRAMUSCULAR; SOFT TISSUE ONCE
Status: COMPLETED | OUTPATIENT
Start: 2024-11-22 | End: 2024-11-22

## 2024-11-22 RX ADMIN — METHYLPREDNISOLONE ACETATE 40 MG: 40 INJECTION, SUSPENSION INTRA-ARTICULAR; INTRALESIONAL; INTRAMUSCULAR; INTRASYNOVIAL; SOFT TISSUE at 14:51

## 2024-11-22 RX ADMIN — BUPIVACAINE HYDROCHLORIDE 25 MG: 2.5 INJECTION, SOLUTION INFILTRATION; PERINEURAL at 14:50

## 2024-11-22 NOTE — PROGRESS NOTES
.2024    Patient: Jb Easley  :  1965  Age:  59 y.o.  Sex:  male     PRE-OPERATIVE DIAGNOSIS: Bilateral Shoulder pain, osteoarthritis.    POST-OPERATIVE DIAGNOSIS: Same.    PROCEDURE PERFORMED: Bilateral Shoulder (glenohumeral joint) steroid injection.    SURGEON:   KIMANI Rocha D.O.    ANESTHESIA: local    ESTIMATED BLOOD LOSS: None.    BRIEF HISTORY: Jb Easley comes in today for Bilateral Shoulder steroid injection. After discussing the potential risks and benefits of the procedure with the patient.  Jb did request that we proceed. A complete History & Physical was reviewed and it is unchanged.    DESCRIPTION OF PROCEDURE:   The patient was placed in a seated position. The area of the Right shoulder was prepped with chloraprep and draped in a sterile manner. The overlying skin was anesthetized with ethyl chloride and then re-sterilized with alcohol wipes. Using the posterior approach, a 25 gauge 1 1/2 inch  needle was advanced  In anterolateral projection into the joint capsule. After negative aspiration a solution of 0.25 % marcaine 3 cc and 40 mg DepoMedrol was injected easily without complications. The needle was then removed and Band-Aid applied. The same procedure was completed on the other side.    Disposition the patient tolerated the procedure well and there were no complications .     Jb will follow up in our comprehensive Pain Management Center as scheduled. He was encouraged to call with questions, concerns or if worsening of symptoms occurs.    DO reji Ferrari

## 2024-11-22 NOTE — PROGRESS NOTES
Jb Easley presents to the [facility] on 11/22/2024. Jb is complaining of pain in both shoulders. The pain is constant. The pain is described as burning. Pain is rated on his best day at a 7, on his worst day at a 10, and on average at a 9 on the VAS scale. He took his last dose of  Roxicodone  this morning.     Any procedures since your last visit: Yes, with 100 % relief.    Pacemaker or defibrillator: No managed by na.    He is not on NSAIDS and is not on anticoagulation medications to include none and is managed by na.     Do you want someone present when the provider examines you? Yes    Medication Contract and Consent for Opioid Use Documents Filed       Patient Documents       Type of Document Status Date Received Received By Description    Medication Contract Received 1/24/2023  4:21 PM [facility]    Medication Contract Received 2/23/2024  3:32 PM TASHI PADILLA contract                    There were no vitals taken for this visit.     No LMP for male patient.

## 2024-11-26 ENCOUNTER — PREP FOR PROCEDURE (OUTPATIENT)
Dept: PAIN MANAGEMENT | Age: 59
End: 2024-11-26

## 2025-01-15 ENCOUNTER — TELEPHONE (OUTPATIENT)
Dept: PAIN MANAGEMENT | Age: 60
End: 2025-01-15

## 2025-01-15 DIAGNOSIS — M54.12 CERVICAL RADICULOPATHY: ICD-10-CM

## 2025-01-15 DIAGNOSIS — G89.29 CHRONIC PAIN OF LEFT KNEE: ICD-10-CM

## 2025-01-15 DIAGNOSIS — M25.512 CHRONIC LEFT SHOULDER PAIN: ICD-10-CM

## 2025-01-15 DIAGNOSIS — G89.29 CHRONIC RIGHT SHOULDER PAIN: ICD-10-CM

## 2025-01-15 DIAGNOSIS — M25.562 CHRONIC PAIN OF LEFT KNEE: ICD-10-CM

## 2025-01-15 DIAGNOSIS — G89.29 CHRONIC PAIN OF BOTH SHOULDERS: ICD-10-CM

## 2025-01-15 DIAGNOSIS — G89.29 CHRONIC LEFT SHOULDER PAIN: ICD-10-CM

## 2025-01-15 DIAGNOSIS — M54.42 CHRONIC BILATERAL LOW BACK PAIN WITH BILATERAL SCIATICA: ICD-10-CM

## 2025-01-15 DIAGNOSIS — M54.16 LUMBAR RADICULOPATHY: ICD-10-CM

## 2025-01-15 DIAGNOSIS — M48.02 CERVICAL STENOSIS OF SPINE: ICD-10-CM

## 2025-01-15 DIAGNOSIS — M25.511 CHRONIC RIGHT SHOULDER PAIN: ICD-10-CM

## 2025-01-15 DIAGNOSIS — G89.29 CHRONIC BILATERAL LOW BACK PAIN WITH BILATERAL SCIATICA: ICD-10-CM

## 2025-01-15 DIAGNOSIS — M25.512 CHRONIC PAIN OF BOTH SHOULDERS: ICD-10-CM

## 2025-01-15 DIAGNOSIS — M19.011 PRIMARY OSTEOARTHRITIS OF RIGHT SHOULDER: ICD-10-CM

## 2025-01-15 DIAGNOSIS — M25.511 CHRONIC PAIN OF BOTH SHOULDERS: ICD-10-CM

## 2025-01-15 DIAGNOSIS — G89.4 CHRONIC PAIN SYNDROME: ICD-10-CM

## 2025-01-15 DIAGNOSIS — M54.41 CHRONIC BILATERAL LOW BACK PAIN WITH BILATERAL SCIATICA: ICD-10-CM

## 2025-01-15 DIAGNOSIS — M50.90 CERVICAL DISC DISORDER: ICD-10-CM

## 2025-01-15 RX ORDER — OXYCODONE HYDROCHLORIDE 5 MG/1
5 TABLET ORAL DAILY PRN
Qty: 14 TABLET | Refills: 0 | Status: SHIPPED | OUTPATIENT
Start: 2025-01-15 | End: 2025-01-29

## 2025-01-28 NOTE — PROGRESS NOTES
[facility]  [address] [place] [address]    Follow up Note      Jb Easley     Date of Visit:  2025    CC:  Patient presents for follow up   Chief Complaint   Patient presents with    Neck Pain     Neck, b/l shoulder, back and left knee pain         HPI:    Pain is somewhat better.    Appropriate analgesia with current medications regimen: yes - somewhat.  Change in quality of symptoms:no.    Medication side effects:none.   Recent diagnostic testing:none.     2024 Bilateral Transforaminal epidural steroid injection under fluoroscopic guidance at foraminal level L5 - 50% relief which he is pleased with.  Very happy.       2024  Bilateral Shoulder (glenohumeral joint) steroid injection - great relief until he fell on .  .     He has been on anticoagulation medications to include ASA and NSAIDS and has not been on herbal supplements.  He is not diabetic.     Imagin/2022 xray rt shoulder -  FINDINGS:   Three views of the right shoulder were obtained.  There is no acute fracture   dislocation right shoulder.  There are degenerative changes of the AC joint.   The glenohumeral joint is intact and unremarkable.  There is no fracture of   the right scapula.           Impression   1. There is no fracture or dislocation of the right shoulder   2. Significant degenerative changes of the AC joint.      2022 xray lumbar f/e -      FINDINGS:   Anterolisthesis of L4 on L5 is 7 mm in flexion, 4 mm in the neutral position   and 1 mm in extension.  There is mild multilevel degenerative disc disease.   There is moderate degenerative facet arthropathy from L4-S1.           Impression   Findings of spinal instability at L4-5 as noted.  Degenerative lumbar   spondylosis.         2022 EDX -  Diagnostic Interpretation:   Normal emg of lower extremities; however, lumbar paraphasic potentials correlate with mri finding of severe stenosis such that patient is likely symptomatic from

## 2025-01-29 ENCOUNTER — OFFICE VISIT (OUTPATIENT)
Dept: PAIN MANAGEMENT | Age: 60
End: 2025-01-29
Payer: COMMERCIAL

## 2025-01-29 VITALS
RESPIRATION RATE: 18 BRPM | HEIGHT: 65 IN | DIASTOLIC BLOOD PRESSURE: 74 MMHG | OXYGEN SATURATION: 94 % | BODY MASS INDEX: 32.49 KG/M2 | HEART RATE: 78 BPM | WEIGHT: 195 LBS | TEMPERATURE: 98.1 F | SYSTOLIC BLOOD PRESSURE: 128 MMHG

## 2025-01-29 DIAGNOSIS — M25.512 CHRONIC PAIN OF BOTH SHOULDERS: Primary | ICD-10-CM

## 2025-01-29 DIAGNOSIS — M25.562 CHRONIC PAIN OF LEFT KNEE: ICD-10-CM

## 2025-01-29 DIAGNOSIS — M75.121 NONTRAUMATIC COMPLETE TEAR OF RIGHT ROTATOR CUFF: ICD-10-CM

## 2025-01-29 DIAGNOSIS — M25.511 CHRONIC RIGHT SHOULDER PAIN: ICD-10-CM

## 2025-01-29 DIAGNOSIS — M19.011 PRIMARY OSTEOARTHRITIS OF RIGHT SHOULDER: ICD-10-CM

## 2025-01-29 DIAGNOSIS — G89.29 CHRONIC RIGHT SHOULDER PAIN: ICD-10-CM

## 2025-01-29 DIAGNOSIS — M54.41 CHRONIC BILATERAL LOW BACK PAIN WITH BILATERAL SCIATICA: ICD-10-CM

## 2025-01-29 DIAGNOSIS — M48.062 SPINAL STENOSIS OF LUMBAR REGION WITH NEUROGENIC CLAUDICATION: ICD-10-CM

## 2025-01-29 DIAGNOSIS — G89.29 CHRONIC PAIN OF LEFT KNEE: ICD-10-CM

## 2025-01-29 DIAGNOSIS — G89.29 CHRONIC PAIN OF BOTH SHOULDERS: Primary | ICD-10-CM

## 2025-01-29 DIAGNOSIS — G89.4 CHRONIC PAIN SYNDROME: ICD-10-CM

## 2025-01-29 DIAGNOSIS — M54.16 LUMBAR RADICULOPATHY: ICD-10-CM

## 2025-01-29 DIAGNOSIS — M54.42 CHRONIC BILATERAL LOW BACK PAIN WITH BILATERAL SCIATICA: ICD-10-CM

## 2025-01-29 DIAGNOSIS — M25.512 CHRONIC LEFT SHOULDER PAIN: ICD-10-CM

## 2025-01-29 DIAGNOSIS — G89.29 CHRONIC LEFT SHOULDER PAIN: ICD-10-CM

## 2025-01-29 DIAGNOSIS — M25.511 CHRONIC PAIN OF BOTH SHOULDERS: Primary | ICD-10-CM

## 2025-01-29 DIAGNOSIS — M54.12 CERVICAL RADICULOPATHY: ICD-10-CM

## 2025-01-29 DIAGNOSIS — M50.90 CERVICAL DISC DISORDER: ICD-10-CM

## 2025-01-29 DIAGNOSIS — G89.29 CHRONIC BILATERAL LOW BACK PAIN WITH BILATERAL SCIATICA: ICD-10-CM

## 2025-01-29 DIAGNOSIS — M48.02 CERVICAL STENOSIS OF SPINE: ICD-10-CM

## 2025-01-29 PROCEDURE — G8427 DOCREV CUR MEDS BY ELIG CLIN: HCPCS | Performed by: PHYSICIAN ASSISTANT

## 2025-01-29 PROCEDURE — 99213 OFFICE O/P EST LOW 20 MIN: CPT

## 2025-01-29 PROCEDURE — 3017F COLORECTAL CA SCREEN DOC REV: CPT | Performed by: PHYSICIAN ASSISTANT

## 2025-01-29 PROCEDURE — 3074F SYST BP LT 130 MM HG: CPT | Performed by: PHYSICIAN ASSISTANT

## 2025-01-29 PROCEDURE — 3078F DIAST BP <80 MM HG: CPT | Performed by: PHYSICIAN ASSISTANT

## 2025-01-29 PROCEDURE — 1036F TOBACCO NON-USER: CPT | Performed by: PHYSICIAN ASSISTANT

## 2025-01-29 PROCEDURE — 99213 OFFICE O/P EST LOW 20 MIN: CPT | Performed by: PHYSICIAN ASSISTANT

## 2025-01-29 PROCEDURE — G8417 CALC BMI ABV UP PARAM F/U: HCPCS | Performed by: PHYSICIAN ASSISTANT

## 2025-01-29 RX ORDER — OXYCODONE AND ACETAMINOPHEN 5; 325 MG/1; MG/1
1 TABLET ORAL DAILY PRN
Qty: 30 TABLET | Refills: 0 | Status: SHIPPED | OUTPATIENT
Start: 2025-01-29 | End: 2025-02-28

## 2025-01-29 NOTE — PROGRESS NOTES
Jb Easley presents to the [facility] on 1/29/2025. Jb is complaining of neck, lower back and left knee pain. The pain is constant. The pain is described as aching, shooting, stabbing, and sharp. Pain is rated on his best day at a 8, on his worst day at a 10, and on average at a 8 on the VAS scale. He took his last dose of  Oxycodone  on Sunday 1/26/25 and Motrin today.      Any procedures since your last visit: Yes, with 60 % relief.    He is  on NSAIDS and  is not on anticoagulation medications.    Pacemaker or defibrillator: No    Do you want someone present when the provider examines you? No    Medication Contract and Consent for Opioid Use Documents Filed       Patient Documents       Type of Document Status Date Received Received By Description    Medication Contract Received 1/24/2023  4:21 PM [facility]    Medication Contract Received 2/23/2024  3:32 PM TASHI PADILLA med contract                       /74   Pulse 78   Temp 98.1 °F (36.7 °C) (Infrared)   Resp 18   Ht 1.651 m (5' 5\")   Wt 88.5 kg (195 lb)   SpO2 94%   BMI 32.45 kg/m²      No LMP for male patient.

## 2025-02-13 ENCOUNTER — HOSPITAL ENCOUNTER (OUTPATIENT)
Dept: MRI IMAGING | Age: 60
Discharge: HOME | End: 2025-02-15
Payer: COMMERCIAL

## 2025-02-13 DIAGNOSIS — M75.121 NONTRAUMATIC COMPLETE TEAR OF RIGHT ROTATOR CUFF: ICD-10-CM

## 2025-02-13 PROCEDURE — 73221 MRI JOINT UPR EXTREM W/O DYE: CPT

## 2025-03-06 ENCOUNTER — OFFICE VISIT (OUTPATIENT)
Dept: PAIN MANAGEMENT | Age: 60
End: 2025-03-06
Payer: COMMERCIAL

## 2025-03-06 VITALS
OXYGEN SATURATION: 96 % | RESPIRATION RATE: 18 BRPM | HEART RATE: 80 BPM | SYSTOLIC BLOOD PRESSURE: 120 MMHG | DIASTOLIC BLOOD PRESSURE: 73 MMHG | BODY MASS INDEX: 32.49 KG/M2 | TEMPERATURE: 99.9 F | HEIGHT: 65 IN | WEIGHT: 195 LBS

## 2025-03-06 DIAGNOSIS — G89.4 CHRONIC PAIN SYNDROME: ICD-10-CM

## 2025-03-06 DIAGNOSIS — M25.562 CHRONIC PAIN OF LEFT KNEE: ICD-10-CM

## 2025-03-06 DIAGNOSIS — G89.29 CHRONIC PAIN OF LEFT KNEE: ICD-10-CM

## 2025-03-06 DIAGNOSIS — M75.121 NONTRAUMATIC COMPLETE TEAR OF RIGHT ROTATOR CUFF: ICD-10-CM

## 2025-03-06 DIAGNOSIS — G89.29 CHRONIC LEFT SHOULDER PAIN: ICD-10-CM

## 2025-03-06 DIAGNOSIS — G89.29 CHRONIC PAIN OF BOTH SHOULDERS: Primary | ICD-10-CM

## 2025-03-06 DIAGNOSIS — M48.062 SPINAL STENOSIS OF LUMBAR REGION WITH NEUROGENIC CLAUDICATION: ICD-10-CM

## 2025-03-06 DIAGNOSIS — G89.29 CHRONIC RIGHT SHOULDER PAIN: ICD-10-CM

## 2025-03-06 DIAGNOSIS — M25.511 CHRONIC RIGHT SHOULDER PAIN: ICD-10-CM

## 2025-03-06 DIAGNOSIS — M54.12 CERVICAL RADICULOPATHY: ICD-10-CM

## 2025-03-06 DIAGNOSIS — M25.512 CHRONIC PAIN OF BOTH SHOULDERS: Primary | ICD-10-CM

## 2025-03-06 DIAGNOSIS — M25.511 CHRONIC PAIN OF BOTH SHOULDERS: Primary | ICD-10-CM

## 2025-03-06 DIAGNOSIS — M54.42 CHRONIC BILATERAL LOW BACK PAIN WITH BILATERAL SCIATICA: ICD-10-CM

## 2025-03-06 DIAGNOSIS — M48.02 CERVICAL STENOSIS OF SPINE: ICD-10-CM

## 2025-03-06 DIAGNOSIS — M19.011 PRIMARY OSTEOARTHRITIS OF RIGHT SHOULDER: ICD-10-CM

## 2025-03-06 DIAGNOSIS — M25.512 CHRONIC LEFT SHOULDER PAIN: ICD-10-CM

## 2025-03-06 DIAGNOSIS — G89.29 CHRONIC BILATERAL LOW BACK PAIN WITH BILATERAL SCIATICA: ICD-10-CM

## 2025-03-06 DIAGNOSIS — M54.41 CHRONIC BILATERAL LOW BACK PAIN WITH BILATERAL SCIATICA: ICD-10-CM

## 2025-03-06 DIAGNOSIS — M54.16 LUMBAR RADICULOPATHY: ICD-10-CM

## 2025-03-06 DIAGNOSIS — M50.90 CERVICAL DISC DISORDER: ICD-10-CM

## 2025-03-06 PROCEDURE — 99213 OFFICE O/P EST LOW 20 MIN: CPT | Performed by: PHYSICIAN ASSISTANT

## 2025-03-06 PROCEDURE — G8427 DOCREV CUR MEDS BY ELIG CLIN: HCPCS | Performed by: PHYSICIAN ASSISTANT

## 2025-03-06 PROCEDURE — 3017F COLORECTAL CA SCREEN DOC REV: CPT | Performed by: PHYSICIAN ASSISTANT

## 2025-03-06 PROCEDURE — 3074F SYST BP LT 130 MM HG: CPT | Performed by: PHYSICIAN ASSISTANT

## 2025-03-06 PROCEDURE — 3078F DIAST BP <80 MM HG: CPT | Performed by: PHYSICIAN ASSISTANT

## 2025-03-06 PROCEDURE — 1036F TOBACCO NON-USER: CPT | Performed by: PHYSICIAN ASSISTANT

## 2025-03-06 PROCEDURE — G8417 CALC BMI ABV UP PARAM F/U: HCPCS | Performed by: PHYSICIAN ASSISTANT

## 2025-03-06 RX ORDER — TRAZODONE HYDROCHLORIDE 50 MG/1
50 TABLET ORAL NIGHTLY
COMMUNITY

## 2025-03-06 RX ORDER — IPRATROPIUM BROMIDE AND ALBUTEROL SULFATE 2.5; .5 MG/3ML; MG/3ML
SOLUTION RESPIRATORY (INHALATION)
COMMUNITY
Start: 2024-12-26

## 2025-03-06 RX ORDER — OXYCODONE AND ACETAMINOPHEN 5; 325 MG/1; MG/1
1 TABLET ORAL DAILY PRN
Qty: 30 TABLET | Refills: 0 | Status: SHIPPED | OUTPATIENT
Start: 2025-03-06 | End: 2025-04-05

## 2025-03-06 NOTE — PROGRESS NOTES
Jb Easley presents to the [facility] on 3/6/2025. Jb is complaining of pain neck, shoulder, knee, back. The pain is constant. The pain is described as aching, throbbing, stabbing, sharp, and burning. Pain is rated on his best day at a 6, on his worst day at a 10, and on average at a 9 on the VAS scale. He took his last dose of Percocet 3 days ago.     Any procedures since your last visit: No  Pacemaker or defibrillator: No.    He is not on NSAIDS and is not on anticoagulation medications     Medication Contract and Consent for Opioid Use Documents Filed       Patient Documents       Type of Document Status Date Received Received By Description    Medication Contract Received 1/24/2023  4:21 PM [facility]    Medication Contract Received 2/23/2024  3:32 PM TASHI PADILLA med contract                    Resp 18   Ht 1.651 m (5' 5\")   Wt 88.5 kg (195 lb)   BMI 32.45 kg/m²      No LMP for male patient.    
The   right neural foramen is patent.     C6-C7: Minor disc bulge and mild ligamentum flavum thickening is seen   with mild narrowing of the spinal canal.  There is mild right neural   foraminal narrowing due to uncovertebral arthropathy.  There is no left   neural foraminal narrowing.     C7-T1: There is moderate left neural foraminal narrowing due to   uncovertebral and facet joint arthropathy.  There is minor disc bulge   without spinal canal or right neural foraminal narrowing.       IMPRESSION   IMPRESSION:     1.  Multilevel degenerative cervical spondylosis.   2.  At C5-C6 there is moderate stenosis of the spinal canal with cord   impingement.  There is also severe left neural foraminal stenosis.   3.  Congenital narrowing of the spinal canal.   4.  Modic type I degenerative endplate change at C5-C6.   5.  Level by level description as detailed.     Anatomic Variant:  None.  Assume 7 cervical vertebrae with counting from   the craniocervical junction.      2022 cervical xray -  FINDINGS:   No fracture or malalignment.  No evidence of instability on flexion or   extension imaging.  Mild narrowing of disc space at C5/C6.  No prevertebral   soft tissue edema.           Impression   1. No evidence of fracture or malalignment.   2. No evidence of instability on flexion or extension imaging.                                         Potential Aberrant Drug-Related Behavior:  None.     Urine Drug Screenin2022 consistent   2023 consistent  2024 consistent  2024 consistent     OARRS report:  2022 to 2025 consistent    Opioid Agreement:  2023  Updated: 2024 - update today    Past Medical History:   Diagnosis Date    Asthma     Bronchitis     COPD (chronic obstructive pulmonary disease) (HCC)     Coronary artery disease     Diabetes mellitus (HCC)     Hypertension     Lumbar radiculopathy        Past Surgical History:   Procedure Laterality Date    PAIN MANAGEMENT PROCEDURE Bilateral

## 2025-03-19 ENCOUNTER — OFFICE VISIT (OUTPATIENT)
Dept: ORTHOPEDIC SURGERY | Age: 60
End: 2025-03-19

## 2025-03-19 VITALS
RESPIRATION RATE: 20 BRPM | BODY MASS INDEX: 32.49 KG/M2 | OXYGEN SATURATION: 96 % | HEIGHT: 65 IN | DIASTOLIC BLOOD PRESSURE: 71 MMHG | HEART RATE: 76 BPM | TEMPERATURE: 98.4 F | SYSTOLIC BLOOD PRESSURE: 110 MMHG | WEIGHT: 195 LBS

## 2025-03-19 DIAGNOSIS — M75.121 NONTRAUMATIC COMPLETE TEAR OF RIGHT ROTATOR CUFF: Primary | ICD-10-CM

## 2025-03-19 RX ORDER — TRIAMCINOLONE ACETONIDE 40 MG/ML
40 INJECTION, SUSPENSION INTRA-ARTICULAR; INTRAMUSCULAR ONCE
Status: COMPLETED | OUTPATIENT
Start: 2025-03-19 | End: 2025-03-19

## 2025-03-19 RX ORDER — LIDOCAINE HYDROCHLORIDE 10 MG/ML
4 INJECTION, SOLUTION INFILTRATION; PERINEURAL ONCE
Status: COMPLETED | OUTPATIENT
Start: 2025-03-19 | End: 2025-03-19

## 2025-03-19 RX ADMIN — LIDOCAINE HYDROCHLORIDE 4 ML: 10 INJECTION, SOLUTION INFILTRATION; PERINEURAL at 15:30

## 2025-03-19 RX ADMIN — TRIAMCINOLONE ACETONIDE 40 MG: 40 INJECTION, SUSPENSION INTRA-ARTICULAR; INTRAMUSCULAR at 15:31

## 2025-03-19 NOTE — PROGRESS NOTES
3/19/25    The patient (or guardian, if applicable) and other individuals in attendance with the patient were advised that Artificial Intelligence will be utilized during this visit to record, process the conversation to generate a clinical note, and support improvement of the AI technology. The patient (or guardian, if applicable) and other individuals in attendance at the appointment consented to the use of AI, including the recording.

## 2025-04-09 ENCOUNTER — OFFICE VISIT (OUTPATIENT)
Dept: PAIN MANAGEMENT | Age: 60
End: 2025-04-09
Payer: MEDICARE

## 2025-04-09 VITALS
BODY MASS INDEX: 30.49 KG/M2 | WEIGHT: 183 LBS | OXYGEN SATURATION: 96 % | DIASTOLIC BLOOD PRESSURE: 58 MMHG | SYSTOLIC BLOOD PRESSURE: 101 MMHG | HEART RATE: 81 BPM | HEIGHT: 65 IN | RESPIRATION RATE: 18 BRPM | TEMPERATURE: 99.5 F

## 2025-04-09 DIAGNOSIS — M25.511 CHRONIC PAIN OF BOTH SHOULDERS: Primary | ICD-10-CM

## 2025-04-09 DIAGNOSIS — G89.29 CHRONIC LEFT SHOULDER PAIN: ICD-10-CM

## 2025-04-09 DIAGNOSIS — M25.512 CHRONIC LEFT SHOULDER PAIN: ICD-10-CM

## 2025-04-09 DIAGNOSIS — M54.16 LUMBAR RADICULOPATHY: ICD-10-CM

## 2025-04-09 DIAGNOSIS — G89.29 CHRONIC BILATERAL LOW BACK PAIN WITH BILATERAL SCIATICA: ICD-10-CM

## 2025-04-09 DIAGNOSIS — M79.672 HEEL PAIN, CHRONIC, LEFT: ICD-10-CM

## 2025-04-09 DIAGNOSIS — G89.29 CHRONIC RIGHT SHOULDER PAIN: ICD-10-CM

## 2025-04-09 DIAGNOSIS — M48.02 CERVICAL STENOSIS OF SPINE: ICD-10-CM

## 2025-04-09 DIAGNOSIS — G89.4 CHRONIC PAIN SYNDROME: ICD-10-CM

## 2025-04-09 DIAGNOSIS — G89.29 CHRONIC PAIN OF BOTH SHOULDERS: Primary | ICD-10-CM

## 2025-04-09 DIAGNOSIS — M54.12 CERVICAL RADICULOPATHY: ICD-10-CM

## 2025-04-09 DIAGNOSIS — M25.562 CHRONIC PAIN OF LEFT KNEE: ICD-10-CM

## 2025-04-09 DIAGNOSIS — M54.41 CHRONIC BILATERAL LOW BACK PAIN WITH BILATERAL SCIATICA: ICD-10-CM

## 2025-04-09 DIAGNOSIS — M25.512 CHRONIC PAIN OF BOTH SHOULDERS: Primary | ICD-10-CM

## 2025-04-09 DIAGNOSIS — M48.062 SPINAL STENOSIS OF LUMBAR REGION WITH NEUROGENIC CLAUDICATION: ICD-10-CM

## 2025-04-09 DIAGNOSIS — M25.511 CHRONIC RIGHT SHOULDER PAIN: ICD-10-CM

## 2025-04-09 DIAGNOSIS — G89.29 CHRONIC PAIN OF LEFT KNEE: ICD-10-CM

## 2025-04-09 DIAGNOSIS — M50.90 CERVICAL DISC DISORDER: ICD-10-CM

## 2025-04-09 DIAGNOSIS — G89.29 HEEL PAIN, CHRONIC, LEFT: ICD-10-CM

## 2025-04-09 DIAGNOSIS — M54.42 CHRONIC BILATERAL LOW BACK PAIN WITH BILATERAL SCIATICA: ICD-10-CM

## 2025-04-09 DIAGNOSIS — M19.011 PRIMARY OSTEOARTHRITIS OF RIGHT SHOULDER: ICD-10-CM

## 2025-04-09 PROCEDURE — 99214 OFFICE O/P EST MOD 30 MIN: CPT

## 2025-04-09 PROCEDURE — 1036F TOBACCO NON-USER: CPT | Performed by: PHYSICIAN ASSISTANT

## 2025-04-09 PROCEDURE — G8417 CALC BMI ABV UP PARAM F/U: HCPCS | Performed by: PHYSICIAN ASSISTANT

## 2025-04-09 PROCEDURE — 99214 OFFICE O/P EST MOD 30 MIN: CPT | Performed by: PHYSICIAN ASSISTANT

## 2025-04-09 PROCEDURE — 3074F SYST BP LT 130 MM HG: CPT | Performed by: PHYSICIAN ASSISTANT

## 2025-04-09 PROCEDURE — 3017F COLORECTAL CA SCREEN DOC REV: CPT | Performed by: PHYSICIAN ASSISTANT

## 2025-04-09 PROCEDURE — G8427 DOCREV CUR MEDS BY ELIG CLIN: HCPCS | Performed by: PHYSICIAN ASSISTANT

## 2025-04-09 PROCEDURE — 3078F DIAST BP <80 MM HG: CPT | Performed by: PHYSICIAN ASSISTANT

## 2025-04-09 RX ORDER — OXYCODONE AND ACETAMINOPHEN 5; 325 MG/1; MG/1
1 TABLET ORAL DAILY PRN
Qty: 30 TABLET | Refills: 0 | Status: SHIPPED | OUTPATIENT
Start: 2025-04-09 | End: 2025-05-09

## 2025-04-09 NOTE — PROGRESS NOTES
Jb Easley presents to the [facility] on 4/9/2025. Jb is complaining of pain back, neck, shoulder, knees. The pain is constant. The pain is described as aching, throbbing, stabbing, and sharp. Pain is rated on his best day at a 6, on his worst day at a 10, and on average at a 6 on the VAS scale. He took his last dose of Percocet and Motrin 3 days ago.     Any procedures since your last visit: No  Pacemaker or defibrillator: No  He is not on NSAIDS and is not on anticoagulation medications   Do you want someone present when the provider examines you? no    Medication Contract and Consent for Opioid Use Documents Filed       Patient Documents       Type of Document Status Date Received Received By Description    Medication Contract Received 1/24/2023  4:21 PM [facility]    Medication Contract Received 2/23/2024  3:32 PM TASHI PADILLA contract                    There were no vitals taken for this visit.     No LMP for male patient.

## 2025-04-09 NOTE — PROGRESS NOTES
[facility]  [address] [place], [place] [id number]    Follow up Note      Jb Easley     Date of Visit:  2025    CC:  Patient presents for follow up   Chief Complaint   Patient presents with    Back Pain    Knee Pain    Neck Pain         HPI:    Pain is controlled to right shoulder after seeing ortho.   New left heel pain recently.  No injury.    Appropriate analgesia with current medications regimen: yes - somewhat.  Change in quality of symptoms:no.    Medication side effects:none.   Recent diagnostic testing:none.      He has been on anticoagulation medications to include ASA and NSAIDS and has not been on herbal supplements.  He is not diabetic.     Imagin/2022 xray rt shoulder -  FINDINGS:   Three views of the right shoulder were obtained.  There is no acute fracture   dislocation right shoulder.  There are degenerative changes of the AC joint.   The glenohumeral joint is intact and unremarkable.  There is no fracture of   the right scapula.           Impression   1. There is no fracture or dislocation of the right shoulder   2. Significant degenerative changes of the AC joint.      2022 xray lumbar f/e -      FINDINGS:   Anterolisthesis of L4 on L5 is 7 mm in flexion, 4 mm in the neutral position   and 1 mm in extension.  There is mild multilevel degenerative disc disease.   There is moderate degenerative facet arthropathy from L4-S1.           Impression   Findings of spinal instability at L4-5 as noted.  Degenerative lumbar   spondylosis.         2022 EDX -  Diagnostic Interpretation:   Normal emg of lower extremities; however, lumbar paraphasic potentials correlate with mri finding of severe stenosis such that patient is likely symptomatic from lumbar stenosis.  Clinical correlation advised.        Technologist: Norma Almanzar  Physician:Kelton Smith MD     Nerve conduction studies and electromyography were performed according to our laboratory policies and procedures

## 2025-05-27 ENCOUNTER — OFFICE VISIT (OUTPATIENT)
Age: 60
End: 2025-05-27
Payer: MEDICARE

## 2025-05-27 VITALS
HEIGHT: 65 IN | WEIGHT: 183 LBS | SYSTOLIC BLOOD PRESSURE: 133 MMHG | BODY MASS INDEX: 30.49 KG/M2 | OXYGEN SATURATION: 96 % | RESPIRATION RATE: 16 BRPM | HEART RATE: 77 BPM | DIASTOLIC BLOOD PRESSURE: 85 MMHG | TEMPERATURE: 97.5 F

## 2025-05-27 DIAGNOSIS — G89.29 CHRONIC BILATERAL LOW BACK PAIN WITH BILATERAL SCIATICA: ICD-10-CM

## 2025-05-27 DIAGNOSIS — M48.062 SPINAL STENOSIS OF LUMBAR REGION WITH NEUROGENIC CLAUDICATION: ICD-10-CM

## 2025-05-27 DIAGNOSIS — M54.12 CERVICAL RADICULOPATHY: ICD-10-CM

## 2025-05-27 DIAGNOSIS — M25.512 CHRONIC LEFT SHOULDER PAIN: ICD-10-CM

## 2025-05-27 DIAGNOSIS — G89.29 CHRONIC LEFT SHOULDER PAIN: ICD-10-CM

## 2025-05-27 DIAGNOSIS — M19.011 PRIMARY OSTEOARTHRITIS OF RIGHT SHOULDER: ICD-10-CM

## 2025-05-27 DIAGNOSIS — G89.29 CHRONIC PAIN OF BOTH SHOULDERS: Primary | ICD-10-CM

## 2025-05-27 DIAGNOSIS — M54.42 CHRONIC BILATERAL LOW BACK PAIN WITH BILATERAL SCIATICA: ICD-10-CM

## 2025-05-27 DIAGNOSIS — M54.41 CHRONIC BILATERAL LOW BACK PAIN WITH BILATERAL SCIATICA: ICD-10-CM

## 2025-05-27 DIAGNOSIS — G89.4 CHRONIC PAIN SYNDROME: ICD-10-CM

## 2025-05-27 DIAGNOSIS — M25.512 CHRONIC PAIN OF BOTH SHOULDERS: Primary | ICD-10-CM

## 2025-05-27 DIAGNOSIS — M25.511 CHRONIC PAIN OF BOTH SHOULDERS: Primary | ICD-10-CM

## 2025-05-27 DIAGNOSIS — G89.29 CHRONIC PAIN OF LEFT KNEE: ICD-10-CM

## 2025-05-27 DIAGNOSIS — M54.16 LUMBAR RADICULOPATHY: ICD-10-CM

## 2025-05-27 DIAGNOSIS — G89.29 CHRONIC RIGHT SHOULDER PAIN: ICD-10-CM

## 2025-05-27 DIAGNOSIS — M25.511 CHRONIC RIGHT SHOULDER PAIN: ICD-10-CM

## 2025-05-27 DIAGNOSIS — M50.90 CERVICAL DISC DISORDER: ICD-10-CM

## 2025-05-27 DIAGNOSIS — M25.562 CHRONIC PAIN OF LEFT KNEE: ICD-10-CM

## 2025-05-27 DIAGNOSIS — M48.02 CERVICAL STENOSIS OF SPINE: ICD-10-CM

## 2025-05-27 PROCEDURE — G8417 CALC BMI ABV UP PARAM F/U: HCPCS | Performed by: PHYSICIAN ASSISTANT

## 2025-05-27 PROCEDURE — 99213 OFFICE O/P EST LOW 20 MIN: CPT | Performed by: PHYSICIAN ASSISTANT

## 2025-05-27 PROCEDURE — G8427 DOCREV CUR MEDS BY ELIG CLIN: HCPCS | Performed by: PHYSICIAN ASSISTANT

## 2025-05-27 PROCEDURE — 3075F SYST BP GE 130 - 139MM HG: CPT | Performed by: PHYSICIAN ASSISTANT

## 2025-05-27 PROCEDURE — 3079F DIAST BP 80-89 MM HG: CPT | Performed by: PHYSICIAN ASSISTANT

## 2025-05-27 PROCEDURE — 1036F TOBACCO NON-USER: CPT | Performed by: PHYSICIAN ASSISTANT

## 2025-05-27 PROCEDURE — 3017F COLORECTAL CA SCREEN DOC REV: CPT | Performed by: PHYSICIAN ASSISTANT

## 2025-05-27 RX ORDER — OXYCODONE AND ACETAMINOPHEN 5; 325 MG/1; MG/1
1 TABLET ORAL DAILY PRN
Qty: 30 TABLET | Refills: 0 | Status: SHIPPED | OUTPATIENT
Start: 2025-05-27 | End: 2025-06-26

## 2025-05-27 RX ORDER — LIDOCAINE 36 MG/1
1 PATCH TOPICAL DAILY PRN
Qty: 30 PATCH | Refills: 2 | Status: SHIPPED | OUTPATIENT
Start: 2025-05-27 | End: 2025-06-26

## 2025-05-27 NOTE — PROGRESS NOTES
Jb Easley presents to the [facility] on 5/27/2025. Jb is complaining of pain in his arms. The pain is constant. The pain is described as aching, dull, and sharp. Pain is rated on his best day at a 8, on his worst day at a 10, and on average at a 9 on the VAS scale.    Any procedures since your last visit: No    Pacemaker or defibrillator: No     He is not on NSAIDS and is not on anticoagulation medications.    Do you want someone present when the provider examines you? no    Medication Contract and Consent for Opioid Use Documents Filed       Patient Documents       Type of Document Status Date Received Received By Description    Medication Contract Received 1/24/2023  4:21 PM [facility]    Medication Contract Received 2/23/2024  3:32 PM TASHI PADILLA contract    Consent Opioid Use Received 4/9/2025  2:26 PM [facility]                    /85   Pulse 77   Temp 97.5 °F (36.4 °C) (Infrared)   Resp 16   Ht 1.651 m (5' 5\")   Wt 83 kg (183 lb)   SpO2 96%   BMI 30.45 kg/m²      No LMP for male patient.

## 2025-05-27 NOTE — PROGRESS NOTES
[facility]  [address] [place] [id number]    Follow up Note      Jb Easley     Date of Visit:  2025    CC:  Patient presents for follow up   Chief Complaint   Patient presents with    Follow-up     Bilateral arm pain         HPI:    Pain is controlled to his lower back.  Left shoulder is bothersome. Right shoulder is doing well.    Appropriate analgesia with current medications regimen: yes - somewhat.  Change in quality of symptoms:no.    Medication side effects:none.   Recent diagnostic testing:none.      He has been on anticoagulation medications to include ASA and NSAIDS and has not been on herbal supplements.  He is not diabetic.     Imagin/2022 xray rt shoulder -  FINDINGS:   Three views of the right shoulder were obtained.  There is no acute fracture   dislocation right shoulder.  There are degenerative changes of the AC joint.   The glenohumeral joint is intact and unremarkable.  There is no fracture of   the right scapula.           Impression   1. There is no fracture or dislocation of the right shoulder   2. Significant degenerative changes of the AC joint.      2022 xray lumbar f/e -      FINDINGS:   Anterolisthesis of L4 on L5 is 7 mm in flexion, 4 mm in the neutral position   and 1 mm in extension.  There is mild multilevel degenerative disc disease.   There is moderate degenerative facet arthropathy from L4-S1.           Impression   Findings of spinal instability at L4-5 as noted.  Degenerative lumbar   spondylosis.         2022 EDX -  Diagnostic Interpretation:   Normal emg of lower extremities; however, lumbar paraphasic potentials correlate with mri finding of severe stenosis such that patient is likely symptomatic from lumbar stenosis.  Clinical correlation advised.        Technologist: Norma Almanzar  Physician:Kelton Smith MD     Nerve conduction studies and electromyography were performed according to our laboratory policies and procedures which

## 2025-06-04 ENCOUNTER — OFFICE VISIT (OUTPATIENT)
Dept: ORTHOPEDIC SURGERY | Age: 60
End: 2025-06-04
Payer: MEDICARE

## 2025-06-04 VITALS
TEMPERATURE: 97.7 F | HEIGHT: 65 IN | DIASTOLIC BLOOD PRESSURE: 76 MMHG | HEART RATE: 75 BPM | SYSTOLIC BLOOD PRESSURE: 117 MMHG | BODY MASS INDEX: 30.45 KG/M2 | RESPIRATION RATE: 20 BRPM | OXYGEN SATURATION: 97 %

## 2025-06-04 DIAGNOSIS — M12.811 ROTATOR CUFF TEAR ARTHROPATHY OF BOTH SHOULDERS: Primary | ICD-10-CM

## 2025-06-04 DIAGNOSIS — M75.102 ROTATOR CUFF TEAR ARTHROPATHY OF BOTH SHOULDERS: Primary | ICD-10-CM

## 2025-06-04 DIAGNOSIS — M25.512 LEFT SHOULDER PAIN, UNSPECIFIED CHRONICITY: ICD-10-CM

## 2025-06-04 DIAGNOSIS — M12.812 ROTATOR CUFF TEAR ARTHROPATHY OF BOTH SHOULDERS: Primary | ICD-10-CM

## 2025-06-04 DIAGNOSIS — M75.101 ROTATOR CUFF TEAR ARTHROPATHY OF BOTH SHOULDERS: Primary | ICD-10-CM

## 2025-06-04 PROCEDURE — 1036F TOBACCO NON-USER: CPT | Performed by: ORTHOPAEDIC SURGERY

## 2025-06-04 PROCEDURE — 3017F COLORECTAL CA SCREEN DOC REV: CPT | Performed by: ORTHOPAEDIC SURGERY

## 2025-06-04 PROCEDURE — G8427 DOCREV CUR MEDS BY ELIG CLIN: HCPCS | Performed by: ORTHOPAEDIC SURGERY

## 2025-06-04 PROCEDURE — 99214 OFFICE O/P EST MOD 30 MIN: CPT | Performed by: ORTHOPAEDIC SURGERY

## 2025-06-04 PROCEDURE — 3078F DIAST BP <80 MM HG: CPT | Performed by: ORTHOPAEDIC SURGERY

## 2025-06-04 PROCEDURE — G8417 CALC BMI ABV UP PARAM F/U: HCPCS | Performed by: ORTHOPAEDIC SURGERY

## 2025-06-04 PROCEDURE — 3074F SYST BP LT 130 MM HG: CPT | Performed by: ORTHOPAEDIC SURGERY

## 2025-06-04 PROCEDURE — 20610 DRAIN/INJ JOINT/BURSA W/O US: CPT | Performed by: ORTHOPAEDIC SURGERY

## 2025-06-04 RX ORDER — TRIAMCINOLONE ACETONIDE 40 MG/ML
40 INJECTION, SUSPENSION INTRA-ARTICULAR; INTRAMUSCULAR ONCE
Status: COMPLETED | OUTPATIENT
Start: 2025-06-04 | End: 2025-06-04

## 2025-06-04 RX ORDER — BUPIVACAINE HYDROCHLORIDE 2.5 MG/ML
2 INJECTION, SOLUTION INFILTRATION; PERINEURAL ONCE
Status: COMPLETED | OUTPATIENT
Start: 2025-06-04 | End: 2025-06-04

## 2025-06-04 RX ADMIN — TRIAMCINOLONE ACETONIDE 40 MG: 40 INJECTION, SUSPENSION INTRA-ARTICULAR; INTRAMUSCULAR at 16:27

## 2025-06-04 RX ADMIN — BUPIVACAINE HYDROCHLORIDE 5 MG: 2.5 INJECTION, SOLUTION INFILTRATION; PERINEURAL at 16:28

## 2025-06-04 RX ADMIN — BUPIVACAINE HYDROCHLORIDE 5 MG: 2.5 INJECTION, SOLUTION INFILTRATION; PERINEURAL at 16:26

## 2025-06-04 NOTE — PROGRESS NOTES
[facility]  ORTHOPAEDICS    Follow Up Visit     CHIEF COMPLAINT:   Chief Complaint   Patient presents with    Shoulder Pain     Pt presents today for his Lt shoulder, h/o of torn rotator cuff. States last week he \"pulled\" his shoulder. Describes his pain as sharp and burning pain w/ movement. Last CSI more than six months ago. Rates his pain a 7 on a 0-10 pain scale.         History of Present Illness  The patient presents for a new problem with his left shoulder.    He reports an incident that occurred while he was turning off a lawnmower, during which his elbow was caught in the air, resulting in a sensation similar to an electrical shock. This event led to a temporary loss of function in his arm. He has been applying patches to the affected area as part of his self-care regimen. The incident happened on 05/24/2025. He has not observed any alterations in his biceps muscle. He is interested in receiving an injection for his left shoulder.    He also expresses a desire to receive an injection in his right shoulder, which was previously treated with an injection in 03/2025.    SOCIAL HISTORY  Marital Status:   Occupations: Worked in construction      Physical Exam:     Height: 1.651 m (5' 5\"), BP: 117/76    General: Alert and oriented x3, no acute distress  Cardiovascular/pulmonary: No labored breathing, peripheral perfusion intact  Musculoskeletal:    Physical Exam  Musculoskeletal:  Left shoulder: He can elevate about 100 degrees, with assistance up to about 140.  He can internally rotate to L1.  He has pain with external rotation.  Belly press test grossly intact.  Sole test painful but decent strength.      Controlled Substances Monitoring:      Imaging:    Results  Imaging  MRI of both shoulders reviewed from several years ago showing massive irreparable rotator cuff tears bilaterally.              Assesment/Plan:     Assessment & Plan  1. 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

## 2025-07-25 ENCOUNTER — OFFICE VISIT (OUTPATIENT)
Age: 60
End: 2025-07-25

## 2025-07-25 VITALS
SYSTOLIC BLOOD PRESSURE: 110 MMHG | RESPIRATION RATE: 16 BRPM | OXYGEN SATURATION: 95 % | DIASTOLIC BLOOD PRESSURE: 67 MMHG | BODY MASS INDEX: 30.49 KG/M2 | WEIGHT: 183 LBS | HEIGHT: 65 IN

## 2025-07-25 DIAGNOSIS — G89.29 CHRONIC RIGHT SHOULDER PAIN: ICD-10-CM

## 2025-07-25 DIAGNOSIS — G89.29 CHRONIC PAIN OF BOTH SHOULDERS: Primary | ICD-10-CM

## 2025-07-25 DIAGNOSIS — M54.42 CHRONIC BILATERAL LOW BACK PAIN WITH BILATERAL SCIATICA: ICD-10-CM

## 2025-07-25 DIAGNOSIS — M25.511 CHRONIC PAIN OF BOTH SHOULDERS: Primary | ICD-10-CM

## 2025-07-25 DIAGNOSIS — M48.02 CERVICAL STENOSIS OF SPINE: ICD-10-CM

## 2025-07-25 DIAGNOSIS — M50.90 CERVICAL DISC DISORDER: ICD-10-CM

## 2025-07-25 DIAGNOSIS — G89.4 CHRONIC PAIN SYNDROME: ICD-10-CM

## 2025-07-25 DIAGNOSIS — Z79.891 ENCOUNTER FOR LONG-TERM OPIATE ANALGESIC USE: ICD-10-CM

## 2025-07-25 DIAGNOSIS — M25.562 CHRONIC PAIN OF LEFT KNEE: ICD-10-CM

## 2025-07-25 DIAGNOSIS — M25.512 CHRONIC PAIN OF BOTH SHOULDERS: Primary | ICD-10-CM

## 2025-07-25 DIAGNOSIS — G89.29 CHRONIC PAIN OF LEFT KNEE: ICD-10-CM

## 2025-07-25 DIAGNOSIS — M48.062 SPINAL STENOSIS OF LUMBAR REGION WITH NEUROGENIC CLAUDICATION: ICD-10-CM

## 2025-07-25 DIAGNOSIS — M25.511 CHRONIC RIGHT SHOULDER PAIN: ICD-10-CM

## 2025-07-25 DIAGNOSIS — M19.011 PRIMARY OSTEOARTHRITIS OF RIGHT SHOULDER: ICD-10-CM

## 2025-07-25 DIAGNOSIS — G89.29 CHRONIC LEFT SHOULDER PAIN: ICD-10-CM

## 2025-07-25 DIAGNOSIS — M54.16 LUMBAR RADICULOPATHY: ICD-10-CM

## 2025-07-25 DIAGNOSIS — G89.29 CHRONIC BILATERAL LOW BACK PAIN WITH BILATERAL SCIATICA: ICD-10-CM

## 2025-07-25 DIAGNOSIS — M54.41 CHRONIC BILATERAL LOW BACK PAIN WITH BILATERAL SCIATICA: ICD-10-CM

## 2025-07-25 DIAGNOSIS — M54.12 CERVICAL RADICULOPATHY: ICD-10-CM

## 2025-07-25 DIAGNOSIS — M25.512 CHRONIC LEFT SHOULDER PAIN: ICD-10-CM

## 2025-07-25 RX ORDER — OXYCODONE AND ACETAMINOPHEN 5; 325 MG/1; MG/1
1 TABLET ORAL DAILY PRN
Qty: 30 TABLET | Refills: 0 | Status: SHIPPED | OUTPATIENT
Start: 2025-07-25 | End: 2025-08-24

## 2025-07-25 NOTE — PROGRESS NOTES
Jb Easley presents to the [facility] on 7/25/2025. Jb is complaining of pain in his low back. The pain is constant. The pain is described as aching, dull, and sharp. Pain is rated on his best day at a 6, on his worst day at a 10, and on average at a 7 on the VAS scale. He took his last dose of Percocet today.     Any procedures since your last visit: No    Pacemaker or defibrillator: No     He is not on NSAIDS and is not on anticoagulation medications.    Do you want someone present when the provider examines you?   No    Medication Contract and Consent for Opioid Use Documents Filed       Patient Documents       Type of Document Status Date Received Received By Description    Medication Contract Received 1/24/2023  4:21 PM [facility]    Medication Contract Received 2/23/2024  3:32 PM TASHI PADILLA contract    Consent Opioid Use Received 4/9/2025  2:26 PM [facility] AGREEMENT                    /67   Resp 16   Ht 1.651 m (5' 5\")   Wt 83 kg (183 lb)   SpO2 95%   BMI 30.45 kg/m²      No LMP for male patient.    
b/l      Extremities:    Tremors:None bilaterally upper and lower  Range of motion:Generally limited extension shoulder - right, pain with internal rotation of hips negative b/l.    Intact:Yes  Edema:Normal      Neurological:    Gait:antalgic    Dermatology:    Skin:no unusual rashes, no skin lesions, no palpable subcutaneous nodules, and good skin turgor    Impression:    Cervical pain and left upper extremity radiculopathy in C5 and C6 distribution  3/2022 cervical MRI shows herniated disc at C5-C6 there is moderate stenosis of the spinal canal with cord impingement with severe left neural foraminal stenosis  PMHx: environ allergies, COPD/asthma, HTN, CAD  LBP L>R posterior LE pain  2022 lumbar MRI shows severe L4-5 stenosis  2022 lumbar f/e films shows instability at L4-5  2022 EDX BLE shows normal but does show changes consistent with severe L4-5 stenosis  Moved [place] from [place] 12/2020 due to health concerns and [place] does not had an adequate health care system  Has not worked since coming to the  due to health issues  Has lumbar symptoms as well - had eval with Dr. Marmolejo      From prior Dr. Rocha visit: He describes some stool>incontinence when he gets severe \"electrical pain\", has happened approx 3 times and it last occurred 2 weeks ago, he states \"I'm not pooing on myself like a baby, only a little bit comes out\"  He reports that he is being worked up for this.        He saw ortho.  Left shoulder CSI which was very helpful.  Possible shoulder replacement if needed.  Shoulders per ortho.  Discussed injections should just go through one office.                Plan:         2/8/23 L knee steroid injection with >80% relief, repeat with excellent relief.  OARRS report reviewed  UDS ordered today  Failed pregabalin 150 mg BD due to feeling angry  Failed norco and voltaren gel due to ineffectiveness.    RF percocet 5/325 QD prn #30 - lasts 5-6 weeks - 2 months.    Completed 4 sessions of PT,

## 2025-07-25 NOTE — H&P (VIEW-ONLY)
[facility]  [address] [place] [id number]    Follow up Note      Jb Easley     Date of Visit:  2025    CC:  Patient presents for follow up   Chief Complaint   Patient presents with    Follow-up     Low back pain          HPI:    Pain is worse to his lower back and left leg.   Shoulders are better after getting CSIs at ortho.    Appropriate analgesia with current medications regimen: yes - somewhat.  Change in quality of symptoms:no.    Medication side effects:none.   Recent diagnostic testing:none.      He has been on anticoagulation medications to include ASA and NSAIDS and has not been on herbal supplements.  He is not diabetic.     Imagin/2022 xray rt shoulder -  FINDINGS:   Three views of the right shoulder were obtained.  There is no acute fracture   dislocation right shoulder.  There are degenerative changes of the AC joint.   The glenohumeral joint is intact and unremarkable.  There is no fracture of   the right scapula.           Impression   1. There is no fracture or dislocation of the right shoulder   2. Significant degenerative changes of the AC joint.      2022 xray lumbar f/e -      FINDINGS:   Anterolisthesis of L4 on L5 is 7 mm in flexion, 4 mm in the neutral position   and 1 mm in extension.  There is mild multilevel degenerative disc disease.   There is moderate degenerative facet arthropathy from L4-S1.           Impression   Findings of spinal instability at L4-5 as noted.  Degenerative lumbar   spondylosis.         2022 EDX -  Diagnostic Interpretation:   Normal emg of lower extremities; however, lumbar paraphasic potentials correlate with mri finding of severe stenosis such that patient is likely symptomatic from lumbar stenosis.  Clinical correlation advised.        Technologist: Norma Almanzar  Physician:Kelton Smith MD     Nerve conduction studies and electromyography were performed according to our laboratory policies and procedures which can be

## 2025-07-28 PROBLEM — M54.16 LUMBAR RADICULAR PAIN: Status: ACTIVE | Noted: 2025-07-25

## 2025-07-28 LAB
6-MAM, QUANTITATIVE, URINE: <10 NG/ML
6-MONOACETYLMORPHINE, URINE: NEGATIVE
7-AMINOCLONAZEPAM, URINE QUANT: <50 NG/ML
ABNORMAL SPECIMEN VALIDITY TEST: ABNORMAL
ALCOHOL URINE: NOT DETECTED MG/DL
ALPHA-HYDROXYALPRAZOLAM, QUANTITATIVE, URINE: <50 NG/ML
ALPHA-HYDROXYMIDAZOLAM, QUANTITATIVE, URINE: <50 NG/ML
ALPHA-HYDROXYTRIAZOLAM, QUANTITATIVE, URINE: <50 NG/ML
ALPRAZOLAM URINE QUANT: <50 NG/ML
AMPHETAMINE SCREEN URINE: NEGATIVE
BARBITURATE SCREEN URINE: NEGATIVE
BENZODIAZEPINE SCREEN, URINE: NEGATIVE
BUPRENORPHINE URINE: NEGATIVE
CANNABINOID SCREEN URINE: NEGATIVE
CHLORDIAZEPOXIDE, URINE QUANT: <50 NG/ML
CLONAZEPAM, URINE QUANT: <50 NG/ML
COCAINE METABOLITE, URINE: NEGATIVE
CODEINE, QUANTITATIVE, URINE: <50 NG/ML
COMPLIANCE DRUG ANALYSIS, URINE: NORMAL
DIAZEPAM URINE QUANT: <50 NG/ML
FENTANYL URINE: NEGATIVE
FLUNITRAZEPAM, QUANTITATIVE, URINE: <50 NG/ML
FLURAZEPAM, QUANTITATIVE, URINE: <50 NG/ML
HYDROCODONE, QUANTITATIVE, URINE: <50 NG/ML
HYDROMORPHONE, QUANTITATIVE, URINE: <50 NG/ML
INTEGRITY CHECK, CREATININE, URINE: 129 MG/DL (ref 22–250)
INTEGRITY CHECK, OXIDANT, URINE: 23 MG/L
INTEGRITY CHECK, PH, URINE: 7.2 (ref 4.5–8)
INTEGRITY CHECK, SPECIFIC GRAVITY, URINE: 1.02 (ref 1–1.03)
LORAZEPAM URINE QUANT: <50 NG/ML
METHADONE SCREEN, URINE: NEGATIVE
MIDAZOLAM URINE QUANT: <50 NG/ML
MORPHINE, QUANTITATIVE, URINE: <50 NG/ML
NORDIAZEPAM URINE QUANT: <50 NG/ML
NORHYDROCODONE, QUANTITATIVE, URINE: <50 NG/ML
NOROXYCODONE, QUANTITATIVE, URINE: 540.9 NG/ML
OPIATES, URINE: NEGATIVE
OXAZEPAM URINE QUANT: <50 NG/ML
OXYCODONE SCREEN URINE: POSITIVE
OXYCODONE URINE, QUANTITATIVE: 180.1 NG/ML
OXYMORPHONE, QUANTITATIVE, URINE: 732.3 NG/ML
PCP,URINE: NEGATIVE
TEMAZEPAM, QUANTITATIVE, URINE: <50 NG/ML
TEST INFORMATION: ABNORMAL
TRAMADOL, URINE: NEGATIVE

## 2025-07-29 ENCOUNTER — HOSPITAL ENCOUNTER (OUTPATIENT)
Dept: OPERATING ROOM | Age: 60
Setting detail: OUTPATIENT SURGERY
Discharge: HOME | End: 2025-07-29
Attending: ANESTHESIOLOGY
Payer: MEDICARE

## 2025-07-29 ENCOUNTER — HOSPITAL ENCOUNTER (OUTPATIENT)
Age: 60
Setting detail: OUTPATIENT SURGERY
Discharge: HOME | End: 2025-07-29
Attending: ANESTHESIOLOGY | Admitting: ANESTHESIOLOGY
Payer: MEDICARE

## 2025-07-29 VITALS
HEART RATE: 72 BPM | OXYGEN SATURATION: 96 % | RESPIRATION RATE: 16 BRPM | TEMPERATURE: 97.1 F | WEIGHT: 189 LBS | HEIGHT: 65 IN | SYSTOLIC BLOOD PRESSURE: 110 MMHG | BODY MASS INDEX: 31.49 KG/M2 | DIASTOLIC BLOOD PRESSURE: 67 MMHG

## 2025-07-29 DIAGNOSIS — M51.9 LUMBAR DISC DISEASE: ICD-10-CM

## 2025-07-29 PROCEDURE — 3600000015 HC SURGERY LEVEL 5 ADDTL 15MIN: Performed by: ANESTHESIOLOGY

## 2025-07-29 PROCEDURE — 7100000011 HC PHASE II RECOVERY - ADDTL 15 MIN: Performed by: ANESTHESIOLOGY

## 2025-07-29 PROCEDURE — 2709999900 HC NON-CHARGEABLE SUPPLY: Performed by: ANESTHESIOLOGY

## 2025-07-29 PROCEDURE — 6360000002 HC RX W HCPCS: Performed by: ANESTHESIOLOGY

## 2025-07-29 PROCEDURE — 64483 NJX AA&/STRD TFRM EPI L/S 1: CPT | Performed by: ANESTHESIOLOGY

## 2025-07-29 PROCEDURE — 7100000010 HC PHASE II RECOVERY - FIRST 15 MIN: Performed by: ANESTHESIOLOGY

## 2025-07-29 PROCEDURE — 6360000004 HC RX CONTRAST MEDICATION: Performed by: ANESTHESIOLOGY

## 2025-07-29 PROCEDURE — 3600000005 HC SURGERY LEVEL 5 BASE: Performed by: ANESTHESIOLOGY

## 2025-07-29 RX ORDER — DEXAMETHASONE SODIUM PHOSPHATE 10 MG/ML
INJECTION, SOLUTION INTRAMUSCULAR; INTRAVENOUS PRN
Status: DISCONTINUED | OUTPATIENT
Start: 2025-07-29 | End: 2025-07-29 | Stop reason: ALTCHOICE

## 2025-07-29 RX ORDER — LIDOCAINE HYDROCHLORIDE 5 MG/ML
INJECTION, SOLUTION INFILTRATION; INTRAVENOUS PRN
Status: DISCONTINUED | OUTPATIENT
Start: 2025-07-29 | End: 2025-07-29 | Stop reason: ALTCHOICE

## 2025-07-29 RX ORDER — METHYLPREDNISOLONE ACETATE 40 MG/ML
INJECTION, SUSPENSION INTRA-ARTICULAR; INTRALESIONAL; INTRAMUSCULAR; SOFT TISSUE PRN
Status: DISCONTINUED | OUTPATIENT
Start: 2025-07-29 | End: 2025-07-29 | Stop reason: ALTCHOICE

## 2025-07-29 ASSESSMENT — PAIN - FUNCTIONAL ASSESSMENT
PAIN_FUNCTIONAL_ASSESSMENT: 0-10
PAIN_FUNCTIONAL_ASSESSMENT: 0-10
PAIN_FUNCTIONAL_ASSESSMENT: NONE - DENIES PAIN

## 2025-07-29 ASSESSMENT — PAIN DESCRIPTION - DESCRIPTORS: DESCRIPTORS: ACHING

## 2025-07-29 NOTE — PROGRESS NOTES
Attempted bilateral transforaminal epidural at L5  performed bilateral transforaminal epidural at S1

## 2025-07-29 NOTE — OP NOTE
Operative Note      Patient: Jb Easley  YOB: 1965  MRN: [id number]    Date of Procedure: 2025    Pre-Op Diagnosis Codes:      * Lumbar radicular pain [M54.16]    Post-Op Diagnosis: Same       Procedure(s):  Bilateral Transforaminal epidural steroid injection under fluoroscopic guidance at foraminal level sacral 1    Surgeon(s):  Derek Car MD    Assistant:   * No surgical staff found *    Anesthesia: Local    Estimated Blood Loss (mL): Minimal    Complications: None    Specimens:   * No specimens in log *    Implants:  * No implants in log *      Drains: * No LDAs found *    Findings:  Present At Time Of Surgery (PATOS) (choose all levels that have infection present):  No infection present  Other Findings: good needle placement    Detailed Description of Procedure:   2025    Patient: Jb Easley  :  1965  Age:  59 y.o.  Sex:  male     PRE-OPERATIVE DIAGNOSIS: Lumbar disc displacement, lumbar neural foraminal stenosis, lumbar radiculopathy.     POST-OPERATIVE DIAGNOSIS: Same.    PROCEDURE: Bilateral Transforaminal epidural steroid injection under fluoroscopic guidance at foraminal level S1.    SURGEON: Derek Car MD    ANESTHESIA: Local    ESTIMATED BLOOD LOSS: None.  ______________________________________________________________________  BRIEF HISTORY: Jb Easley comes in today for the  Bilateral transforaminal epidural steroid injection under fluoroscopic guidance at foraminal level S1 . The potential complications of this procedure were discussed with him again today.  He has elected to undergo the aforementioned procedure.     Jb’s complete History & Physical examination were reviewed in depth, a copy of which is in the chart.      DESCRIPTION OF PROCEDURE:    After confirming written and informed consent, a time-out was performed and Jb’s name and date of birth, the procedure to be performed as well as the plan for the location of

## 2025-07-29 NOTE — DISCHARGE INSTRUCTIONS
[facility]  [phone]   Post-Pain Block/ Radiofrequency Home Going Instructions    1-Go home, rest for the remainder of the day  2-Please do not lift over 20 pounds the day of the injection  3-If you received sedation No: alcohol, driving, operating lawn mowers, plows, tractors or other dangerous equipment until next morning. Do not make important decisions or sign legal documents for 24 hours. You may experience light headedness, dizziness, nausea or sleepiness after sedation. Do not stay alone. A responsible adult must be with you for 24 hours. You could be nauseated from the medications you have received. Your IV site may be sore and bruised.    4-No dietary restrictions     5-Resume all medications the same day, blood thinners to be resumed 24 hours after injection    6-Keep the surgical site clean and dry, you may shower the next morning and remove the      dressing.     7- No sitz baths, tub baths or hot tubs/swimming for 24 hours.       8- If you have any pain at the injection site(s), application of an ice pack to the area should be       helpful, 20 minutes on/20 minutes off for next 48 hours.  9- Call [facility] pain management immediately at if you develop.  Fever greater than 100.4 F  Have bleeding or drainage from the puncture site  Have progressive Leg/arm numbness and or weakness  Loss of control of bowel and or bladder (wet/soil yourself)  Severe headache with inability to lift head  10-You may return to work the next day

## 2025-07-29 NOTE — INTERVAL H&P NOTE
Update History & Physical    The patient's History and Physical of July 25, 2025 was reviewed with the patient and I examined the patient. There was no change. The surgical site was confirmed by the patient and me.     Plan: The risks, benefits, expected outcome, and alternative to the recommended procedure have been discussed with the patient. Patient understands and wants to proceed with the procedure.     Electronically signed by Derek Car MD on 7/29/2025

## 2025-08-22 ENCOUNTER — HOSPITAL ENCOUNTER (EMERGENCY)
Age: 60
Discharge: HOME | End: 2025-08-22
Payer: MEDICARE

## 2025-08-22 ENCOUNTER — APPOINTMENT (OUTPATIENT)
Dept: CT IMAGING | Age: 60
End: 2025-08-22
Payer: MEDICARE

## 2025-08-22 VITALS
OXYGEN SATURATION: 98 % | TEMPERATURE: 98 F | WEIGHT: 197 LBS | HEART RATE: 84 BPM | SYSTOLIC BLOOD PRESSURE: 159 MMHG | HEIGHT: 65 IN | DIASTOLIC BLOOD PRESSURE: 85 MMHG | BODY MASS INDEX: 32.82 KG/M2 | RESPIRATION RATE: 18 BRPM

## 2025-08-22 DIAGNOSIS — T14.8XXA CONTUSION OF SOFT TISSUE: Primary | ICD-10-CM

## 2025-08-22 LAB
ALBUMIN SERPL-MCNC: 4.2 G/DL (ref 3.5–5.2)
ALP SERPL-CCNC: 48 U/L (ref 40–129)
ALT SERPL-CCNC: 73 U/L (ref 0–50)
ANION GAP SERPL CALCULATED.3IONS-SCNC: 13 MMOL/L (ref 7–16)
AST SERPL-CCNC: 52 U/L (ref 0–50)
BASOPHILS # BLD: 0.05 K/UL (ref 0–0.2)
BASOPHILS NFR BLD: 1 % (ref 0–2)
BILIRUB SERPL-MCNC: 0.5 MG/DL (ref 0–1.2)
BUN SERPL-MCNC: 19 MG/DL (ref 6–20)
CALCIUM SERPL-MCNC: 9.4 MG/DL (ref 8.6–10)
CHLORIDE SERPL-SCNC: 102 MMOL/L (ref 98–107)
CO2 SERPL-SCNC: 24 MMOL/L (ref 22–29)
CREAT SERPL-MCNC: 1 MG/DL (ref 0.7–1.2)
EOSINOPHIL # BLD: 0.53 K/UL (ref 0.05–0.5)
EOSINOPHILS RELATIVE PERCENT: 6 % (ref 0–6)
ERYTHROCYTE [DISTWIDTH] IN BLOOD BY AUTOMATED COUNT: 12.2 % (ref 11.5–15)
GFR, ESTIMATED: >90 ML/MIN/1.73M2
GLUCOSE SERPL-MCNC: 118 MG/DL (ref 74–99)
HCT VFR BLD AUTO: 42 % (ref 37–54)
HGB BLD-MCNC: 14.5 G/DL (ref 12.5–16.5)
IMM GRANULOCYTES # BLD AUTO: <0.03 K/UL (ref 0–0.58)
IMM GRANULOCYTES NFR BLD: 0 % (ref 0–5)
LYMPHOCYTES NFR BLD: 3.11 K/UL (ref 1.5–4)
LYMPHOCYTES RELATIVE PERCENT: 36 % (ref 20–42)
MCH RBC QN AUTO: 32.3 PG (ref 26–35)
MCHC RBC AUTO-ENTMCNC: 34.5 G/DL (ref 32–34.5)
MCV RBC AUTO: 93.5 FL (ref 80–99.9)
MONOCYTES NFR BLD: 0.83 K/UL (ref 0.1–0.95)
MONOCYTES NFR BLD: 10 % (ref 2–12)
NEUTROPHILS NFR BLD: 48 % (ref 43–80)
NEUTS SEG NFR BLD: 4.11 K/UL (ref 1.8–7.3)
PLATELET # BLD AUTO: 268 K/UL (ref 130–450)
PMV BLD AUTO: 9.9 FL (ref 7–12)
POTASSIUM SERPL-SCNC: 3.6 MMOL/L (ref 3.5–5.1)
PROT SERPL-MCNC: 7.1 G/DL (ref 6.4–8.3)
RBC # BLD AUTO: 4.49 M/UL (ref 3.8–5.8)
SODIUM SERPL-SCNC: 139 MMOL/L (ref 136–145)
WBC OTHER # BLD: 8.7 K/UL (ref 4.5–11.5)

## 2025-08-22 PROCEDURE — 70491 CT SOFT TISSUE NECK W/DYE: CPT

## 2025-08-22 PROCEDURE — 6360000002 HC RX W HCPCS

## 2025-08-22 PROCEDURE — 80053 COMPREHEN METABOLIC PANEL: CPT

## 2025-08-22 PROCEDURE — 85025 COMPLETE CBC W/AUTO DIFF WBC: CPT

## 2025-08-22 PROCEDURE — 99285 EMERGENCY DEPT VISIT HI MDM: CPT

## 2025-08-22 PROCEDURE — 6360000004 HC RX CONTRAST MEDICATION: Performed by: RADIOLOGY

## 2025-08-22 RX ORDER — PREDNISONE 20 MG/1
20 TABLET ORAL 2 TIMES DAILY
Qty: 10 TABLET | Refills: 0 | Status: SHIPPED | OUTPATIENT
Start: 2025-08-22 | End: 2025-08-27

## 2025-08-22 RX ORDER — IOPAMIDOL 755 MG/ML
75 INJECTION, SOLUTION INTRAVASCULAR
Status: COMPLETED | OUTPATIENT
Start: 2025-08-22 | End: 2025-08-22

## 2025-08-22 RX ORDER — DEXAMETHASONE SODIUM PHOSPHATE 10 MG/ML
10 INJECTION, SOLUTION INTRA-ARTICULAR; INTRALESIONAL; INTRAMUSCULAR; INTRAVENOUS; SOFT TISSUE ONCE
Status: COMPLETED | OUTPATIENT
Start: 2025-08-22 | End: 2025-08-22

## 2025-08-22 RX ADMIN — DEXAMETHASONE SODIUM PHOSPHATE 10 MG: 10 INJECTION INTRAMUSCULAR; INTRAVENOUS at 18:47

## 2025-08-22 RX ADMIN — IOPAMIDOL 75 ML: 755 INJECTION, SOLUTION INTRAVENOUS at 19:45

## (undated) DEVICE — 12 ML SYRINGE,LUER-LOCK TIP: Brand: MONOJECT

## (undated) DEVICE — 3 ML SYRINGE LUER-LOCK TIP: Brand: MONOJECT

## (undated) DEVICE — BANDAGE,ADHESIVE,FABRIC,1"X3",STRL,LF: Brand: CURAD

## (undated) DEVICE — NEEDLE HYPO 25GA L1.5IN BLU POLYPR HUB S STL REG BVL STR

## (undated) DEVICE — BANDAGE ADH W1XL3IN NAT FAB WVN FLX DURABLE N ADH PD SEAL

## (undated) DEVICE — NEEDLE, QUINCKE, 22GX5": Brand: MEDLINE

## (undated) DEVICE — 3M™ RED DOT™ MONITORING ELECTRODE WITH FOAM TAPE AND STICKY GEL 2560, 50/BAG, 20/CASE, 72/PLT: Brand: RED DOT™

## (undated) DEVICE — SYRINGE MED 5ML STD CLR PLAS LUERLOCK TIP N CTRL DISP

## (undated) DEVICE — APPLICATOR MEDICATED 10.5 CC SOLUTION HI LT ORNG CHLORAPREP

## (undated) DEVICE — GAUZE,SPONGE,4"X4",8PLY,STRL,LF,10/TRAY: Brand: MEDLINE

## (undated) DEVICE — GLOVE ORANGE PI 7 1/2   MSG9075

## (undated) DEVICE — MARKER,SKIN,WI/RULER AND LABELS: Brand: MEDLINE

## (undated) DEVICE — NEEDLE HYPO 18GA L1.5IN PNK POLYPR HUB S STL REG BVL STR

## (undated) DEVICE — NEEDLE SPNL 22GA L3.5IN BLK HUB S STL REG WALL FIT STYL

## (undated) DEVICE — NON-DEHP CATHETER EXTENSION SET, MALE LUER LOCK ADAPTER

## (undated) DEVICE — 6 ML SYRINGE LUER-LOCK TIP: Brand: MONOJECT

## (undated) DEVICE — COUNTER NDL 10 COUNT HLD 20 FOAM BLK SGL MAG

## (undated) DEVICE — Device: Brand: PORTEX

## (undated) DEVICE — GAUZE,SPONGE,4"X4",12PLY,STERILE,LF,2'S: Brand: MEDLINE

## (undated) DEVICE — TOWEL,OR,DSP,ST,BLUE,STD,6/PK,12PK/CS: Brand: MEDLINE